# Patient Record
Sex: FEMALE | Race: WHITE | NOT HISPANIC OR LATINO | Employment: FULL TIME | URBAN - METROPOLITAN AREA
[De-identification: names, ages, dates, MRNs, and addresses within clinical notes are randomized per-mention and may not be internally consistent; named-entity substitution may affect disease eponyms.]

---

## 2023-11-25 ENCOUNTER — APPOINTMENT (OUTPATIENT)
Dept: RADIOLOGY | Facility: HOSPITAL | Age: 48
DRG: 281 | End: 2023-11-25
Payer: COMMERCIAL

## 2023-11-25 ENCOUNTER — HOSPITAL ENCOUNTER (INPATIENT)
Facility: HOSPITAL | Age: 48
LOS: 2 days | Discharge: HOME | DRG: 281 | End: 2023-11-27
Attending: STUDENT IN AN ORGANIZED HEALTH CARE EDUCATION/TRAINING PROGRAM | Admitting: INTERNAL MEDICINE
Payer: COMMERCIAL

## 2023-11-25 DIAGNOSIS — R07.9 CHEST PAIN, UNSPECIFIED TYPE: ICD-10-CM

## 2023-11-25 DIAGNOSIS — I21.4 NSTEMI (NON-ST ELEVATED MYOCARDIAL INFARCTION) (MULTI): Primary | ICD-10-CM

## 2023-11-25 DIAGNOSIS — I25.42 SPONTANEOUS DISSECTION OF CORONARY ARTERY: ICD-10-CM

## 2023-11-25 DIAGNOSIS — I21.3 STEMI (ST ELEVATION MYOCARDIAL INFARCTION) (MULTI): ICD-10-CM

## 2023-11-25 LAB
ALBUMIN SERPL BCP-MCNC: 4.4 G/DL (ref 3.4–5)
ALP SERPL-CCNC: 61 U/L (ref 33–110)
ALT SERPL W P-5'-P-CCNC: 12 U/L (ref 7–45)
ANION GAP SERPL CALC-SCNC: 11 MMOL/L (ref 10–20)
APTT PPP: 102 SECONDS (ref 27–38)
AST SERPL W P-5'-P-CCNC: 13 U/L (ref 9–39)
BASOPHILS # BLD AUTO: 0.03 X10*3/UL (ref 0–0.1)
BASOPHILS NFR BLD AUTO: 0.3 %
BILIRUB SERPL-MCNC: 0.4 MG/DL (ref 0–1.2)
BNP SERPL-MCNC: 22 PG/ML (ref 0–99)
BUN SERPL-MCNC: 9 MG/DL (ref 6–23)
CALCIUM SERPL-MCNC: 9 MG/DL (ref 8.6–10.3)
CARDIAC TROPONIN I PNL SERPL HS: 121 NG/L (ref 0–13)
CARDIAC TROPONIN I PNL SERPL HS: 352 NG/L (ref 0–13)
CARDIAC TROPONIN I PNL SERPL HS: 717 NG/L (ref 0–13)
CHLORIDE SERPL-SCNC: 105 MMOL/L (ref 98–107)
CO2 SERPL-SCNC: 25 MMOL/L (ref 21–32)
CREAT SERPL-MCNC: 0.84 MG/DL (ref 0.5–1.05)
EOSINOPHIL # BLD AUTO: 0.16 X10*3/UL (ref 0–0.7)
EOSINOPHIL NFR BLD AUTO: 1.7 %
ERYTHROCYTE [DISTWIDTH] IN BLOOD BY AUTOMATED COUNT: 12.5 % (ref 11.5–14.5)
GFR SERPL CREATININE-BSD FRML MDRD: 86 ML/MIN/1.73M*2
GLUCOSE SERPL-MCNC: 86 MG/DL (ref 74–99)
HCT VFR BLD AUTO: 38.5 % (ref 36–46)
HGB BLD-MCNC: 13.4 G/DL (ref 12–16)
IMM GRANULOCYTES # BLD AUTO: 0.05 X10*3/UL (ref 0–0.7)
IMM GRANULOCYTES NFR BLD AUTO: 0.5 % (ref 0–0.9)
INR PPP: 1.2 (ref 0.9–1.1)
LYMPHOCYTES # BLD AUTO: 1.43 X10*3/UL (ref 1.2–4.8)
LYMPHOCYTES NFR BLD AUTO: 15.6 %
MAGNESIUM SERPL-MCNC: 1.83 MG/DL (ref 1.6–2.4)
MCH RBC QN AUTO: 31.2 PG (ref 26–34)
MCHC RBC AUTO-ENTMCNC: 34.8 G/DL (ref 32–36)
MCV RBC AUTO: 90 FL (ref 80–100)
MONOCYTES # BLD AUTO: 0.58 X10*3/UL (ref 0.1–1)
MONOCYTES NFR BLD AUTO: 6.3 %
NEUTROPHILS # BLD AUTO: 6.9 X10*3/UL (ref 1.2–7.7)
NEUTROPHILS NFR BLD AUTO: 75.6 %
NRBC BLD-RTO: 0 /100 WBCS (ref 0–0)
PLATELET # BLD AUTO: 265 X10*3/UL (ref 150–450)
POTASSIUM SERPL-SCNC: 4 MMOL/L (ref 3.5–5.3)
PROT SERPL-MCNC: 7.2 G/DL (ref 6.4–8.2)
PROTHROMBIN TIME: 13.8 SECONDS (ref 9.8–12.8)
RBC # BLD AUTO: 4.3 X10*6/UL (ref 4–5.2)
SODIUM SERPL-SCNC: 137 MMOL/L (ref 136–145)
WBC # BLD AUTO: 9.2 X10*3/UL (ref 4.4–11.3)

## 2023-11-25 PROCEDURE — 99291 CRITICAL CARE FIRST HOUR: CPT | Mod: 25 | Performed by: STUDENT IN AN ORGANIZED HEALTH CARE EDUCATION/TRAINING PROGRAM

## 2023-11-25 PROCEDURE — 2550000001 HC RX 255 CONTRASTS: Performed by: STUDENT IN AN ORGANIZED HEALTH CARE EDUCATION/TRAINING PROGRAM

## 2023-11-25 PROCEDURE — 2720000007 HC OR 272 NO HCPCS: Performed by: STUDENT IN AN ORGANIZED HEALTH CARE EDUCATION/TRAINING PROGRAM

## 2023-11-25 PROCEDURE — B2111ZZ FLUOROSCOPY OF MULTIPLE CORONARY ARTERIES USING LOW OSMOLAR CONTRAST: ICD-10-PCS | Performed by: STUDENT IN AN ORGANIZED HEALTH CARE EDUCATION/TRAINING PROGRAM

## 2023-11-25 PROCEDURE — 99152 MOD SED SAME PHYS/QHP 5/>YRS: CPT | Performed by: STUDENT IN AN ORGANIZED HEALTH CARE EDUCATION/TRAINING PROGRAM

## 2023-11-25 PROCEDURE — 80053 COMPREHEN METABOLIC PANEL: CPT | Performed by: STUDENT IN AN ORGANIZED HEALTH CARE EDUCATION/TRAINING PROGRAM

## 2023-11-25 PROCEDURE — 84484 ASSAY OF TROPONIN QUANT: CPT | Performed by: STUDENT IN AN ORGANIZED HEALTH CARE EDUCATION/TRAINING PROGRAM

## 2023-11-25 PROCEDURE — 96361 HYDRATE IV INFUSION ADD-ON: CPT | Mod: 59

## 2023-11-25 PROCEDURE — 93458 L HRT ARTERY/VENTRICLE ANGIO: CPT | Performed by: STUDENT IN AN ORGANIZED HEALTH CARE EDUCATION/TRAINING PROGRAM

## 2023-11-25 PROCEDURE — 99153 MOD SED SAME PHYS/QHP EA: CPT | Performed by: STUDENT IN AN ORGANIZED HEALTH CARE EDUCATION/TRAINING PROGRAM

## 2023-11-25 PROCEDURE — 2020000001 HC ICU ROOM DAILY

## 2023-11-25 PROCEDURE — 2500000005 HC RX 250 GENERAL PHARMACY W/O HCPCS: Performed by: STUDENT IN AN ORGANIZED HEALTH CARE EDUCATION/TRAINING PROGRAM

## 2023-11-25 PROCEDURE — 36415 COLL VENOUS BLD VENIPUNCTURE: CPT | Performed by: STUDENT IN AN ORGANIZED HEALTH CARE EDUCATION/TRAINING PROGRAM

## 2023-11-25 PROCEDURE — 2500000001 HC RX 250 WO HCPCS SELF ADMINISTERED DRUGS (ALT 637 FOR MEDICARE OP): Performed by: STUDENT IN AN ORGANIZED HEALTH CARE EDUCATION/TRAINING PROGRAM

## 2023-11-25 PROCEDURE — 2500000004 HC RX 250 GENERAL PHARMACY W/ HCPCS (ALT 636 FOR OP/ED): Performed by: STUDENT IN AN ORGANIZED HEALTH CARE EDUCATION/TRAINING PROGRAM

## 2023-11-25 PROCEDURE — B2151ZZ FLUOROSCOPY OF LEFT HEART USING LOW OSMOLAR CONTRAST: ICD-10-PCS | Performed by: STUDENT IN AN ORGANIZED HEALTH CARE EDUCATION/TRAINING PROGRAM

## 2023-11-25 PROCEDURE — 71275 CT ANGIOGRAPHY CHEST: CPT | Performed by: RADIOLOGY

## 2023-11-25 PROCEDURE — 83880 ASSAY OF NATRIURETIC PEPTIDE: CPT | Performed by: STUDENT IN AN ORGANIZED HEALTH CARE EDUCATION/TRAINING PROGRAM

## 2023-11-25 PROCEDURE — 99222 1ST HOSP IP/OBS MODERATE 55: CPT | Performed by: INTERNAL MEDICINE

## 2023-11-25 PROCEDURE — 74174 CTA ABD&PLVS W/CONTRAST: CPT | Performed by: RADIOLOGY

## 2023-11-25 PROCEDURE — 85347 COAGULATION TIME ACTIVATED: CPT

## 2023-11-25 PROCEDURE — 4A023N7 MEASUREMENT OF CARDIAC SAMPLING AND PRESSURE, LEFT HEART, PERCUTANEOUS APPROACH: ICD-10-PCS | Performed by: STUDENT IN AN ORGANIZED HEALTH CARE EDUCATION/TRAINING PROGRAM

## 2023-11-25 PROCEDURE — 85025 COMPLETE CBC W/AUTO DIFF WBC: CPT | Performed by: STUDENT IN AN ORGANIZED HEALTH CARE EDUCATION/TRAINING PROGRAM

## 2023-11-25 PROCEDURE — 71045 X-RAY EXAM CHEST 1 VIEW: CPT | Mod: FY

## 2023-11-25 PROCEDURE — C1887 CATHETER, GUIDING: HCPCS | Performed by: STUDENT IN AN ORGANIZED HEALTH CARE EDUCATION/TRAINING PROGRAM

## 2023-11-25 PROCEDURE — 71275 CT ANGIOGRAPHY CHEST: CPT

## 2023-11-25 PROCEDURE — 71045 X-RAY EXAM CHEST 1 VIEW: CPT | Mod: FOREIGN READ | Performed by: RADIOLOGY

## 2023-11-25 PROCEDURE — 7100000009 HC PHASE TWO TIME - INITIAL BASE CHARGE: Performed by: STUDENT IN AN ORGANIZED HEALTH CARE EDUCATION/TRAINING PROGRAM

## 2023-11-25 PROCEDURE — 96375 TX/PRO/DX INJ NEW DRUG ADDON: CPT | Mod: 59

## 2023-11-25 PROCEDURE — 7100000010 HC PHASE TWO TIME - EACH INCREMENTAL 1 MINUTE: Performed by: STUDENT IN AN ORGANIZED HEALTH CARE EDUCATION/TRAINING PROGRAM

## 2023-11-25 PROCEDURE — 83735 ASSAY OF MAGNESIUM: CPT | Performed by: STUDENT IN AN ORGANIZED HEALTH CARE EDUCATION/TRAINING PROGRAM

## 2023-11-25 PROCEDURE — 85610 PROTHROMBIN TIME: CPT | Performed by: INTERNAL MEDICINE

## 2023-11-25 PROCEDURE — C1894 INTRO/SHEATH, NON-LASER: HCPCS | Performed by: STUDENT IN AN ORGANIZED HEALTH CARE EDUCATION/TRAINING PROGRAM

## 2023-11-25 PROCEDURE — 96374 THER/PROPH/DIAG INJ IV PUSH: CPT | Mod: 59

## 2023-11-25 PROCEDURE — 99222 1ST HOSP IP/OBS MODERATE 55: CPT | Performed by: STUDENT IN AN ORGANIZED HEALTH CARE EDUCATION/TRAINING PROGRAM

## 2023-11-25 RX ORDER — ATORVASTATIN CALCIUM 40 MG/1
40 TABLET, FILM COATED ORAL NIGHTLY
Status: DISCONTINUED | OUTPATIENT
Start: 2023-11-25 | End: 2023-11-27 | Stop reason: HOSPADM

## 2023-11-25 RX ORDER — ACETAMINOPHEN 160 MG/5ML
650 SOLUTION ORAL EVERY 4 HOURS PRN
Status: DISCONTINUED | OUTPATIENT
Start: 2023-11-25 | End: 2023-11-27 | Stop reason: HOSPADM

## 2023-11-25 RX ORDER — HEPARIN SODIUM 1000 [USP'U]/ML
INJECTION, SOLUTION INTRAVENOUS; SUBCUTANEOUS AS NEEDED
Status: DISCONTINUED | OUTPATIENT
Start: 2023-11-25 | End: 2023-11-25 | Stop reason: HOSPADM

## 2023-11-25 RX ORDER — HEPARIN SODIUM 5000 [USP'U]/ML
2000-4000 INJECTION, SOLUTION INTRAVENOUS; SUBCUTANEOUS EVERY 4 HOURS PRN
Status: DISCONTINUED | OUTPATIENT
Start: 2023-11-25 | End: 2023-11-25

## 2023-11-25 RX ORDER — POLYETHYLENE GLYCOL 3350 17 G/17G
17 POWDER, FOR SOLUTION ORAL DAILY
Status: DISCONTINUED | OUTPATIENT
Start: 2023-11-25 | End: 2023-11-27 | Stop reason: HOSPADM

## 2023-11-25 RX ORDER — SODIUM CHLORIDE 9 MG/ML
1 INJECTION, SOLUTION INTRAVENOUS CONTINUOUS
Status: ACTIVE | OUTPATIENT
Start: 2023-11-25 | End: 2023-11-26

## 2023-11-25 RX ORDER — MIDAZOLAM HYDROCHLORIDE 1 MG/ML
INJECTION INTRAMUSCULAR; INTRAVENOUS AS NEEDED
Status: DISCONTINUED | OUTPATIENT
Start: 2023-11-25 | End: 2023-11-25 | Stop reason: HOSPADM

## 2023-11-25 RX ORDER — MORPHINE SULFATE 4 MG/ML
4 INJECTION, SOLUTION INTRAMUSCULAR; INTRAVENOUS ONCE
Status: COMPLETED | OUTPATIENT
Start: 2023-11-25 | End: 2023-11-25

## 2023-11-25 RX ORDER — HEPARIN SODIUM 5000 [USP'U]/ML
4000 INJECTION, SOLUTION INTRAVENOUS; SUBCUTANEOUS ONCE
Status: COMPLETED | OUTPATIENT
Start: 2023-11-25 | End: 2023-11-25

## 2023-11-25 RX ORDER — NAPROXEN SODIUM 220 MG/1
324 TABLET, FILM COATED ORAL ONCE
Status: COMPLETED | OUTPATIENT
Start: 2023-11-25 | End: 2023-11-25

## 2023-11-25 RX ORDER — FENTANYL CITRATE 50 UG/ML
INJECTION, SOLUTION INTRAMUSCULAR; INTRAVENOUS AS NEEDED
Status: DISCONTINUED | OUTPATIENT
Start: 2023-11-25 | End: 2023-11-25 | Stop reason: HOSPADM

## 2023-11-25 RX ORDER — LIDOCAINE HYDROCHLORIDE 20 MG/ML
INJECTION, SOLUTION INFILTRATION; PERINEURAL AS NEEDED
Status: DISCONTINUED | OUTPATIENT
Start: 2023-11-25 | End: 2023-11-25 | Stop reason: HOSPADM

## 2023-11-25 RX ORDER — SODIUM CHLORIDE 9 MG/ML
100 INJECTION, SOLUTION INTRAVENOUS CONTINUOUS
Status: DISCONTINUED | OUTPATIENT
Start: 2023-11-25 | End: 2023-11-25

## 2023-11-25 RX ORDER — NAPROXEN SODIUM 220 MG/1
81 TABLET, FILM COATED ORAL DAILY
Status: DISCONTINUED | OUTPATIENT
Start: 2023-11-26 | End: 2023-11-27 | Stop reason: HOSPADM

## 2023-11-25 RX ORDER — ACETAMINOPHEN 325 MG/1
650 TABLET ORAL EVERY 4 HOURS PRN
Status: DISCONTINUED | OUTPATIENT
Start: 2023-11-25 | End: 2023-11-27 | Stop reason: HOSPADM

## 2023-11-25 RX ORDER — ONDANSETRON HYDROCHLORIDE 2 MG/ML
4 INJECTION, SOLUTION INTRAVENOUS ONCE
Status: COMPLETED | OUTPATIENT
Start: 2023-11-25 | End: 2023-11-25

## 2023-11-25 RX ORDER — ACETAMINOPHEN 650 MG/1
650 SUPPOSITORY RECTAL EVERY 4 HOURS PRN
Status: DISCONTINUED | OUTPATIENT
Start: 2023-11-25 | End: 2023-11-27 | Stop reason: HOSPADM

## 2023-11-25 RX ORDER — HEPARIN SODIUM 10000 [USP'U]/100ML
0-4000 INJECTION, SOLUTION INTRAVENOUS CONTINUOUS
Status: DISCONTINUED | OUTPATIENT
Start: 2023-11-25 | End: 2023-11-25

## 2023-11-25 RX ADMIN — HEPARIN SODIUM 4000 UNITS: 5000 INJECTION INTRAVENOUS; SUBCUTANEOUS at 13:56

## 2023-11-25 RX ADMIN — SODIUM CHLORIDE 500 ML: 9 INJECTION, SOLUTION INTRAVENOUS at 14:19

## 2023-11-25 RX ADMIN — TICAGRELOR 90 MG: 90 TABLET ORAL at 20:11

## 2023-11-25 RX ADMIN — TICAGRELOR 180 MG: 90 TABLET ORAL at 13:56

## 2023-11-25 RX ADMIN — SODIUM CHLORIDE, POTASSIUM CHLORIDE, SODIUM LACTATE AND CALCIUM CHLORIDE 1000 ML: 600; 310; 30; 20 INJECTION, SOLUTION INTRAVENOUS at 09:51

## 2023-11-25 RX ADMIN — MORPHINE SULFATE 4 MG: 4 INJECTION, SOLUTION INTRAMUSCULAR; INTRAVENOUS at 09:50

## 2023-11-25 RX ADMIN — ATORVASTATIN CALCIUM 40 MG: 40 TABLET, FILM COATED ORAL at 20:12

## 2023-11-25 RX ADMIN — IOHEXOL 100 ML: 350 INJECTION, SOLUTION INTRAVENOUS at 12:06

## 2023-11-25 RX ADMIN — ONDANSETRON 4 MG: 2 INJECTION INTRAMUSCULAR; INTRAVENOUS at 09:50

## 2023-11-25 RX ADMIN — HEPARIN SODIUM 900 UNITS/HR: 10000 INJECTION, SOLUTION INTRAVENOUS at 13:58

## 2023-11-25 RX ADMIN — ASPIRIN 81 MG CHEWABLE TABLET 324 MG: 81 TABLET CHEWABLE at 11:26

## 2023-11-25 SDOH — SOCIAL STABILITY: SOCIAL INSECURITY: WERE YOU ABLE TO COMPLETE ALL THE BEHAVIORAL HEALTH SCREENINGS?: YES

## 2023-11-25 SDOH — SOCIAL STABILITY: SOCIAL INSECURITY: ABUSE: ADULT

## 2023-11-25 SDOH — SOCIAL STABILITY: SOCIAL INSECURITY: HAVE YOU HAD THOUGHTS OF HARMING ANYONE ELSE?: NO

## 2023-11-25 ASSESSMENT — ACTIVITIES OF DAILY LIVING (ADL)
LACK_OF_TRANSPORTATION: NO
JUDGMENT_ADEQUATE_SAFELY_COMPLETE_DAILY_ACTIVITIES: YES
HEARING - RIGHT EAR: FUNCTIONAL
ADEQUATE_TO_COMPLETE_ADL: YES
BATHING: INDEPENDENT
HEARING - LEFT EAR: FUNCTIONAL
FEEDING YOURSELF: INDEPENDENT
DRESSING YOURSELF: INDEPENDENT
TOILETING: INDEPENDENT
PATIENT'S MEMORY ADEQUATE TO SAFELY COMPLETE DAILY ACTIVITIES?: YES
GROOMING: INDEPENDENT
WALKS IN HOME: INDEPENDENT

## 2023-11-25 ASSESSMENT — PAIN SCALES - GENERAL
PAINLEVEL_OUTOF10: 3
PAINLEVEL_OUTOF10: 7
PAINLEVEL_OUTOF10: 5 - MODERATE PAIN
PAINLEVEL_OUTOF10: 3
PAINLEVEL_OUTOF10: 0 - NO PAIN
PAINLEVEL_OUTOF10: 0 - NO PAIN
PAINLEVEL_OUTOF10: 8
PAINLEVEL_OUTOF10: 1
PAINLEVEL_OUTOF10: 1

## 2023-11-25 ASSESSMENT — LIFESTYLE VARIABLES
AUDIT-C TOTAL SCORE: 0
HOW OFTEN DO YOU HAVE 6 OR MORE DRINKS ON ONE OCCASION: NEVER
SKIP TO QUESTIONS 9-10: 1
HOW MANY STANDARD DRINKS CONTAINING ALCOHOL DO YOU HAVE ON A TYPICAL DAY: PATIENT DOES NOT DRINK
HOW OFTEN DO YOU HAVE A DRINK CONTAINING ALCOHOL: NEVER
EVER HAD A DRINK FIRST THING IN THE MORNING TO STEADY YOUR NERVES TO GET RID OF A HANGOVER: NO
AUDIT-C TOTAL SCORE: 0
PRESCIPTION_ABUSE_PAST_12_MONTHS: NO
HAVE PEOPLE ANNOYED YOU BY CRITICIZING YOUR DRINKING: NO
SUBSTANCE_ABUSE_PAST_12_MONTHS: NO
REASON UNABLE TO ASSESS: NO
HAVE YOU EVER FELT YOU SHOULD CUT DOWN ON YOUR DRINKING: NO
EVER FELT BAD OR GUILTY ABOUT YOUR DRINKING: NO

## 2023-11-25 ASSESSMENT — COGNITIVE AND FUNCTIONAL STATUS - GENERAL
MOBILITY SCORE: 24
DAILY ACTIVITIY SCORE: 24
DAILY ACTIVITIY SCORE: 24
PATIENT BASELINE BEDBOUND: NO
MOBILITY SCORE: 24
DAILY ACTIVITIY SCORE: 24
MOBILITY SCORE: 24

## 2023-11-25 ASSESSMENT — PATIENT HEALTH QUESTIONNAIRE - PHQ9
1. LITTLE INTEREST OR PLEASURE IN DOING THINGS: NOT AT ALL
SUM OF ALL RESPONSES TO PHQ9 QUESTIONS 1 & 2: 0
2. FEELING DOWN, DEPRESSED OR HOPELESS: NOT AT ALL

## 2023-11-25 ASSESSMENT — PAIN - FUNCTIONAL ASSESSMENT
PAIN_FUNCTIONAL_ASSESSMENT: 0-10

## 2023-11-25 ASSESSMENT — COLUMBIA-SUICIDE SEVERITY RATING SCALE - C-SSRS
2. HAVE YOU ACTUALLY HAD ANY THOUGHTS OF KILLING YOURSELF?: NO
6. HAVE YOU EVER DONE ANYTHING, STARTED TO DO ANYTHING, OR PREPARED TO DO ANYTHING TO END YOUR LIFE?: NO
1. IN THE PAST MONTH, HAVE YOU WISHED YOU WERE DEAD OR WISHED YOU COULD GO TO SLEEP AND NOT WAKE UP?: NO

## 2023-11-25 ASSESSMENT — PAIN DESCRIPTION - LOCATION
LOCATION: CHEST

## 2023-11-25 ASSESSMENT — PAIN DESCRIPTION - PAIN TYPE: TYPE: ACUTE PAIN

## 2023-11-25 NOTE — BRIEF OP NOTE
Physician Transition of Care Summary  Invasive Cardiovascular Lab    Procedure Date: 11/25/2023  Attending:    Tylor Hermosillo - Primary  Resident/Fellow/Other Assistant: Surgeon(s) and Role:    Indications:   Pre-op Diagnosis     * NSTEMI (non-ST elevated myocardial infarction) (CMS/HCC) [I21.4]    Post-procedure diagnosis:   Post-op Diagnosis     * NSTEMI (non-ST elevated myocardial infarction) (CMS/HCC) [I21.4]    Procedure(s):     * Left Heart Cath, With LV      Procedure Findings:   -Nonobstructive CAD  -LV gram shows hyperdynamic base with akinetic/dyskinetic apex suggestive of Takotsubo cardiomyopathy  -There is diffuse CAD in distal LAD which can also be seen in patient with SCAD  -LVEDP of 18 mmHg    Description of the Procedure:   As above    Complications:   None    Stents/Implants:     None      Estimated Blood Loss:   5 mL    Anesthesia: Moderate Sedation Anesthesia Staff: No anesthesia staff entered.    Any Specimen(s) Removed:   No specimens collected during this procedure.    Disposition:   ICU  Will continue DAPT with ASA and Ticagrelor  Okay to stop heparin gtt       Electronically signed by: Gene Hermosillo MD, 11/25/2023 4:19 PM

## 2023-11-25 NOTE — CONSULTS
Inpatient consult to Cardiology  Consult performed by: Gene Hermosillo MD  Consult ordered by: Jeremi OLIVAS MD  Reason for consult: NSTEMI  Assessment/Recommendations: See full note         History Of Present Illness:    Mandie Arevalo is a 48 y.o. female with no significant past medical history is coming to the hospital due to chest pain.  Patient reports acute onset of chest pain earlier this morning which was 10 out of 10 burning sensation in the chest in the center of chest.  This was associate with nausea vomiting and diaphoresis.  Patient endorses numbness and tingling in the arm with the chest pain and radiation to the shoulders.  Patient complained of ongoing chest pain which brought her to the hospital.  In the ED patient got CTA of chest which did not show any dissection or pulmonary embolism.  Her initial troponins were elevated and uptrending.  She was started on management for ACS with aspirin Brilinta and heparin.  She received IV morphine in the ED with some improvement in the chest discomfort.  At the time of my evaluation patient continued to had some chest discomfort which was improved from onset but he still continued to have pain.    EKG was reviewed which shows normal sinus rhythm, possible septal MI, poor R wave progression.  Patient was taken to Cath Lab for evaluation.         Last Recorded Vitals:  Vitals:    11/25/23 1230 11/25/23 1330 11/25/23 1513 11/25/23 1638   BP: 107/76 130/82 141/82    BP Location:       Patient Position:       Pulse:  87 92    Resp:  18 15    Temp:    36.4 °C (97.5 °F)   TempSrc:    Temporal   SpO2: 100% 100% 99%    Weight:       Height:           Last Labs:  CBC - 11/25/2023:  9:47 AM  9.2 13.4 265    38.5      CMP - 11/25/2023:  9:47 AM  9.0 7.2 13 --- 0.4   _ 4.4 12 61      PTT - No results in last year.  _   _ _     Troponin I, High Sensitivity   Date/Time Value Ref Range Status   11/25/2023 12:38  () 0 - 13 ng/L Final     Comment:     Previous  "result verified on 11/25/2023 1022 on specimen/case [id number] called with component TRP for procedure Troponin I, High Sensitivity, Initial with value 121 ng/L.   11/25/2023 10:44  (HH) 0 - 13 ng/L Final     Comment:     Previous result verified on 11/25/2023 1022 on specimen/case [id number] called with component TRP for procedure Troponin I, High Sensitivity, Initial with value 121 ng/L.   11/25/2023 09:47  (HH) 0 - 13 ng/L Final     BNP   Date/Time Value Ref Range Status   11/25/2023 09:47 AM 22 0 - 99 pg/mL Final      Last I/O:  No intake/output data recorded.    Past Cardiology Tests (Last 3 Years):  EKG:  No results found for this or any previous visit from the past 1095 days.    Echo:  No results found for this or any previous visit from the past 1095 days.    Ejection Fractions:  No results found for: \"EF\"  Cath:  No results found for this or any previous visit from the past 1095 days.    Stress Test:  No results found for this or any previous visit from the past 1095 days.    Cardiac Imaging:  No results found for this or any previous visit from the past 1095 days.      Past Medical History:  She has no past medical history on file.    Past Surgical History:  She has no past surgical history on file.      Social History:  She reports that she has never smoked. She has never used smokeless tobacco. She reports current alcohol use. She reports that she does not use drugs.    Family History:  No family history on file.     Allergies:  Patient has no known allergies.    Inpatient Medications:  Scheduled medications   Medication Dose Route Frequency    [START ON 11/26/2023] aspirin  81 mg oral Daily    atorvastatin  40 mg oral Nightly    polyethylene glycol  17 g oral Daily    ticagrelor  90 mg oral BID     PRN medications   Medication    acetaminophen    Or    acetaminophen    Or    acetaminophen    oxygen     Continuous Medications   Medication Dose Last Rate    sodium chloride 0.9%  " 1 mL/kg/hr       Outpatient Medications:  No current outpatient medications    Physical Exam:  General: Alert and Oriented, No distress, cooperative  Head: Normocephalic without obvious abnormality, atraumatic  Eyes: Conjunctiva/corneas clear, EOM's grossly intact  Neck: Supple, trachea midline, No thyroid enlargement/tenderness/nodules; No JVD  Lungs: Clear to auscultation bilaterally, no wheezes, rhonci, or rales. respirations unlabored  Chest Wall: No tenderness or deformity  Heart: Regular rhythm, normal S1/S2, no murmur  Abdomen: Soft, non-tender, Non-distended, bowel sounds active  Extremities: No edema, no cyanosis, no clubbing  Skin: Skin color, texture, turgor normal.  No rashes or lesions noted  Neurologic: Alert and oriented x 3, grossly moving all extremities, speech intact       Assessment/Plan   NSTEMI  Stress induced Cardiomyopathy    -Patient tolerated procedure well without complications.  Left heart cath showed nonobstructive CAD.  LV gram shows hyperdynamic base with akinetic/dyskinetic apex which may be suggestive of stress-induced/Takotsubo cardiomyopathy.  Distal LAD had diffuse disease which can be seen in patients with scad.  -Will recommend to continue trending troponin until peaked.  -Loaded with Brilinta and aspirin on arrival.  I will recommend to continue DAPT with aspirin and Brilinta.    -Start high intensity statin. Please check fasting lipid panel and HbA1c  -Okay to stop heparin gtt.  -Will start on beta-blocker in a.m.  -IV fluids per protocol for KELI prevention  -Obtain echocardiogram in a.m.  -Cardiac rehab referral    Urology will follow    Peripheral IV 11/25/23 20 G Proximal;Right;Anterior Forearm (Active)   Site Assessment Clean;Dry;Intact 11/25/23 0950   Dressing Type Transparent 11/25/23 0950   Line Status Blood return noted 11/25/23 0950   Number of days: 0       Code Status:  Full Code            Gene Hermosillo MD

## 2023-11-25 NOTE — H&P
History Of Present Illness  Mandie Arevalo is a 48 y.o. female with no significant past medical history came to the emergency room complaining of mid sternal chest pain about 10 on 10 intensity sudden in onset this morning, radiating to both arms, also was feeling numbness of upper extremities, did not try to take any medications at home, as the symptoms continue to persist came to the emergency room for further evaluation, workup in the emergency room showed elevated troponins, CTA chest unremarkable ruled out pulmonary embolism, diagnosed with NSTEMI patient was started on heparin by weight, as symptoms were persistent cardiology evaluated the patient and emergently took patient to Cath Lab for left heart catheterization, left heart catheterization report showed normal coronaries, LV gram showed akinetic/dyskinetic apex suggestive of Takotsubo cardiomyopathy.  Patient to transfer to ICU for further management.  Currently patient is in ICU, denies any chest pain or pressure, denies any shortness of breath, denies any abdominal pain nausea or vomitings.       Past Medical History  She has no past medical history on file.    Surgical History  She has no past surgical history on file.     Social History  She reports that she has never smoked. She has never used smokeless tobacco. She reports current alcohol use. She reports that she does not use drugs.    Family History  No family history on file.     Allergies  Patient has no known allergies.    Review of Systems  All 10 point review of systems reviewed, pertinent positives mentioned in the H&P.    Physical Exam  General: No distress  Neck: Supple.  HEENT: Normocephalic atraumatic pupils equal and clear  Heart: S1-S2 heard no murmurs gallops regurgitation  Lungs: Clear to auscultation bilaterally no wheezing rales rhonchi.  Abdomen: Nondistended nontender bowel sounds are present.  Neuro: No focal deficits.    Last Recorded Vitals  Blood pressure 141/82, pulse  "92, temperature 36.4 °C (97.5 °F), temperature source Temporal, resp. rate 15, height 1.6 m (5' 3\"), weight 72.6 kg (160 lb), SpO2 99 %.    Relevant Results    Scheduled medications  [START ON 11/26/2023] aspirin, 81 mg, oral, Daily  atorvastatin, 40 mg, oral, Nightly  polyethylene glycol, 17 g, oral, Daily  ticagrelor, 90 mg, oral, BID      Continuous medications  sodium chloride 0.9%, 1 mL/kg/hr      PRN medications  PRN medications: acetaminophen **OR** acetaminophen **OR** acetaminophen, oxygen  Results for orders placed or performed during the hospital encounter of 11/25/23 (from the past 24 hour(s))   CBC and Auto Differential   Result Value Ref Range    WBC 9.2 4.4 - 11.3 x10*3/uL    nRBC 0.0 0.0 - 0.0 /100 WBCs    RBC 4.30 4.00 - 5.20 x10*6/uL    Hemoglobin 13.4 12.0 - 16.0 g/dL    Hematocrit 38.5 36.0 - 46.0 %    MCV 90 80 - 100 fL    MCH 31.2 26.0 - 34.0 pg    MCHC 34.8 32.0 - 36.0 g/dL    RDW 12.5 11.5 - 14.5 %    Platelets 265 150 - 450 x10*3/uL    Neutrophils % 75.6 40.0 - 80.0 %    Immature Granulocytes %, Automated 0.5 0.0 - 0.9 %    Lymphocytes % 15.6 13.0 - 44.0 %    Monocytes % 6.3 2.0 - 10.0 %    Eosinophils % 1.7 0.0 - 6.0 %    Basophils % 0.3 0.0 - 2.0 %    Neutrophils Absolute 6.90 1.20 - 7.70 x10*3/uL    Immature Granulocytes Absolute, Automated 0.05 0.00 - 0.70 x10*3/uL    Lymphocytes Absolute 1.43 1.20 - 4.80 x10*3/uL    Monocytes Absolute 0.58 0.10 - 1.00 x10*3/uL    Eosinophils Absolute 0.16 0.00 - 0.70 x10*3/uL    Basophils Absolute 0.03 0.00 - 0.10 x10*3/uL   Comprehensive Metabolic Panel   Result Value Ref Range    Glucose 86 74 - 99 mg/dL    Sodium 137 136 - 145 mmol/L    Potassium 4.0 3.5 - 5.3 mmol/L    Chloride 105 98 - 107 mmol/L    Bicarbonate 25 21 - 32 mmol/L    Anion Gap 11 10 - 20 mmol/L    Urea Nitrogen 9 6 - 23 mg/dL    Creatinine 0.84 0.50 - 1.05 mg/dL    eGFR 86 >60 mL/min/1.73m*2    Calcium 9.0 8.6 - 10.3 mg/dL    Albumin 4.4 3.4 - 5.0 g/dL    Alkaline Phosphatase 61 33 - " 110 U/L    Total Protein 7.2 6.4 - 8.2 g/dL    AST 13 9 - 39 U/L    Bilirubin, Total 0.4 0.0 - 1.2 mg/dL    ALT 12 7 - 45 U/L   Magnesium   Result Value Ref Range    Magnesium 1.83 1.60 - 2.40 mg/dL   B-type natriuretic peptide   Result Value Ref Range    BNP 22 0 - 99 pg/mL   Troponin I, High Sensitivity, Initial   Result Value Ref Range    Troponin I, High Sensitivity 121 (HH) 0 - 13 ng/L   Troponin, High Sensitivity, 1 Hour   Result Value Ref Range    Troponin I, High Sensitivity 352 (HH) 0 - 13 ng/L   Troponin I, High Sensitivity   Result Value Ref Range    Troponin I, High Sensitivity 717 (HH) 0 - 13 ng/L     CT angio chest abdomen pelvis    Result Date: 11/25/2023  Interpreted By:  Javier Low, STUDY: CT ANGIO CHEST ABDOMEN PELVIS;  11/25/2023 12:17 pm   INDICATION: Signs/Symptoms:chest pain, arm numbness.   COMPARISON: Portable chest earlier same day 25 November 2023 at 1008 hours   ACCESSION NUMBER(S): [id number]   ORDERING CLINICIAN: WES MORROW   TECHNIQUE: CTA of the chest, abdomen and pelvis included CT chest, abdomen pelvis from the thoracic inlet through the symphysis pubis before and in the arterial phase after the uneventful administration of intravenous contrast (100 mL Omnipaque 350)   Three dimensional maximum intensity projection (3-D MIPs) image/s were created on a separate dedicated workstation, reviewed and saved   FINDINGS: AORTA:   Acute intramural hematoma: Negative Penetrating ulcer: Negative Dissection: Negative Thoracic aortic aneurysm: Negative Abdominal aortic aneurysm: Negative   MAJOR AORTIC BRANCH VESSELS:   Brachiocephalic arteries: No significant focal stenosis Celiac and branches: No significant focal stenosis SMA and first order branches: No significant focal stenosis Renal arteries: No significant focal stenosis LEONOR: No significant focal stenosis Iliac arteries: No significant focal stenosis Other: n/a   OTHER CARDIOVASCULAR: Heart size: Within normal limits   Acute  pulmonary embolism: Negative through the lobar (second order) branch level. Segmental and more distal branches not well enough opacified to evaluate. Pulmonary arteries ectasia: Negative   Heart failure change: Negative.  No sign of interstitial or alveolar edema.   Other: n/a   OTHER CHEST:   NONVASCULAR MEDIASTINUM: Esophagus: Grossly normal by CT Mediastinal Mass: Negative Hiatal hernia: None   LUNGS / AIRSPACES / AIRWAYS:   LARGE AIRWAYS Filling defect: Negative Wall thickening: Negative Bronchiectasis: Negative Other: N/A   AIRSPACES Fibrosis: Negative Emphysema: Negative Consolidation: Negative Ground glass airspace disease: Negative Edema: Negative Nodule / Mass: Negative Other: Minimal dependent atelectasis   PLEURA: No pleural effusion or pneumothorax on either side   LYMPH NODES: No thoracic adenopathy   THORACIC SKELETON: No acute findings   CHEST WALL: I suspect small cysts in both breasts. Most recent dedicated breast imaging are the screening mammograms from August, 2021 at which time at least some of these cysts were present but unchanged from their respective priors. More recent breast imaging should be considered unless this patient has up-to-date breast imaging at some outside institution   -------   CT ABDOMEN AND PELVIS:   LIVER: Normal. No enlargement or evidence of cirrhosis or fatty change. No mass or other suspect lesion.   SPLEEN: Normal. No enlargement, mass or evidence of splenic vein thrombosis.   PANCREAS: Normal. No CT evidence of acute or chronic pancreatitis. No duct dilation. No mass.   GALLBLADDER: Normal CT appearance. No dilation, calcified, or gas-containing stones.  Other types of gallstones could be occult on CT and detectable only by ultrasound.   BILE DUCTS: Normal. No biliary duct dilation.   ADRENAL GLANDS: Small right lipid rich adenoma does not need follow-up. Left side normal. No acute findings or findings needing follow-up on either side   KIDNEYS AND URETERS: Normal.   No hydronephrosis on either side.  No mass.  Symmetric enhancement.  No infarct or CT evidence of acute pyelonephritis.  No substantial radiodense stone.  Tiny stones and radiolucent stones could be occult on CT.   LYMPH NODES: No adenopathy, intraperitoneal, retroperitoneal, pelvic or otherwise   APPENDIX: Normal.  Not dilated, thick walled or in any other way inflamed in appearance.  No inflammatory change about the appendix.   COLON: Normal. No sign of acute diverticulitis or other colitis. No annular constricting mass.   SMALL BOWEL: Normal. No small bowel dilation or any other sign of small bowel obstruction. No sign of active inflammatory bowel disease.   STOMACH / DUODENUM: Grossly normal by CT which has limited sensitivity and specificity for the stomach and duodenum.   RETROPERITONEUM: Normal.  No acute hemorrhage or inflammatory change. Lymph nodes in a separate dedicated section.   OMENTUM, MESENTERY AND PERITONEAL SPACES: Free intraperitoneal air: Negative Free intraperitoneal fluid: Negative Abscess: Negative Other: n/a   URINARY BLADDER: Normal. No wall thickening, large diverticula, radiodense stone or surrounding inflammatory change.   PELVIS: The uterus is present. Probability of a lower uterine body or even cervical fibroid anteriorly. One substantial sized cyst in each adnexa, each about 3 cm diameter. On the left, there is a delineated attenuation difference with more simple appearing fluid layering on top of what is probably hemorrhagic fluid   ABDOMINAL WALL: Hernia: Negative Other: No acute or contributory abnormality.   ABDOMINAL / PELVIC SKELETON: Grade 1 anterolisthesis of L5 on S1 with associated chronic bilateral L5 pars defects. No acute findings       NO ACUTE AORTIC HEMATOMA, PENETRATING ULCER, ANEURYSM, AORTIC DISSECTION OR ANY OTHER ACUTE FINDINGS IN THE CHEST, ABDOMEN OR PELVIS   CTA OF THE BRACHIOCEPHALIC VESSELS LIKEWISE UNREMARKABLE, WITHOUT DISSECTION OR HIGH-GRADE STENOSIS   NO  ACUTE PULMONARY EMBOLISM THROUGH THE SEGMENTAL BRANCH LEVEL (THIS EXAM HAS ESSENTIALLY EQUIVALENT SENSITIVITY/SPECIFICITY FOR ACUTE PULMONARY EMBOLISM EVALUATION AS A DEDICATED CT CHEST FOR PULMONARY EMBOLISM EVALUATION AND IS NEGATIVE)   NO ACUTE AIRSPACE DISEASE SUCH AS PNEUMONIA   NO PLEURAL OR PERICARDIAL EFFUSION   NO PNEUMOTHORAX   NO ACUTE FINDINGS IN THE ABDOMEN OR PELVIS; HOWEVER, NONEMERGENT BUT NEAR TERM PELVIC ULTRASOUND RECOMMENDED TO EVALUATE THE ADNEXA. ONE PROMINENT CYST ON EACH SIDE, WITH PROBABILITY OF LAYERING HEMORRHAGIC DEBRIS IN THE LEFT OVARIAN/ADNEXAL CYST   MACRO: None   Signed by: Javier Low 11/25/2023 12:49 PM Dictation workstation:   [id number]    XR chest 1 view    Result Date: 11/25/2023  STUDY: Chest Radiograph;  11/25/2023 10:14 AM. INDICATION: Chest pain. COMPARISON: None Available. ACCESSION NUMBER(S): [id number] ORDERING CLINICIAN: WES MORROW TECHNIQUE:  Frontal chest was obtained at 10:12 hours. FINDINGS: CARDIOMEDIASTINAL SILHOUETTE: Cardiomediastinal silhouette is normal in size and configuration.  LUNGS: Lungs are clear.  ABDOMEN: No remarkable upper abdominal findings.  BONES: No acute osseous changes.    No acute cardiopulmonary abnormality. Signed by Elliot Marshall MD       Assessment/Plan   NSTEMI.  Takotsubo cardiomyopathy  Elevated troponins  Obesity.    Plan:  Initially patient was treated with heparin by weight.  As patient continues to complain of her chest discomfort in the setting of worsening troponins.  Cardiology performed a left heart catheterization with LV.  Cath results-nonobstructive coronary artery disease.  LV gram showed a hyperdynamic base with akinetic/dyskinetic apex suggestive of Takotsubo cardiomyopathy  Discussed with the cardiology, continuing symptomatic treatment.  Post left heart catheterization orders placed.  Follow-up CBC BMP ordered.  PT OT evaluation.    DVT prophylaxis:  Heparin subcu.    Disposition:  Possible discharge home in  next 1 to 2 days.    I spent 35 minutes in the professional and overall care of this patient.      Jeremi Palmer MD

## 2023-11-25 NOTE — ED PROVIDER NOTES
HPI   Chief Complaint   Patient presents with    C     Bilateral arm numbness, nausea, vomiting, chest tightness        HPI     48-year-old female present to the emergency department with symptoms of chest discomfort that began this morning.  She was making lunch for her son to take to work when she began having chest tightness described as midsternal with radiation to bilateral arms.  She then felt very warm and went outside and had 2 episodes of emesis, nonbloody nonbilious.  She then had some numbness in the distal fingertips ultimately prompting her to come in by private vehicle for evaluation here in the emergency department.  She does have an established primary care provider last seen back in September, no history of hypertension, diabetes, hyperlipidemia, no smoking.  She is otherwise been healthy.  No recent changes or other issues.  Denies any headache, vision changes or strength deficits.  Has any recent trauma, fevers or chills or cough.  Denies any recent travel trips or prolonged immobility or surgeries or lower extremity swelling or pain.               No data recorded                Patient History   No past medical history on file.  No past surgical history on file.  No family history on file.  Social History     Tobacco Use    Smoking status: Not on file    Smokeless tobacco: Not on file   Substance Use Topics    Alcohol use: Not on file    Drug use: Not on file       Physical Exam   ED Triage Vitals [11/25/23 0908]   Temp Heart Rate Resp BP   37.1 °C (98.8 °F) 79 16 126/85      SpO2 Temp src Heart Rate Source Patient Position   100 % -- -- --      BP Location FiO2 (%)     -- --       Physical Exam  Vitals and nursing note reviewed.   Constitutional:       General: She is not in acute distress.     Appearance: She is well-developed.   HENT:      Head: Normocephalic and atraumatic.   Eyes:      Conjunctiva/sclera: Conjunctivae normal.   Cardiovascular:      Rate and Rhythm: Normal rate and regular  rhythm.      Heart sounds: No murmur heard.  Pulmonary:      Effort: Pulmonary effort is normal. No respiratory distress.      Breath sounds: Normal breath sounds.   Abdominal:      Palpations: Abdomen is soft.      Tenderness: There is no abdominal tenderness.   Musculoskeletal:         General: No swelling.      Cervical back: Neck supple.   Skin:     General: Skin is warm and dry.      Capillary Refill: Capillary refill takes less than 2 seconds.   Neurological:      General: No focal deficit present.      Mental Status: She is alert and oriented to person, place, and time.      Cranial Nerves: No cranial nerve deficit.      Sensory: No sensory deficit.      Motor: No weakness.      Coordination: Coordination normal.      Gait: Gait normal.   Psychiatric:         Mood and Affect: Mood normal.         ED Course & MDM   ED Course as of 11/27/23 1532   Sat Nov 25, 2023   1003 CBC and Auto Differential  No leukocytosis, Hgb appropriate at 12.4 [AH]   1003 EKG with a rate of 74, , QRS 85, QTc 435.  No STEMI or ischemic changes noted.  Impression normal sinus rhythm. []      ED Course User Index  [AH] Corinne Boo MD         Diagnoses as of 11/27/23 1532   NSTEMI (non-ST elevated myocardial infarction) (CMS/[facility])   Chest pain, unspecified type       Medical Decision Making  Patient is a pleasant 48-year-old female presenting as above.  Arrival hemodynamically stable afebrile, nontachycardic and normotensive she is saturating 100% on room air.  She presents with chest discomfort, on bedside assessment she did endorse some numbness as well, she does endorse sensation intact to light touch throughout on the bilateral upper and lower extremities.  5-5 strength in bilateral upper and lower extremities no ataxia on finger-nose and cranial nerves II through XII intact.  Overall neurologic exam at this time is reassuring, patient without headache.  Given presenting chest pain as well we will get a cardiac workup, low  risk factors, PERC negative.  Given morphine, Zofran and a liter of IV fluids for symptoms pending workup and reassessment.    Patient here remains hemodynamically stable, reviewed workup here notable for an elevated troponin at 121.  She is given 324 of aspirin a CT angio was performed for evaluation given numbness, negative for any dissection or pulmonary embolism.  Delta troponin is elevated at 352.  Reviewed with Dr. Hermosillo, given Brilinta and heparin drip started.  Given third troponin continues to rise will be taken for catheterization.  Admitted to the ICU.    Procedure  Critical Care    Performed by: Corinne Boo MD  Authorized by: Corinne Boo MD    Critical care provider statement:     Critical care time (minutes):  31    Critical care time was exclusive of:  Separately billable procedures and treating other patients and teaching time    Critical care was necessary to treat or prevent imminent or life-threatening deterioration of the following conditions:  Cardiac failure    Critical care was time spent personally by me on the following activities:  Ordering and performing treatments and interventions, ordering and review of laboratory studies, ordering and review of radiographic studies, re-evaluation of patient's condition, examination of patient, evaluation of patient's response to treatment, discussions with consultants, development of treatment plan with patient or surrogate and obtaining history from patient or surrogate    I assumed direction of critical care for this patient from another provider in my specialty: no      Care discussed with: admitting provider         Corinne Boo MD  11/27/23 4427

## 2023-11-25 NOTE — CARE PLAN
The patient's goals for the shift include      The clinical goals for the shift include stable vitals

## 2023-11-26 LAB
ANION GAP SERPL CALC-SCNC: 10 MMOL/L (ref 10–20)
BUN SERPL-MCNC: 8 MG/DL (ref 6–23)
CALCIUM SERPL-MCNC: 8.2 MG/DL (ref 8.6–10.3)
CARDIAC TROPONIN I PNL SERPL HS: 1334 NG/L (ref 0–13)
CHLORIDE SERPL-SCNC: 107 MMOL/L (ref 98–107)
CHOLEST SERPL-MCNC: 184 MG/DL (ref 0–199)
CHOLESTEROL/HDL RATIO: 3.8
CO2 SERPL-SCNC: 24 MMOL/L (ref 21–32)
CREAT SERPL-MCNC: 0.76 MG/DL (ref 0.5–1.05)
ERYTHROCYTE [DISTWIDTH] IN BLOOD BY AUTOMATED COUNT: 12.7 % (ref 11.5–14.5)
GFR SERPL CREATININE-BSD FRML MDRD: >90 ML/MIN/1.73M*2
GLUCOSE SERPL-MCNC: 85 MG/DL (ref 74–99)
HCT VFR BLD AUTO: 35 % (ref 36–46)
HDLC SERPL-MCNC: 48.5 MG/DL
HGB BLD-MCNC: 11.8 G/DL (ref 12–16)
LDLC SERPL CALC-MCNC: 115 MG/DL
MCH RBC QN AUTO: 30.6 PG (ref 26–34)
MCHC RBC AUTO-ENTMCNC: 33.7 G/DL (ref 32–36)
MCV RBC AUTO: 91 FL (ref 80–100)
NON HDL CHOLESTEROL: 136 MG/DL (ref 0–149)
NRBC BLD-RTO: 0 /100 WBCS (ref 0–0)
PLATELET # BLD AUTO: 265 X10*3/UL (ref 150–450)
POTASSIUM SERPL-SCNC: 3.7 MMOL/L (ref 3.5–5.3)
RBC # BLD AUTO: 3.86 X10*6/UL (ref 4–5.2)
SODIUM SERPL-SCNC: 137 MMOL/L (ref 136–145)
TRIGL SERPL-MCNC: 105 MG/DL (ref 0–149)
VLDL: 21 MG/DL (ref 0–40)
WBC # BLD AUTO: 11.7 X10*3/UL (ref 4.4–11.3)

## 2023-11-26 PROCEDURE — 99233 SBSQ HOSP IP/OBS HIGH 50: CPT | Performed by: INTERNAL MEDICINE

## 2023-11-26 PROCEDURE — 2500000004 HC RX 250 GENERAL PHARMACY W/ HCPCS (ALT 636 FOR OP/ED): Performed by: STUDENT IN AN ORGANIZED HEALTH CARE EDUCATION/TRAINING PROGRAM

## 2023-11-26 PROCEDURE — 85027 COMPLETE CBC AUTOMATED: CPT | Performed by: INTERNAL MEDICINE

## 2023-11-26 PROCEDURE — 99291 CRITICAL CARE FIRST HOUR: CPT | Performed by: STUDENT IN AN ORGANIZED HEALTH CARE EDUCATION/TRAINING PROGRAM

## 2023-11-26 PROCEDURE — 36415 COLL VENOUS BLD VENIPUNCTURE: CPT | Performed by: INTERNAL MEDICINE

## 2023-11-26 PROCEDURE — 83718 ASSAY OF LIPOPROTEIN: CPT | Performed by: STUDENT IN AN ORGANIZED HEALTH CARE EDUCATION/TRAINING PROGRAM

## 2023-11-26 PROCEDURE — 2500000001 HC RX 250 WO HCPCS SELF ADMINISTERED DRUGS (ALT 637 FOR MEDICARE OP): Performed by: STUDENT IN AN ORGANIZED HEALTH CARE EDUCATION/TRAINING PROGRAM

## 2023-11-26 PROCEDURE — 2500000001 HC RX 250 WO HCPCS SELF ADMINISTERED DRUGS (ALT 637 FOR MEDICARE OP): Performed by: INTERNAL MEDICINE

## 2023-11-26 PROCEDURE — 83036 HEMOGLOBIN GLYCOSYLATED A1C: CPT | Performed by: STUDENT IN AN ORGANIZED HEALTH CARE EDUCATION/TRAINING PROGRAM

## 2023-11-26 PROCEDURE — 82374 ASSAY BLOOD CARBON DIOXIDE: CPT | Performed by: INTERNAL MEDICINE

## 2023-11-26 PROCEDURE — 84484 ASSAY OF TROPONIN QUANT: CPT | Performed by: STUDENT IN AN ORGANIZED HEALTH CARE EDUCATION/TRAINING PROGRAM

## 2023-11-26 PROCEDURE — 2060000001 HC INTERMEDIATE ICU ROOM DAILY

## 2023-11-26 RX ORDER — METOPROLOL TARTRATE 25 MG/1
12.5 TABLET, FILM COATED ORAL 2 TIMES DAILY
Status: DISCONTINUED | OUTPATIENT
Start: 2023-11-26 | End: 2023-11-27

## 2023-11-26 RX ADMIN — TICAGRELOR 90 MG: 90 TABLET ORAL at 08:35

## 2023-11-26 RX ADMIN — ATORVASTATIN CALCIUM 40 MG: 40 TABLET, FILM COATED ORAL at 21:21

## 2023-11-26 RX ADMIN — METOPROLOL TARTRATE 12.5 MG: 25 TABLET, FILM COATED ORAL at 21:21

## 2023-11-26 RX ADMIN — METOPROLOL TARTRATE 12.5 MG: 25 TABLET, FILM COATED ORAL at 12:36

## 2023-11-26 RX ADMIN — TICAGRELOR 90 MG: 90 TABLET ORAL at 21:21

## 2023-11-26 RX ADMIN — ASPIRIN 81 MG CHEWABLE TABLET 81 MG: 81 TABLET CHEWABLE at 08:35

## 2023-11-26 RX ADMIN — ACETAMINOPHEN 650 MG: 160 SOLUTION ORAL at 14:27

## 2023-11-26 ASSESSMENT — PAIN SCALES - GENERAL
PAINLEVEL_OUTOF10: 0 - NO PAIN
PAINLEVEL_OUTOF10: 1
PAINLEVEL_OUTOF10: 0 - NO PAIN

## 2023-11-26 ASSESSMENT — COGNITIVE AND FUNCTIONAL STATUS - GENERAL
DAILY ACTIVITIY SCORE: 24
MOBILITY SCORE: 24

## 2023-11-26 ASSESSMENT — PAIN - FUNCTIONAL ASSESSMENT
PAIN_FUNCTIONAL_ASSESSMENT: 0-10

## 2023-11-26 ASSESSMENT — PAIN DESCRIPTION - LOCATION: LOCATION: NECK

## 2023-11-26 NOTE — CARE PLAN
The patient's goals for the shift include      The clinical goals for the shift include pt will remain chest pain free throughout shift

## 2023-11-26 NOTE — PROGRESS NOTES
Subjective   Patient is sitting on the chair comfortably, denies any chest pain shortness of breath or palpitations  No other significant overnight issues.      Objective     Intake/Output last 3 shifts:  I/O last 3 completed shifts:  In: 204.5 (2.8 mL/kg) [I.V.:204.5 (2.8 mL/kg)]  Out: 5 (0.1 mL/kg) [Blood:5]  Weight: 72.6 kg     Intake/Output this shift:  No intake/output data recorded.    Recent Results (from the past 48 hour(s))   CBC and Auto Differential    Collection Time: 11/25/23  9:47 AM   Result Value Ref Range    WBC 9.2 4.4 - 11.3 x10*3/uL    nRBC 0.0 0.0 - 0.0 /100 WBCs    RBC 4.30 4.00 - 5.20 x10*6/uL    Hemoglobin 13.4 12.0 - 16.0 g/dL    Hematocrit 38.5 36.0 - 46.0 %    MCV 90 80 - 100 fL    MCH 31.2 26.0 - 34.0 pg    MCHC 34.8 32.0 - 36.0 g/dL    RDW 12.5 11.5 - 14.5 %    Platelets 265 150 - 450 x10*3/uL    Neutrophils % 75.6 40.0 - 80.0 %    Immature Granulocytes %, Automated 0.5 0.0 - 0.9 %    Lymphocytes % 15.6 13.0 - 44.0 %    Monocytes % 6.3 2.0 - 10.0 %    Eosinophils % 1.7 0.0 - 6.0 %    Basophils % 0.3 0.0 - 2.0 %    Neutrophils Absolute 6.90 1.20 - 7.70 x10*3/uL    Immature Granulocytes Absolute, Automated 0.05 0.00 - 0.70 x10*3/uL    Lymphocytes Absolute 1.43 1.20 - 4.80 x10*3/uL    Monocytes Absolute 0.58 0.10 - 1.00 x10*3/uL    Eosinophils Absolute 0.16 0.00 - 0.70 x10*3/uL    Basophils Absolute 0.03 0.00 - 0.10 x10*3/uL   Comprehensive Metabolic Panel    Collection Time: 11/25/23  9:47 AM   Result Value Ref Range    Glucose 86 74 - 99 mg/dL    Sodium 137 136 - 145 mmol/L    Potassium 4.0 3.5 - 5.3 mmol/L    Chloride 105 98 - 107 mmol/L    Bicarbonate 25 21 - 32 mmol/L    Anion Gap 11 10 - 20 mmol/L    Urea Nitrogen 9 6 - 23 mg/dL    Creatinine 0.84 0.50 - 1.05 mg/dL    eGFR 86 >60 mL/min/1.73m*2    Calcium 9.0 8.6 - 10.3 mg/dL    Albumin 4.4 3.4 - 5.0 g/dL    Alkaline Phosphatase 61 33 - 110 U/L    Total Protein 7.2 6.4 - 8.2 g/dL    AST 13 9 - 39 U/L    Bilirubin, Total 0.4 0.0 - 1.2  mg/dL    ALT 12 7 - 45 U/L   Magnesium    Collection Time: 11/25/23  9:47 AM   Result Value Ref Range    Magnesium 1.83 1.60 - 2.40 mg/dL   B-type natriuretic peptide    Collection Time: 11/25/23  9:47 AM   Result Value Ref Range    BNP 22 0 - 99 pg/mL   Troponin I, High Sensitivity, Initial    Collection Time: 11/25/23  9:47 AM   Result Value Ref Range    Troponin I, High Sensitivity 121 (HH) 0 - 13 ng/L   Troponin, High Sensitivity, 1 Hour    Collection Time: 11/25/23 10:44 AM   Result Value Ref Range    Troponin I, High Sensitivity 352 (HH) 0 - 13 ng/L   Troponin I, High Sensitivity    Collection Time: 11/25/23 12:38 PM   Result Value Ref Range    Troponin I, High Sensitivity 717 (HH) 0 - 13 ng/L   Coagulation Screen    Collection Time: 11/25/23  6:36 PM   Result Value Ref Range    Protime 13.8 (H) 9.8 - 12.8 seconds    INR 1.2 (H) 0.9 - 1.1    aPTT 102 (HH) 27 - 38 seconds   Lipid Panel    Collection Time: 11/26/23  4:23 AM   Result Value Ref Range    Cholesterol 184 0 - 199 mg/dL    HDL-Cholesterol 48.5 mg/dL    Cholesterol/HDL Ratio 3.8     LDL Calculated 115 (H) <=99 mg/dL    VLDL 21 0 - 40 mg/dL    Triglycerides 105 0 - 149 mg/dL    Non HDL Cholesterol 136 0 - 149 mg/dL   CBC    Collection Time: 11/26/23  4:23 AM   Result Value Ref Range    WBC 11.7 (H) 4.4 - 11.3 x10*3/uL    nRBC 0.0 0.0 - 0.0 /100 WBCs    RBC 3.86 (L) 4.00 - 5.20 x10*6/uL    Hemoglobin 11.8 (L) 12.0 - 16.0 g/dL    Hematocrit 35.0 (L) 36.0 - 46.0 %    MCV 91 80 - 100 fL    MCH 30.6 26.0 - 34.0 pg    MCHC 33.7 32.0 - 36.0 g/dL    RDW 12.7 11.5 - 14.5 %    Platelets 265 150 - 450 x10*3/uL   Basic Metabolic Panel    Collection Time: 11/26/23  4:23 AM   Result Value Ref Range    Glucose 85 74 - 99 mg/dL    Sodium 137 136 - 145 mmol/L    Potassium 3.7 3.5 - 5.3 mmol/L    Chloride 107 98 - 107 mmol/L    Bicarbonate 24 21 - 32 mmol/L    Anion Gap 10 10 - 20 mmol/L    Urea Nitrogen 8 6 - 23 mg/dL    Creatinine 0.76 0.50 - 1.05 mg/dL    eGFR >90 >60  mL/min/1.73m*2    Calcium 8.2 (L) 8.6 - 10.3 mg/dL   Troponin I, High Sensitivity    Collection Time: 11/26/23  4:23 AM   Result Value Ref Range    Troponin I, High Sensitivity 1,334 (HH) 0 - 13 ng/L        CT angio chest abdomen pelvis  Narrative: Interpreted By:  Javier Low,   STUDY:  CT ANGIO CHEST ABDOMEN PELVIS;  11/25/2023 12:17 pm      INDICATION:  Signs/Symptoms:chest pain, arm numbness.      COMPARISON:  Portable chest earlier same day 25 November 2023 at 1008 hours      ACCESSION NUMBER(S):  [id number]      ORDERING CLINICIAN:  WES MORROW      TECHNIQUE:  CTA of the chest, abdomen and pelvis included CT chest, abdomen  pelvis from the thoracic inlet through the symphysis pubis before and  in the arterial phase after the uneventful administration of  intravenous contrast (100 mL Omnipaque 350)      Three dimensional maximum intensity projection (3-D MIPs) image/s  were created on a separate dedicated workstation, reviewed and saved      FINDINGS:  AORTA:      Acute intramural hematoma: Negative  Penetrating ulcer: Negative  Dissection: Negative  Thoracic aortic aneurysm: Negative  Abdominal aortic aneurysm: Negative      MAJOR AORTIC BRANCH VESSELS:      Brachiocephalic arteries: No significant focal stenosis  Celiac and branches: No significant focal stenosis  SMA and first order branches: No significant focal stenosis  Renal arteries: No significant focal stenosis  LEONOR: No significant focal stenosis  Iliac arteries: No significant focal stenosis  Other: n/a      OTHER CARDIOVASCULAR:  Heart size: Within normal limits      Acute pulmonary embolism: Negative through the lobar (second order)  branch level. Segmental and more distal branches not well enough  opacified to evaluate. Pulmonary arteries ectasia: Negative      Heart failure change: Negative.  No sign of interstitial or alveolar  edema.      Other: n/a      OTHER CHEST:      NONVASCULAR MEDIASTINUM:  Esophagus: Grossly normal by  CT  Mediastinal Mass: Negative  Hiatal hernia: None      LUNGS / AIRSPACES / AIRWAYS:      LARGE AIRWAYS  Filling defect: Negative  Wall thickening: Negative  Bronchiectasis: Negative  Other: N/A      AIRSPACES  Fibrosis: Negative  Emphysema: Negative  Consolidation: Negative  Ground glass airspace disease: Negative  Edema: Negative  Nodule / Mass: Negative  Other: Minimal dependent atelectasis      PLEURA: No pleural effusion or pneumothorax on either side      LYMPH NODES: No thoracic adenopathy      THORACIC SKELETON: No acute findings      CHEST WALL: I suspect small cysts in both breasts. Most recent  dedicated breast imaging are the screening mammograms from August, 2021 at which time at least some of these cysts were present but  unchanged from their respective priors. More recent breast imaging  should be considered unless this patient has up-to-date breast  imaging at some outside institution      -------      CT ABDOMEN AND PELVIS:      LIVER: Normal. No enlargement or evidence of cirrhosis or fatty  change. No mass or other suspect lesion.      SPLEEN: Normal. No enlargement, mass or evidence of splenic vein  thrombosis.      PANCREAS: Normal. No CT evidence of acute or chronic pancreatitis. No  duct dilation. No mass.      GALLBLADDER: Normal CT appearance. No dilation, calcified, or  gas-containing stones.  Other types of gallstones could be occult on  CT and detectable only by ultrasound.      BILE DUCTS: Normal. No biliary duct dilation.      ADRENAL GLANDS: Small right lipid rich adenoma does not need  follow-up. Left side normal. No acute findings or findings needing  follow-up on either side      KIDNEYS AND URETERS: Normal.  No hydronephrosis on either side.  No  mass.  Symmetric enhancement.  No infarct or CT evidence of acute  pyelonephritis.  No substantial radiodense stone.  Tiny stones and  radiolucent stones could be occult on CT.      LYMPH NODES: No adenopathy, intraperitoneal,  retroperitoneal, pelvic  or otherwise      APPENDIX: Normal.  Not dilated, thick walled or in any other way  inflamed in appearance.  No inflammatory change about the appendix.      COLON: Normal. No sign of acute diverticulitis or other colitis. No  annular constricting mass.      SMALL BOWEL: Normal. No small bowel dilation or any other sign of  small bowel obstruction. No sign of active inflammatory bowel disease.      STOMACH / DUODENUM: Grossly normal by CT which has limited  sensitivity and specificity for the stomach and duodenum.      RETROPERITONEUM: Normal.  No acute hemorrhage or inflammatory change.  Lymph nodes in a separate dedicated section.      OMENTUM, MESENTERY AND PERITONEAL SPACES:  Free intraperitoneal air: Negative  Free intraperitoneal fluid: Negative  Abscess: Negative  Other: n/a      URINARY BLADDER: Normal. No wall thickening, large diverticula,  radiodense stone or surrounding inflammatory change.      PELVIS: The uterus is present. Probability of a lower uterine body or  even cervical fibroid anteriorly. One substantial sized cyst in each  adnexa, each about 3 cm diameter. On the left, there is a delineated  attenuation difference with more simple appearing fluid layering on  top of what is probably hemorrhagic fluid      ABDOMINAL WALL:  Hernia: Negative  Other: No acute or contributory abnormality.      ABDOMINAL / PELVIC SKELETON: Grade 1 anterolisthesis of L5 on S1 with  associated chronic bilateral L5 pars defects. No acute findings      Impression: NO ACUTE AORTIC HEMATOMA, PENETRATING ULCER, ANEURYSM, AORTIC  DISSECTION OR ANY OTHER ACUTE FINDINGS IN THE CHEST, ABDOMEN OR PELVIS      CTA OF THE BRACHIOCEPHALIC VESSELS LIKEWISE UNREMARKABLE, WITHOUT  DISSECTION OR HIGH-GRADE STENOSIS      NO ACUTE PULMONARY EMBOLISM THROUGH THE SEGMENTAL BRANCH LEVEL (THIS  EXAM HAS ESSENTIALLY EQUIVALENT SENSITIVITY/SPECIFICITY FOR ACUTE  PULMONARY EMBOLISM EVALUATION AS A DEDICATED CT CHEST FOR  PULMONARY  EMBOLISM EVALUATION AND IS NEGATIVE)      NO ACUTE AIRSPACE DISEASE SUCH AS PNEUMONIA      NO PLEURAL OR PERICARDIAL EFFUSION      NO PNEUMOTHORAX      NO ACUTE FINDINGS IN THE ABDOMEN OR PELVIS; HOWEVER, NONEMERGENT BUT  NEAR TERM PELVIC ULTRASOUND RECOMMENDED TO EVALUATE THE ADNEXA. ONE  PROMINENT CYST ON EACH SIDE, WITH PROBABILITY OF LAYERING HEMORRHAGIC  DEBRIS IN THE LEFT OVARIAN/ADNEXAL CYST      MACRO:  None      Signed by: Javier Low 11/25/2023 12:49 PM  Dictation workstation:   [id number]  XR chest 1 view  Narrative: STUDY:  Chest Radiograph;  11/25/2023 10:14 AM.  INDICATION:  Chest pain.  COMPARISON:  None Available.  ACCESSION NUMBER(S):  [id number]  ORDERING CLINICIAN:  WES MORROW  TECHNIQUE:  Frontal chest was obtained at 10:12 hours.  FINDINGS:  CARDIOMEDIASTINAL SILHOUETTE:  Cardiomediastinal silhouette is normal in size and configuration.     LUNGS:  Lungs are clear.     ABDOMEN:  No remarkable upper abdominal findings.     BONES:  No acute osseous changes.  Impression: No acute cardiopulmonary abnormality.  Signed by Elliot Marshall MD       Vital signs in last 24 hours:  Temp:  [36.4 °C (97.5 °F)-36.8 °C (98.2 °F)] 36.5 °C (97.7 °F)  Heart Rate:  [] 84  Resp:  [0-24] 20  BP: ()/(47-82) 108/62  FiO2 (%):  [21 %] 21 %    Physical Exam  General: No distress  Neck: Supple.  HEENT: Normocephalic atraumatic pupils equal and clear  Heart: S1-S2 heard no murmurs gallops regurgitation  Lungs: Clear to auscultation bilaterally no wheezing rales rhonchi.  Abdomen: Nondistended nontender bowel sounds are present.  Neuro: No focal deficits.  Assessment/Plan   NSTEMI.  Takotsubo cardiomyopathy  Elevated troponins  Obesity.    Plan:  S/p left heart catheterization on 11/25.  Cath results-nonobstructive coronary artery disease.  LV gram showed a hyperdynamic base with akinetic/dyskinetic apex suggestive of Takotsubo cardiomyopathy.  Continuing aspirin, Brilinta and statins as per  cardiology.  Planning for echocardiogram tomorrow.  Continue PT OT.  Follow-up CBC BMP ordered.    DVT prophylaxis:  On dual antiplatelet therapy.    Disposition:  Possible discharge home tomorrow.     LOS: 1 day

## 2023-11-26 NOTE — PROGRESS NOTES
Subjective Data:  Patient doing well postprocedure.  Denies any chest pain or dyspnea.  She is mobilizing without any issues.  Blood pressure low normal.  Repeat troponin today is 1334 from 717 yesterday.    She denies using any contraceptive pills.  She denies use of any hormonal therapy.  She has done tubal ligation.    No significant history of CAD in family.    She does have history of headaches which are chronic and stress related as per patient.    No known history of FMD.    No history of connective tissue or autoimmune disorder.    She does not have multiparity.    She denies any significant stressors or high intensity exercise.    Overnight Events:    No acute overnight events     Objective Data:  Last Recorded Vitals:  Vitals:    11/26/23 0725 11/26/23 0800 11/26/23 0900 11/26/23 1012   BP:       BP Location:       Patient Position:       Pulse: 75 71 84    Resp: (!) 0 14 20    Temp: 36.5 °C (97.7 °F)      TempSrc: Temporal      SpO2: 97% 97% 98%    Weight:    72.6 kg (160 lb 0.9 oz)   Height:           Last Labs:  CBC - 11/26/2023:  4:23 AM  11.7 11.8 265    35.0      CMP - 11/26/2023:  4:23 AM  8.2 7.2 13 --- 0.4   _ 4.4 12 61      PTT - 11/25/2023:  6:36 PM  1.2   13.8 102     TROPHS   Date/Time Value Ref Range Status   11/25/2023 12:38  0 - 13 ng/L Final     Comment:     Previous result verified on 11/25/2023 1022 on specimen/case [id number] called with component TRPHS for procedure Troponin I, High Sensitivity, Initial with value 121 ng/L.   11/25/2023 10:44  0 - 13 ng/L Final     Comment:     Previous result verified on 11/25/2023 1022 on specimen/case [id number] called with component TRPHS for procedure Troponin I, High Sensitivity, Initial with value 121 ng/L.   11/25/2023 09:47  0 - 13 ng/L Final     BNP   Date/Time Value Ref Range Status   11/25/2023 09:47 AM 22 0 - 99 pg/mL Final     LDLCALC   Date/Time Value Ref Range Status   11/26/2023 04:23  <=99 mg/dL Final  "    Comment:                                 Near   Borderline      AGE      Desirable  Optimal    High     High     Very High     0-19 Y     0 - 109     ---    110-129   >/= 130     ----    20-24 Y     0 - 119     ---    120-159   >/= 160     ----      >24 Y     0 -  99   100-129  130-159   160-189     >/=190       VLDL   Date/Time Value Ref Range Status   11/26/2023 04:23 AM 21 0 - 40 mg/dL Final      Last I/O:  I/O last 3 completed shifts:  In: 204.5 (2.8 mL/kg) [I.V.:204.5 (2.8 mL/kg)]  Out: 5 (0.1 mL/kg) [Blood:5]  Weight: 72.6 kg     Past Cardiology Tests (Last 3 Years):  EKG:  No results found for this or any previous visit from the past 1095 days.    Echo:  No results found for this or any previous visit from the past 1095 days.    Ejection Fractions:  No results found for: \"EF\"  Cath:  No results found for this or any previous visit from the past 1095 days.    Stress Test:  No results found for this or any previous visit from the past 1095 days.    Cardiac Imaging:  No results found for this or any previous visit from the past 1095 days.      Inpatient Medications:  Scheduled medications   Medication Dose Route Frequency    aspirin  81 mg oral Daily    atorvastatin  40 mg oral Nightly    polyethylene glycol  17 g oral Daily    ticagrelor  90 mg oral BID     PRN medications   Medication    acetaminophen    Or    acetaminophen    Or    acetaminophen    oxygen     Continuous Medications   Medication Dose Last Rate       Physical Exam:  General: Alert and Oriented, No distress, cooperative  Head: Normocephalic without obvious abnormality, atraumatic  Eyes: Conjunctiva/corneas clear, EOM's grossly intact  Neck: Supple, trachea midline, No thyroid enlargement/tenderness/nodules; No JVD  Lungs: Clear to auscultation bilaterally, no wheezes, rhonci, or rales. respirations unlabored  Heart: Regular rhythm, normal S1/S2, no murmur  Abdomen: Soft, non-tender, Non-distended, bowel sounds active  Extremities: No edema, " no cyanosis, no clubbing  Skin: Skin color, texture, turgor normal.  No rashes or lesions noted  Neurologic: Alert and oriented x 3, grossly moving all extremities, speech intact       Assessment/Plan   -NSTEMI  -Stress induced Cardiomyopathy  -Possible SCAD, type 3  -Hyperlipidemia     Plan:  -Left heart cath showed nonobstructive CAD.  LV gram shows hyperdynamic base with akinetic/dyskinetic apex which may be suggestive of stress-induced/Takotsubo cardiomyopathy.  Alternatively she may have had occluded LAD at some point and at the time of angiography LAD has recanalized and we are seeing wall motion abnormalities due to same.  Distal LAD had diffuse disease which can be seen in patients with scad and this presentation may represent type III SCAD.   -Troponin is still elevated.  Will recommend to continue trending troponin until peaked.  -Patient on DAPT with aspirin and Brilinta.  I will switch to Plavix from Brilinta in the a.m.  Will continue for 1 month.  -Continue on statin therapy given hyperlipidemia.  -I will Will start on low-dose metoprolol to tartrate  -Obtain echocardiogram in a.m.  -Cardiac rehab referral  -She denies using any contraceptive pills.  She denies use of any hormonal therapy.  She has done tubal ligation many years ago. She does have history of headaches which are chronic and stress related as per patient. No known history of FMD. No history of connective tissue or autoimmune disorder.  I will refer to vascular medicine at discharge for SCAD/FMD evaluation and management.    Cardiology will follow please call with any questions      Peripheral IV 11/25/23 20 G Proximal;Right;Anterior Antecubital (Active)   Site Assessment Clean;Dry;Intact 11/26/23 0800   Dressing Type Transparent 11/26/23 0800   Dressing Status Clean;Dry;Occlusive 11/26/23 0800   Number of days: 1       Code Status:  Full Code          Gene Hermosillo MD

## 2023-11-27 ENCOUNTER — PHARMACY VISIT (OUTPATIENT)
Dept: PHARMACY | Facility: CLINIC | Age: 48
End: 2023-11-27
Payer: COMMERCIAL

## 2023-11-27 ENCOUNTER — APPOINTMENT (OUTPATIENT)
Dept: CARDIOLOGY | Facility: HOSPITAL | Age: 48
DRG: 281 | End: 2023-11-27
Payer: COMMERCIAL

## 2023-11-27 VITALS
RESPIRATION RATE: 19 BRPM | SYSTOLIC BLOOD PRESSURE: 125 MMHG | TEMPERATURE: 97.2 F | DIASTOLIC BLOOD PRESSURE: 79 MMHG | OXYGEN SATURATION: 96 % | WEIGHT: 182.1 LBS | HEIGHT: 63 IN | HEART RATE: 64 BPM | BODY MASS INDEX: 32.27 KG/M2

## 2023-11-27 PROBLEM — H66.90 ACUTE OTITIS MEDIA: Status: ACTIVE | Noted: 2023-11-27

## 2023-11-27 PROBLEM — R09.89 SYMPTOMS OF UPPER RESPIRATORY INFECTION (URI): Status: ACTIVE | Noted: 2023-11-27

## 2023-11-27 PROBLEM — K04.7 INFECTED TOOTH: Status: ACTIVE | Noted: 2023-11-27

## 2023-11-27 PROBLEM — H60.92 LEFT OTITIS EXTERNA: Status: ACTIVE | Noted: 2023-11-27

## 2023-11-27 LAB
ANION GAP SERPL CALC-SCNC: 10 MMOL/L (ref 10–20)
BUN SERPL-MCNC: 12 MG/DL (ref 6–23)
CALCIUM SERPL-MCNC: 8.8 MG/DL (ref 8.6–10.3)
CARDIAC TROPONIN I PNL SERPL HS: 1006 NG/L (ref 0–13)
CARDIAC TROPONIN I PNL SERPL HS: 725 NG/L (ref 0–13)
CHLORIDE SERPL-SCNC: 107 MMOL/L (ref 98–107)
CO2 SERPL-SCNC: 26 MMOL/L (ref 21–32)
CREAT SERPL-MCNC: 0.88 MG/DL (ref 0.5–1.05)
EJECTION FRACTION APICAL 4 CHAMBER: 63.5
EJECTION FRACTION: 60
ERYTHROCYTE [DISTWIDTH] IN BLOOD BY AUTOMATED COUNT: 12.7 % (ref 11.5–14.5)
EST. AVERAGE GLUCOSE BLD GHB EST-MCNC: 111 MG/DL
GFR SERPL CREATININE-BSD FRML MDRD: 81 ML/MIN/1.73M*2
GLUCOSE SERPL-MCNC: 92 MG/DL (ref 74–99)
HBA1C MFR BLD: 5.5 %
HCT VFR BLD AUTO: 36.5 % (ref 36–46)
HGB BLD-MCNC: 12.2 G/DL (ref 12–16)
LEFT ATRIUM VOLUME AREA LENGTH INDEX BSA: 26.9
LEFT VENTRICLE INTERNAL DIMENSION DIASTOLE: 4.5 (ref 3.5–6)
LEFT VENTRICULAR OUTFLOW TRACT DIAMETER: 1.8
MCH RBC QN AUTO: 30.9 PG (ref 26–34)
MCHC RBC AUTO-ENTMCNC: 33.4 G/DL (ref 32–36)
MCV RBC AUTO: 92 FL (ref 80–100)
MITRAL VALVE E/A RATIO: 1.32
MITRAL VALVE E/E' RATIO: 7.57
NRBC BLD-RTO: 0 /100 WBCS (ref 0–0)
PLATELET # BLD AUTO: 242 X10*3/UL (ref 150–450)
POTASSIUM SERPL-SCNC: 4.1 MMOL/L (ref 3.5–5.3)
RBC # BLD AUTO: 3.95 X10*6/UL (ref 4–5.2)
RIGHT VENTRICLE FREE WALL PEAK S': 12.4
RIGHT VENTRICLE PEAK SYSTOLIC PRESSURE: 21.8
SODIUM SERPL-SCNC: 139 MMOL/L (ref 136–145)
TRICUSPID ANNULAR PLANE SYSTOLIC EXCURSION: 2.7
WBC # BLD AUTO: 9.9 X10*3/UL (ref 4.4–11.3)

## 2023-11-27 PROCEDURE — 84484 ASSAY OF TROPONIN QUANT: CPT | Performed by: STUDENT IN AN ORGANIZED HEALTH CARE EDUCATION/TRAINING PROGRAM

## 2023-11-27 PROCEDURE — 93306 TTE W/DOPPLER COMPLETE: CPT | Performed by: INTERNAL MEDICINE

## 2023-11-27 PROCEDURE — 2500000004 HC RX 250 GENERAL PHARMACY W/ HCPCS (ALT 636 FOR OP/ED): Performed by: STUDENT IN AN ORGANIZED HEALTH CARE EDUCATION/TRAINING PROGRAM

## 2023-11-27 PROCEDURE — 36415 COLL VENOUS BLD VENIPUNCTURE: CPT | Performed by: STUDENT IN AN ORGANIZED HEALTH CARE EDUCATION/TRAINING PROGRAM

## 2023-11-27 PROCEDURE — RXMED WILLOW AMBULATORY MEDICATION CHARGE

## 2023-11-27 PROCEDURE — 93306 TTE W/DOPPLER COMPLETE: CPT

## 2023-11-27 PROCEDURE — 80048 BASIC METABOLIC PNL TOTAL CA: CPT | Performed by: INTERNAL MEDICINE

## 2023-11-27 PROCEDURE — 99239 HOSP IP/OBS DSCHRG MGMT >30: CPT | Performed by: INTERNAL MEDICINE

## 2023-11-27 PROCEDURE — 36415 COLL VENOUS BLD VENIPUNCTURE: CPT | Performed by: INTERNAL MEDICINE

## 2023-11-27 PROCEDURE — 99291 CRITICAL CARE FIRST HOUR: CPT | Performed by: STUDENT IN AN ORGANIZED HEALTH CARE EDUCATION/TRAINING PROGRAM

## 2023-11-27 PROCEDURE — 85027 COMPLETE CBC AUTOMATED: CPT | Performed by: INTERNAL MEDICINE

## 2023-11-27 PROCEDURE — 2500000001 HC RX 250 WO HCPCS SELF ADMINISTERED DRUGS (ALT 637 FOR MEDICARE OP): Performed by: STUDENT IN AN ORGANIZED HEALTH CARE EDUCATION/TRAINING PROGRAM

## 2023-11-27 RX ORDER — CLOPIDOGREL BISULFATE 75 MG/1
75 TABLET ORAL DAILY
Qty: 30 TABLET | Refills: 11 | Status: SHIPPED | OUTPATIENT
Start: 2023-11-28 | End: 2024-01-30 | Stop reason: WASHOUT

## 2023-11-27 RX ORDER — CLOPIDOGREL BISULFATE 300 MG/1
600 TABLET, FILM COATED ORAL ONCE
Status: COMPLETED | OUTPATIENT
Start: 2023-11-27 | End: 2023-11-27

## 2023-11-27 RX ORDER — ATORVASTATIN CALCIUM 40 MG/1
40 TABLET, FILM COATED ORAL NIGHTLY
Qty: 30 TABLET | Refills: 1 | Status: SHIPPED | OUTPATIENT
Start: 2023-11-27 | End: 2024-02-01 | Stop reason: SDUPTHER

## 2023-11-27 RX ORDER — TRIAMCINOLONE ACETONIDE 1 MG/G
CREAM TOPICAL
COMMUNITY
Start: 2023-09-19

## 2023-11-27 RX ORDER — NAPROXEN SODIUM 220 MG/1
81 TABLET, FILM COATED ORAL DAILY
Qty: 30 TABLET | Refills: 1 | Status: SHIPPED | OUTPATIENT
Start: 2023-11-28 | End: 2024-02-01 | Stop reason: SDUPTHER

## 2023-11-27 RX ORDER — METOPROLOL TARTRATE 25 MG/1
25 TABLET, FILM COATED ORAL 2 TIMES DAILY
Status: DISCONTINUED | OUTPATIENT
Start: 2023-11-27 | End: 2023-11-27 | Stop reason: HOSPADM

## 2023-11-27 RX ORDER — METOPROLOL TARTRATE 25 MG/1
25 TABLET, FILM COATED ORAL 2 TIMES DAILY
Qty: 60 TABLET | Refills: 0 | Status: SHIPPED | OUTPATIENT
Start: 2023-11-27 | End: 2023-12-05 | Stop reason: SDUPTHER

## 2023-11-27 RX ORDER — CLOPIDOGREL BISULFATE 75 MG/1
75 TABLET ORAL DAILY
Status: DISCONTINUED | OUTPATIENT
Start: 2023-11-28 | End: 2023-11-27 | Stop reason: HOSPADM

## 2023-11-27 RX ADMIN — ASPIRIN 81 MG CHEWABLE TABLET 81 MG: 81 TABLET CHEWABLE at 09:24

## 2023-11-27 RX ADMIN — CLOPIDOGREL BISULFATE 600 MG: 300 TABLET, FILM COATED ORAL at 09:24

## 2023-11-27 RX ADMIN — METOPROLOL TARTRATE 25 MG: 25 TABLET, FILM COATED ORAL at 09:24

## 2023-11-27 RX ADMIN — ACETAMINOPHEN 650 MG: 325 TABLET ORAL at 14:08

## 2023-11-27 RX ADMIN — HUMAN ALBUMIN MICROSPHERES AND PERFLUTREN 0.5 ML: 10; .22 INJECTION, SOLUTION INTRAVENOUS at 09:26

## 2023-11-27 ASSESSMENT — PAIN SCALES - GENERAL
PAINLEVEL_OUTOF10: 0 - NO PAIN
PAINLEVEL_OUTOF10: 0 - NO PAIN

## 2023-11-27 ASSESSMENT — PAIN - FUNCTIONAL ASSESSMENT
PAIN_FUNCTIONAL_ASSESSMENT: 0-10
PAIN_FUNCTIONAL_ASSESSMENT: 0-10

## 2023-11-27 ASSESSMENT — ACTIVITIES OF DAILY LIVING (ADL): LACK_OF_TRANSPORTATION: NO

## 2023-11-27 NOTE — PROGRESS NOTES
Medication Education     Medication education for Mandie Arevalo was provided to the patient  for the following medication(s):    Atorvastatin 40 mg daily  Aspirin 81 mg daily  Metoprolol tartrate 25 mg by mouth twice daily  Clopidogrel 75 mg daily    Medication education provided by a Pharmacist:  Dose, frequency, storage Proper dose, indication, possible ADRs    Identified potential barriers to education:  None    Method(s) of Education:  Verbal    An opportunity to ask questions and receive answers was provided.     Assessment of understanding the patient :  2= meets goals/outcomes      Sary Garcia   PharmD Candidate 2024

## 2023-11-27 NOTE — PROGRESS NOTES
Subjective Data:  Patient doing well overnight. BP is stable. She is chest pain free. She is ambulating without issues.     Echo was briefly reviewed today. EF is normal. Base is not hyperdynamic. [place] wall motion abnormality noted on echo. See full report for details.        She denies using any contraceptive pills.  She denies use of any hormonal therapy.  She has done tubal ligation.     No significant history of CAD in family. No h/o SCAD in family.      She does have history of headaches which are chronic and stress related as per patient.     No known history of FMD.     No history of connective tissue or autoimmune disorder.     She does not have multiparity.     She denies any significant stressors or high intensity exercise.    Overnight Events:    No acute overnight events.      Objective Data:  Last Recorded Vitals:  Vitals:    11/26/23 2000 11/26/23 2333 11/27/23 0000 11/27/23 0400   BP: 102/71  90/53 103/63   BP Location: Left arm  Left arm Left arm   Patient Position: Lying  Lying Lying   Pulse: 77  75 64   Resp: 17  14 17   Temp:   36.4 °C (97.5 °F) 36.4 °C (97.5 °F)   TempSrc:   Temporal Temporal   SpO2: 94% 98% 95% 95%   Weight:    82.6 kg (182 lb 1.6 oz)   Height:           Last Labs:  CBC - 11/27/2023:  3:49 AM  9.9 12.2 242    36.5      CMP - 11/27/2023:  3:49 AM  8.8 7.2 13 --- 0.4   _ 4.4 12 61      PTT - 11/25/2023:  6:36 PM  1.2   13.8 102     TROPHS   Date/Time Value Ref Range Status   11/27/2023 03:49  0 - 13 ng/L Final   11/26/2023 04:23 AM 1,334 0 - 13 ng/L Final   11/25/2023 12:38  0 - 13 ng/L Final     Comment:     Previous result verified on 11/25/2023 1022 on specimen/case [id number] called with component [facility] for procedure Troponin I, High Sensitivity, Initial with value 121 ng/L.     BNP   Date/Time Value Ref Range Status   11/25/2023 09:47 AM 22 0 - 99 pg/mL Final     LDLCALC   Date/Time Value Ref Range Status   11/26/2023 04:23  <=99 mg/dL Final      "Comment:                                 Near   Borderline      AGE      Desirable  Optimal    High     High     Very High     0-19 Y     0 - 109     ---    110-129   >/= 130     ----    20-24 Y     0 - 119     ---    120-159   >/= 160     ----      >24 Y     0 -  99   100-129  130-159   160-189     >/=190       VLDL   Date/Time Value Ref Range Status   11/26/2023 04:23 AM 21 0 - 40 mg/dL Final      Last I/O:  I/O last 3 completed shifts:  In: 204.5 (2.5 mL/kg) [I.V.:204.5 (2.5 mL/kg)]  Out: - (0 mL/kg)   Weight: 82.6 kg     Past Cardiology Tests (Last 3 Years):  EKG:  No results found for this or any previous visit from the past 1095 days.    Echo:  No results found for this or any previous visit from the past 1095 days.    Ejection Fractions:  No results found for: \"EF\"  Cath:  No results found for this or any previous visit from the past 1095 days.    Stress Test:  No results found for this or any previous visit from the past 1095 days.    Cardiac Imaging:  No results found for this or any previous visit from the past 1095 days.      Inpatient Medications:  Scheduled medications   Medication Dose Route Frequency    aspirin  81 mg oral Daily    atorvastatin  40 mg oral Nightly    clopidogrel  600 mg oral Once    And    [START ON 11/28/2023] clopidogrel  75 mg oral Daily    metoprolol tartrate  25 mg oral BID    polyethylene glycol  17 g oral Daily     PRN medications   Medication    acetaminophen    Or    acetaminophen    Or    acetaminophen    oxygen     Continuous Medications   Medication Dose Last Rate       Physical Exam:  General: Alert and Oriented, No distress, cooperative  Head: Normocephalic without obvious abnormality, atraumatic  Eyes: Conjunctiva/corneas clear, EOM's grossly intact  Neck: Supple, trachea midline, No thyroid enlargement/tenderness/nodules; No JVD  Lungs: Clear to auscultation bilaterally, no wheezes, rhonci, or rales. respirations unlabored  Heart: Regular rhythm, normal S1/S2, no " murmur  Abdomen: Soft, non-tender, Non-distended, bowel sounds active  Extremities: No edema, no cyanosis, no clubbing  Skin: Skin color, texture, turgor normal.  No rashes or lesions noted  Neurologic: Alert and oriented x 3, grossly moving all extremities, speech intact        Assessment/Plan     -NSTEMI  -Stress induced Cardiomyopathy  -Possible SCAD, type 3  -Hyperlipidemia     Plan:  -Left heart cath showed nonobstructive CAD.  LV gram shows hyperdynamic base with akinetic/dyskinetic apex which may be suggestive of stress-induced/Takotsubo cardiomyopathy.  Alternatively she may have had occluded LAD at some point and at the time of angiography the LAD has recanalized and we are seeing wall motion abnormalities due to same.  Distal LAD had diffuse disease which can be seen in patients with scad and this presentation may represent type III SCAD. Will manage conservatively.   -Troponin have down trended.  Troponin peaked at 1334 and then downtrended to 725  -I will continue ASA 81 mg daily. I will switch her to Plavix today from Ticagrelor. Will continue DAPT with ASA and Plavix for one month and then ASA 81 mg daily afterwards   -Continue on statin therapy given hyperlipidemia.  -We will continue metoprolol tartrate 25 mg bid   -Cardiac rehab referral  -She denies using any contraceptive pills.  She denies use of any hormonal therapy.  She has done tubal ligation many years ago. She does have history of headaches which are chronic and stress related as per patient. No known history of FMD. No history of connective tissue or autoimmune disorder.  I will refer to vascular medicine at discharge for SCAD/FMD evaluation and management.   -Will recommend to limit exertional activities, no lifting of >10 lb, avoid intense exertion/exercise, avoid hormonal therapy, avoidance of stress.   -Will repeat echocardiogram as outpatient in 6 weeks. No plan for repeat angiography at this time unless new angina, changes in  symptoms or new echo abnormalities.     Patient will need follow up with cardiology in one week.      Will recommend to avoid lifting >>10 lb weight   Peripheral IV 11/25/23 20 G Proximal;Right;Anterior Antecubital (Active)   Site Assessment Clean;Dry;Intact 11/27/23 0400   Dressing Type Transparent 11/27/23 0400   Line Status Saline locked 11/27/23 0400   Dressing Status Clean;Dry;Occlusive 11/27/23 0400   Number of days: 2       Code Status:  Full Code            Gene Hermosillo MD

## 2023-11-27 NOTE — PROGRESS NOTES
11/27/23 1100   Discharge Planning   Living Arrangements Spouse/significant other;Children   Support Systems Spouse/significant other;Children   Assistance Needed none   Type of Residence Private residence   Home or Post Acute Services None   Patient expects to be discharged to: home   Does the patient need discharge transport arranged? No   Transportation Needs   In the past 12 months, has lack of transportation kept you from medical appointments or from getting medications? no   In the past 12 months, has lack of transportation kept you from meetings, work, or from getting things needed for daily living? No     Met with patient at bedside to discuss discharge planning.   Pt is alert and oriented x 4, sitting up in chair.  Pt lives with  and son.  Pt is independent, no devices, no home care services utilized or needed.  Pt will be discharged home no needs.

## 2023-11-27 NOTE — DISCHARGE SUMMARY
"Discharge Diagnosis  NSTEMI.  Takotsubo cardiomyopathy  Elevated troponins  Obesity.    Test Results Pending At Discharge  Pending Labs       Order Current Status    Troponin I, High Sensitivity Collected (11/27/23 1418)            Hospital Course  Patient initially got admitted to hospital because of history of chest pain, troponins were elevated, diagnosed with NSTEMI.  Initially started with heparin by weight, as the troponins are continue to trend up and continue to complain of chest pain, cardiology evaluated and \"left heart catheterization on the day of admission.  Coronaries with normal limits, LV gram showed hyperdynamic base with akinetic/dyskinetic apex suggestive of Takotsubo cardiomyopathy.  Cardiology repeated echocardiogram on 11/27-apical wma but otherwise good   Recommended to discharge patient on dual antiplatelet therapy statins and beta-blockers.  Prescription sent in.  Discharging patient home in stable condition.    Pertinent Physical Exam At Time of Discharge  Physical Exam  General: No distress  Neck: Supple.  HEENT: Normocephalic atraumatic pupils equal and clear  Heart: S1-S2 heard no murmurs gallops regurgitation  Lungs: Clear to auscultation bilaterally no wheezing rales rhonchi.  Abdomen: Nondistended nontender bowel sounds are present.  Neuro: No focal deficits.  Home Medications     Medication List      START taking these medications     aspirin 81 mg chewable tablet; Chew 1 tablet (81 mg) once daily. Do not   start before November 28, 2023.; Start taking on: November 28, 2023   atorvastatin 40 mg tablet; Commonly known as: Lipitor; Take 1 tablet (40   mg) by mouth once daily at bedtime.   clopidogrel 75 mg tablet; Commonly known as: Plavix; Take 1 tablet (75   mg) by mouth once daily for 365 doses. Do not start before November 28, 2023.; Start taking on: November 28, 2023   metoprolol tartrate 25 mg tablet; Commonly known as: Lopressor; Take 1   tablet (25 mg) by mouth 2 times a " day.       Outpatient Follow-Up  Future Appointments   Date Time Provider Department Center   12/5/2023  2:40 PM Genemc Hermosillo MD [id number] [place]       Jeremi Palmer MD

## 2023-11-28 LAB — ACT BLD: 180 SEC (ref 89–169)

## 2023-11-29 ENCOUNTER — APPOINTMENT (OUTPATIENT)
Dept: CARDIOLOGY | Facility: HOSPITAL | Age: 48
DRG: 281 | End: 2023-11-29
Payer: COMMERCIAL

## 2023-11-29 ENCOUNTER — HOSPITAL ENCOUNTER (INPATIENT)
Facility: HOSPITAL | Age: 48
LOS: 4 days | Discharge: HOME | DRG: 281 | End: 2023-12-03
Attending: STUDENT IN AN ORGANIZED HEALTH CARE EDUCATION/TRAINING PROGRAM | Admitting: INTERNAL MEDICINE
Payer: COMMERCIAL

## 2023-11-29 DIAGNOSIS — I21.4 NSTEMI (NON-ST ELEVATED MYOCARDIAL INFARCTION) (MULTI): ICD-10-CM

## 2023-11-29 DIAGNOSIS — R07.9 ACUTE CHEST PAIN: ICD-10-CM

## 2023-11-29 DIAGNOSIS — I25.42 SPONTANEOUS DISSECTION OF CORONARY ARTERY: Primary | ICD-10-CM

## 2023-11-29 LAB
ALBUMIN SERPL BCP-MCNC: 4.3 G/DL (ref 3.4–5)
ALP SERPL-CCNC: 62 U/L (ref 33–110)
ALT SERPL W P-5'-P-CCNC: 10 U/L (ref 7–45)
ANION GAP BLDV CALCULATED.4IONS-SCNC: 11 MMOL/L (ref 10–25)
ANION GAP SERPL CALC-SCNC: 11 MMOL/L (ref 10–20)
APTT PPP: 34 SECONDS (ref 27–38)
AST SERPL W P-5'-P-CCNC: 13 U/L (ref 9–39)
BASE EXCESS BLDV CALC-SCNC: 0.9 MMOL/L (ref -2–3)
BASOPHILS # BLD AUTO: 0.06 X10*3/UL (ref 0–0.1)
BASOPHILS NFR BLD AUTO: 0.5 %
BILIRUB SERPL-MCNC: 0.3 MG/DL (ref 0–1.2)
BNP SERPL-MCNC: 93 PG/ML (ref 0–99)
BODY TEMPERATURE: 37 DEGREES CELSIUS
BUN SERPL-MCNC: 10 MG/DL (ref 6–23)
CA-I BLDV-SCNC: 1.13 MMOL/L (ref 1.1–1.33)
CALCIUM SERPL-MCNC: 8.9 MG/DL (ref 8.6–10.3)
CARDIAC TROPONIN I PNL SERPL HS: 450 NG/L (ref 0–13)
CARDIAC TROPONIN I PNL SERPL HS: 5818 NG/L (ref 0–13)
CARDIAC TROPONIN I PNL SERPL HS: 682 NG/L (ref 0–13)
CHLORIDE BLDV-SCNC: 103 MMOL/L (ref 98–107)
CHLORIDE SERPL-SCNC: 102 MMOL/L (ref 98–107)
CO2 SERPL-SCNC: 23 MMOL/L (ref 21–32)
CREAT SERPL-MCNC: 0.77 MG/DL (ref 0.5–1.05)
D DIMER PPP FEU-MCNC: 1096 NG/ML FEU
EJECTION FRACTION APICAL 4 CHAMBER: 56.7
EJECTION FRACTION: 58
EOSINOPHIL # BLD AUTO: 0.28 X10*3/UL (ref 0–0.7)
EOSINOPHIL NFR BLD AUTO: 2.5 %
ERYTHROCYTE [DISTWIDTH] IN BLOOD BY AUTOMATED COUNT: 12.2 % (ref 11.5–14.5)
GFR SERPL CREATININE-BSD FRML MDRD: >90 ML/MIN/1.73M*2
GLUCOSE BLDV-MCNC: 113 MG/DL (ref 74–99)
GLUCOSE SERPL-MCNC: 107 MG/DL (ref 74–99)
HCO3 BLDV-SCNC: 23.8 MMOL/L (ref 22–26)
HCT VFR BLD AUTO: 38.1 % (ref 36–46)
HCT VFR BLD EST: 41 % (ref 36–46)
HGB BLD-MCNC: 13.2 G/DL (ref 12–16)
HGB BLDV-MCNC: 13.5 G/DL (ref 12–16)
IMM GRANULOCYTES # BLD AUTO: 0.07 X10*3/UL (ref 0–0.7)
IMM GRANULOCYTES NFR BLD AUTO: 0.6 % (ref 0–0.9)
INHALED O2 CONCENTRATION: 21 %
INR PPP: 1 (ref 0.9–1.1)
LACTATE BLDV-SCNC: 1.4 MMOL/L (ref 0.4–2)
LEFT VENTRICLE INTERNAL DIMENSION DIASTOLE: 4.61 (ref 3.5–6)
LYMPHOCYTES # BLD AUTO: 2.44 X10*3/UL (ref 1.2–4.8)
LYMPHOCYTES NFR BLD AUTO: 22.2 %
MAGNESIUM SERPL-MCNC: 1.73 MG/DL (ref 1.6–2.4)
MCH RBC QN AUTO: 30.6 PG (ref 26–34)
MCHC RBC AUTO-ENTMCNC: 34.6 G/DL (ref 32–36)
MCV RBC AUTO: 88 FL (ref 80–100)
MITRAL VALVE E/A RATIO: 0.79
MONOCYTES # BLD AUTO: 0.9 X10*3/UL (ref 0.1–1)
MONOCYTES NFR BLD AUTO: 8.2 %
NEUTROPHILS # BLD AUTO: 7.26 X10*3/UL (ref 1.2–7.7)
NEUTROPHILS NFR BLD AUTO: 66 %
NRBC BLD-RTO: 0 /100 WBCS (ref 0–0)
OXYHGB MFR BLDV: 45 % (ref 45–75)
PCO2 BLDV: 32 MM HG (ref 41–51)
PH BLDV: 7.48 PH (ref 7.33–7.43)
PHOSPHATE SERPL-MCNC: 2.6 MG/DL (ref 2.5–4.9)
PLATELET # BLD AUTO: 282 X10*3/UL (ref 150–450)
PO2 BLDV: 30 MM HG (ref 35–45)
POTASSIUM BLDV-SCNC: 3.5 MMOL/L (ref 3.5–5.3)
POTASSIUM SERPL-SCNC: 3.4 MMOL/L (ref 3.5–5.3)
PROT SERPL-MCNC: 7.1 G/DL (ref 6.4–8.2)
PROTHROMBIN TIME: 11.8 SECONDS (ref 9.8–12.8)
RBC # BLD AUTO: 4.31 X10*6/UL (ref 4–5.2)
SAO2 % BLDV: 46 % (ref 45–75)
SODIUM BLDV-SCNC: 134 MMOL/L (ref 136–145)
SODIUM SERPL-SCNC: 133 MMOL/L (ref 136–145)
WBC # BLD AUTO: 11 X10*3/UL (ref 4.4–11.3)

## 2023-11-29 PROCEDURE — 84100 ASSAY OF PHOSPHORUS: CPT | Performed by: STUDENT IN AN ORGANIZED HEALTH CARE EDUCATION/TRAINING PROGRAM

## 2023-11-29 PROCEDURE — 93308 TTE F-UP OR LMTD: CPT

## 2023-11-29 PROCEDURE — 36415 COLL VENOUS BLD VENIPUNCTURE: CPT | Performed by: STUDENT IN AN ORGANIZED HEALTH CARE EDUCATION/TRAINING PROGRAM

## 2023-11-29 PROCEDURE — 82330 ASSAY OF CALCIUM: CPT | Performed by: STUDENT IN AN ORGANIZED HEALTH CARE EDUCATION/TRAINING PROGRAM

## 2023-11-29 PROCEDURE — 2500000005 HC RX 250 GENERAL PHARMACY W/O HCPCS: Performed by: STUDENT IN AN ORGANIZED HEALTH CARE EDUCATION/TRAINING PROGRAM

## 2023-11-29 PROCEDURE — 2500000004 HC RX 250 GENERAL PHARMACY W/ HCPCS (ALT 636 FOR OP/ED): Performed by: INTERNAL MEDICINE

## 2023-11-29 PROCEDURE — 2060000001 HC INTERMEDIATE ICU ROOM DAILY

## 2023-11-29 PROCEDURE — 2500000001 HC RX 250 WO HCPCS SELF ADMINISTERED DRUGS (ALT 637 FOR MEDICARE OP): Performed by: INTERNAL MEDICINE

## 2023-11-29 PROCEDURE — 96374 THER/PROPH/DIAG INJ IV PUSH: CPT

## 2023-11-29 PROCEDURE — 2500000004 HC RX 250 GENERAL PHARMACY W/ HCPCS (ALT 636 FOR OP/ED)

## 2023-11-29 PROCEDURE — 83735 ASSAY OF MAGNESIUM: CPT | Performed by: STUDENT IN AN ORGANIZED HEALTH CARE EDUCATION/TRAINING PROGRAM

## 2023-11-29 PROCEDURE — 99223 1ST HOSP IP/OBS HIGH 75: CPT | Performed by: INTERNAL MEDICINE

## 2023-11-29 PROCEDURE — 96376 TX/PRO/DX INJ SAME DRUG ADON: CPT

## 2023-11-29 PROCEDURE — 84295 ASSAY OF SERUM SODIUM: CPT | Performed by: STUDENT IN AN ORGANIZED HEALTH CARE EDUCATION/TRAINING PROGRAM

## 2023-11-29 PROCEDURE — 85025 COMPLETE CBC W/AUTO DIFF WBC: CPT | Performed by: STUDENT IN AN ORGANIZED HEALTH CARE EDUCATION/TRAINING PROGRAM

## 2023-11-29 PROCEDURE — 85379 FIBRIN DEGRADATION QUANT: CPT | Performed by: STUDENT IN AN ORGANIZED HEALTH CARE EDUCATION/TRAINING PROGRAM

## 2023-11-29 PROCEDURE — 93010 ELECTROCARDIOGRAM REPORT: CPT | Performed by: INTERNAL MEDICINE

## 2023-11-29 PROCEDURE — 84132 ASSAY OF SERUM POTASSIUM: CPT | Performed by: STUDENT IN AN ORGANIZED HEALTH CARE EDUCATION/TRAINING PROGRAM

## 2023-11-29 PROCEDURE — 85730 THROMBOPLASTIN TIME PARTIAL: CPT | Performed by: STUDENT IN AN ORGANIZED HEALTH CARE EDUCATION/TRAINING PROGRAM

## 2023-11-29 PROCEDURE — 80053 COMPREHEN METABOLIC PANEL: CPT | Performed by: STUDENT IN AN ORGANIZED HEALTH CARE EDUCATION/TRAINING PROGRAM

## 2023-11-29 PROCEDURE — 2500000004 HC RX 250 GENERAL PHARMACY W/ HCPCS (ALT 636 FOR OP/ED): Performed by: STUDENT IN AN ORGANIZED HEALTH CARE EDUCATION/TRAINING PROGRAM

## 2023-11-29 PROCEDURE — 99285 EMERGENCY DEPT VISIT HI MDM: CPT | Mod: 25 | Performed by: STUDENT IN AN ORGANIZED HEALTH CARE EDUCATION/TRAINING PROGRAM

## 2023-11-29 PROCEDURE — 83880 ASSAY OF NATRIURETIC PEPTIDE: CPT | Performed by: STUDENT IN AN ORGANIZED HEALTH CARE EDUCATION/TRAINING PROGRAM

## 2023-11-29 PROCEDURE — 84484 ASSAY OF TROPONIN QUANT: CPT | Performed by: NURSE PRACTITIONER

## 2023-11-29 PROCEDURE — 82435 ASSAY OF BLOOD CHLORIDE: CPT | Performed by: STUDENT IN AN ORGANIZED HEALTH CARE EDUCATION/TRAINING PROGRAM

## 2023-11-29 PROCEDURE — 84484 ASSAY OF TROPONIN QUANT: CPT | Performed by: STUDENT IN AN ORGANIZED HEALTH CARE EDUCATION/TRAINING PROGRAM

## 2023-11-29 PROCEDURE — 93308 TTE F-UP OR LMTD: CPT | Performed by: INTERNAL MEDICINE

## 2023-11-29 PROCEDURE — 96375 TX/PRO/DX INJ NEW DRUG ADDON: CPT

## 2023-11-29 RX ORDER — ISOSORBIDE DINITRATE 10 MG/1
10 TABLET ORAL
Status: DISCONTINUED | OUTPATIENT
Start: 2023-11-29 | End: 2023-12-03

## 2023-11-29 RX ORDER — ACETAMINOPHEN 325 MG/1
650 TABLET ORAL EVERY 4 HOURS PRN
Status: DISCONTINUED | OUTPATIENT
Start: 2023-11-29 | End: 2023-12-03 | Stop reason: HOSPADM

## 2023-11-29 RX ORDER — HEPARIN SODIUM 5000 [USP'U]/ML
4000 INJECTION, SOLUTION INTRAVENOUS; SUBCUTANEOUS ONCE
Status: COMPLETED | OUTPATIENT
Start: 2023-11-29 | End: 2023-11-29

## 2023-11-29 RX ORDER — METOPROLOL TARTRATE 25 MG/1
25 TABLET, FILM COATED ORAL 4 TIMES DAILY
Status: DISCONTINUED | OUTPATIENT
Start: 2023-11-29 | End: 2023-11-30

## 2023-11-29 RX ORDER — LORAZEPAM 2 MG/ML
1 INJECTION INTRAMUSCULAR ONCE
Status: COMPLETED | OUTPATIENT
Start: 2023-11-29 | End: 2023-11-29

## 2023-11-29 RX ORDER — ATORVASTATIN CALCIUM 40 MG/1
40 TABLET, FILM COATED ORAL NIGHTLY
Status: DISCONTINUED | OUTPATIENT
Start: 2023-11-29 | End: 2023-12-03 | Stop reason: HOSPADM

## 2023-11-29 RX ORDER — HEPARIN SODIUM 5000 [USP'U]/ML
2000-4000 INJECTION, SOLUTION INTRAVENOUS; SUBCUTANEOUS EVERY 4 HOURS PRN
Status: DISCONTINUED | OUTPATIENT
Start: 2023-11-29 | End: 2023-11-30

## 2023-11-29 RX ORDER — HEPARIN SODIUM 10000 [USP'U]/100ML
0-4000 INJECTION, SOLUTION INTRAVENOUS CONTINUOUS
Status: DISCONTINUED | OUTPATIENT
Start: 2023-11-29 | End: 2023-11-30

## 2023-11-29 RX ORDER — CLOPIDOGREL BISULFATE 75 MG/1
75 TABLET ORAL DAILY
Status: DISCONTINUED | OUTPATIENT
Start: 2023-11-29 | End: 2023-12-03 | Stop reason: HOSPADM

## 2023-11-29 RX ORDER — ONDANSETRON HYDROCHLORIDE 2 MG/ML
4 INJECTION, SOLUTION INTRAVENOUS ONCE
Status: COMPLETED | OUTPATIENT
Start: 2023-11-29 | End: 2023-11-29

## 2023-11-29 RX ORDER — POTASSIUM CHLORIDE 750 MG/1
40 TABLET, FILM COATED, EXTENDED RELEASE ORAL ONCE
Status: COMPLETED | OUTPATIENT
Start: 2023-11-29 | End: 2023-11-29

## 2023-11-29 RX ORDER — ONDANSETRON HYDROCHLORIDE 2 MG/ML
INJECTION, SOLUTION INTRAVENOUS
Status: COMPLETED
Start: 2023-11-29 | End: 2023-11-29

## 2023-11-29 RX ORDER — METOPROLOL TARTRATE 25 MG/1
25 TABLET, FILM COATED ORAL 2 TIMES DAILY
Status: DISCONTINUED | OUTPATIENT
Start: 2023-11-29 | End: 2023-11-29

## 2023-11-29 RX ORDER — OXYCODONE AND ACETAMINOPHEN 5; 325 MG/1; MG/1
1 TABLET ORAL EVERY 6 HOURS PRN
Status: DISCONTINUED | OUTPATIENT
Start: 2023-11-29 | End: 2023-12-03 | Stop reason: HOSPADM

## 2023-11-29 RX ORDER — MORPHINE SULFATE 4 MG/ML
4 INJECTION, SOLUTION INTRAMUSCULAR; INTRAVENOUS ONCE
Status: COMPLETED | OUTPATIENT
Start: 2023-11-29 | End: 2023-11-29

## 2023-11-29 RX ORDER — FENTANYL CITRATE 50 UG/ML
50 INJECTION, SOLUTION INTRAMUSCULAR; INTRAVENOUS ONCE
Status: COMPLETED | OUTPATIENT
Start: 2023-11-29 | End: 2023-11-29

## 2023-11-29 RX ORDER — MORPHINE SULFATE 2 MG/ML
2 INJECTION, SOLUTION INTRAMUSCULAR; INTRAVENOUS ONCE
Status: COMPLETED | OUTPATIENT
Start: 2023-11-29 | End: 2023-11-29

## 2023-11-29 RX ORDER — NAPROXEN SODIUM 220 MG/1
81 TABLET, FILM COATED ORAL DAILY
Status: DISCONTINUED | OUTPATIENT
Start: 2023-11-29 | End: 2023-12-03 | Stop reason: HOSPADM

## 2023-11-29 RX ORDER — ONDANSETRON HYDROCHLORIDE 2 MG/ML
4 INJECTION, SOLUTION INTRAVENOUS EVERY 6 HOURS PRN
Status: DISCONTINUED | OUTPATIENT
Start: 2023-11-29 | End: 2023-12-03 | Stop reason: HOSPADM

## 2023-11-29 RX ORDER — LIDOCAINE 560 MG/1
1 PATCH PERCUTANEOUS; TOPICAL; TRANSDERMAL DAILY
Status: DISCONTINUED | OUTPATIENT
Start: 2023-11-29 | End: 2023-12-03 | Stop reason: HOSPADM

## 2023-11-29 RX ADMIN — ISOSORBIDE DINITRATE 10 MG: 10 TABLET ORAL at 19:56

## 2023-11-29 RX ADMIN — CLOPIDOGREL BISULFATE 75 MG: 75 TABLET, FILM COATED ORAL at 08:55

## 2023-11-29 RX ADMIN — METOPROLOL TARTRATE 25 MG: 25 TABLET, FILM COATED ORAL at 19:57

## 2023-11-29 RX ADMIN — OXYCODONE HYDROCHLORIDE AND ACETAMINOPHEN 1 TABLET: 5; 325 TABLET ORAL at 16:05

## 2023-11-29 RX ADMIN — ASPIRIN 81 MG CHEWABLE TABLET 81 MG: 81 TABLET CHEWABLE at 08:55

## 2023-11-29 RX ADMIN — FENTANYL CITRATE 50 MCG: 50 INJECTION INTRAMUSCULAR; INTRAVENOUS at 02:29

## 2023-11-29 RX ADMIN — HUMAN ALBUMIN MICROSPHERES AND PERFLUTREN 0.5 ML: 10; .22 INJECTION, SOLUTION INTRAVENOUS at 13:41

## 2023-11-29 RX ADMIN — METOPROLOL TARTRATE 25 MG: 25 TABLET, FILM COATED ORAL at 08:55

## 2023-11-29 RX ADMIN — LORAZEPAM 1 MG: 2 INJECTION INTRAMUSCULAR; INTRAVENOUS at 03:22

## 2023-11-29 RX ADMIN — LIDOCAINE 1 PATCH: 4 PATCH TOPICAL at 08:55

## 2023-11-29 RX ADMIN — MORPHINE SULFATE 4 MG: 4 INJECTION, SOLUTION INTRAMUSCULAR; INTRAVENOUS at 01:33

## 2023-11-29 RX ADMIN — ONDANSETRON HYDROCHLORIDE 4 MG: 2 INJECTION, SOLUTION INTRAVENOUS at 01:32

## 2023-11-29 RX ADMIN — HEPARIN SODIUM 4000 UNITS: 5000 INJECTION INTRAVENOUS; SUBCUTANEOUS at 19:51

## 2023-11-29 RX ADMIN — ATORVASTATIN CALCIUM 40 MG: 40 TABLET, FILM COATED ORAL at 20:01

## 2023-11-29 RX ADMIN — POTASSIUM CHLORIDE 40 MEQ: 750 TABLET, FILM COATED, EXTENDED RELEASE ORAL at 10:13

## 2023-11-29 RX ADMIN — ONDANSETRON 4 MG: 2 INJECTION INTRAMUSCULAR; INTRAVENOUS at 01:32

## 2023-11-29 RX ADMIN — HEPARIN SODIUM 1200 UNITS/HR: 10000 INJECTION, SOLUTION INTRAVENOUS at 19:52

## 2023-11-29 RX ADMIN — MORPHINE SULFATE 2 MG: 2 INJECTION, SOLUTION INTRAMUSCULAR; INTRAVENOUS at 06:53

## 2023-11-29 RX ADMIN — METOPROLOL TARTRATE 25 MG: 25 TABLET, FILM COATED ORAL at 13:12

## 2023-11-29 SDOH — SOCIAL STABILITY: SOCIAL INSECURITY: HAVE YOU HAD THOUGHTS OF HARMING ANYONE ELSE?: NO

## 2023-11-29 SDOH — SOCIAL STABILITY: SOCIAL INSECURITY: WERE YOU ABLE TO COMPLETE ALL THE BEHAVIORAL HEALTH SCREENINGS?: YES

## 2023-11-29 SDOH — SOCIAL STABILITY: SOCIAL INSECURITY: ABUSE: ADULT

## 2023-11-29 ASSESSMENT — ENCOUNTER SYMPTOMS
HEMATURIA: 0
PALPITATIONS: 0
VOMITING: 0
PHOTOPHOBIA: 0
CHEST TIGHTNESS: 1
TREMORS: 0
LIGHT-HEADEDNESS: 0
WEAKNESS: 0
FEVER: 0
BLOOD IN STOOL: 0
NAUSEA: 0
DIZZINESS: 0
NUMBNESS: 1
ABDOMINAL PAIN: 0
SHORTNESS OF BREATH: 0
CONFUSION: 0
COUGH: 0
CHILLS: 0
SLEEP DISTURBANCE: 0
POLYDIPSIA: 0
COLOR CHANGE: 0
DYSURIA: 0

## 2023-11-29 ASSESSMENT — COLUMBIA-SUICIDE SEVERITY RATING SCALE - C-SSRS
6. HAVE YOU EVER DONE ANYTHING, STARTED TO DO ANYTHING, OR PREPARED TO DO ANYTHING TO END YOUR LIFE?: NO
2. HAVE YOU ACTUALLY HAD ANY THOUGHTS OF KILLING YOURSELF?: NO
1. IN THE PAST MONTH, HAVE YOU WISHED YOU WERE DEAD OR WISHED YOU COULD GO TO SLEEP AND NOT WAKE UP?: NO

## 2023-11-29 ASSESSMENT — PAIN SCALES - GENERAL
PAINLEVEL_OUTOF10: 10 - WORST POSSIBLE PAIN
PAINLEVEL_OUTOF10: 2
PAINLEVEL_OUTOF10: 10 - WORST POSSIBLE PAIN
PAINLEVEL_OUTOF10: 3

## 2023-11-29 ASSESSMENT — PAIN DESCRIPTION - DESCRIPTORS
DESCRIPTORS: ACHING;PRESSURE
DESCRIPTORS: STABBING

## 2023-11-29 ASSESSMENT — PATIENT HEALTH QUESTIONNAIRE - PHQ9
2. FEELING DOWN, DEPRESSED OR HOPELESS: NOT AT ALL
1. LITTLE INTEREST OR PLEASURE IN DOING THINGS: NOT AT ALL
SUM OF ALL RESPONSES TO PHQ9 QUESTIONS 1 & 2: 0

## 2023-11-29 ASSESSMENT — COGNITIVE AND FUNCTIONAL STATUS - GENERAL
PATIENT BASELINE BEDBOUND: NO
MOBILITY SCORE: 24
DAILY ACTIVITIY SCORE: 24

## 2023-11-29 ASSESSMENT — ACTIVITIES OF DAILY LIVING (ADL)
BATHING: INDEPENDENT
GROOMING: INDEPENDENT
PATIENT'S MEMORY ADEQUATE TO SAFELY COMPLETE DAILY ACTIVITIES?: YES
WALKS IN HOME: INDEPENDENT
JUDGMENT_ADEQUATE_SAFELY_COMPLETE_DAILY_ACTIVITIES: YES
ADEQUATE_TO_COMPLETE_ADL: YES
HEARING - RIGHT EAR: FUNCTIONAL
TOILETING: INDEPENDENT
DRESSING YOURSELF: INDEPENDENT
HEARING - LEFT EAR: FUNCTIONAL
LACK_OF_TRANSPORTATION: NO
FEEDING YOURSELF: INDEPENDENT

## 2023-11-29 ASSESSMENT — LIFESTYLE VARIABLES
HAVE YOU EVER FELT YOU SHOULD CUT DOWN ON YOUR DRINKING: NO
HOW OFTEN DO YOU HAVE A DRINK CONTAINING ALCOHOL: NEVER
EVER HAD A DRINK FIRST THING IN THE MORNING TO STEADY YOUR NERVES TO GET RID OF A HANGOVER: NO
HOW MANY STANDARD DRINKS CONTAINING ALCOHOL DO YOU HAVE ON A TYPICAL DAY: PATIENT DOES NOT DRINK
HAVE PEOPLE ANNOYED YOU BY CRITICIZING YOUR DRINKING: NO
SKIP TO QUESTIONS 9-10: 1
HOW OFTEN DO YOU HAVE 6 OR MORE DRINKS ON ONE OCCASION: NEVER
AUDIT-C TOTAL SCORE: 0
EVER FELT BAD OR GUILTY ABOUT YOUR DRINKING: NO
AUDIT-C TOTAL SCORE: 0
REASON UNABLE TO ASSESS: NO

## 2023-11-29 ASSESSMENT — PAIN - FUNCTIONAL ASSESSMENT
PAIN_FUNCTIONAL_ASSESSMENT: 0-10

## 2023-11-29 ASSESSMENT — PAIN DESCRIPTION - LOCATION: LOCATION: CHEST

## 2023-11-29 NOTE — CONSULTS
"Inpatient consult to Cardiology  Consult performed by: Travis Ferrer MD  Consult ordered by: Almas Aquino DO  Reason for consult: chest pain        History Of Present Illness:    Mandie Arevalo is a 48 y.o. female presenting with chest discomfort.    The patient had recent admission from 11/25- 11/27/23 with NSTEMI and underwent coronary angiogram, which demonstrated nonobstructive CAD, but distal LAD stenosis with appearance of possible SCAD.  LV grade also demonstrated hyperdynamic base with akinetic/dyskinetic apex which may be suggestive of takotsubo / stress-induced cardiomyopathy.  She was treated conservatively with ASA, clodpiogrel, metoprolol, and statin, with plan for vascular medicine referral and cardiac rehab.    The patient was discharged home on 11/27/23 and rested most of the day.  On 11/28/23 she did leave the house to perform errands, including going to the bank, grocery store, and picking up her daughter from school.  She went to bed, and early this morning ~ midnight she developed symptoms similar to those that brought her to the hospital on 11/25 - which included chest discomfort radiating down the arms and to the upper back, with associated numbness of the fingers - no associated nausea.  Upon evaluation in the ED, per ED physician note, the EKG demonstrated \"Sinus rhythm, rate 65 bpm, normal axis, QTc 423 ms, no ST segment elevations or depressions.  Not concerning for STEMI.  Nonspecific T wave changes.\"  The case was reportedly discussed with cardiology, who recommended admission and cardiology consultation.    Upon my history in the ED the patient states her chest pain had significantly improved and her arm pain had resolved.  HSTI in ED today was initially 662, however this was down from 1006 on 11/27/23, and continued to downtrend to 450.  I discussed the case with the patient's cardiologist Dr. Hermosillo, who agreed that with improvement of symptoms and downtrending troponin, we " should observe clinically.    ROS:  The remainder of the review of systems was obtained, as was negative as pertains to the chief complaint.    Last Recorded Vitals:  Vitals:    11/29/23 0833 11/29/23 0847 11/29/23 0902 11/29/23 1125   BP: 113/67 (!) 135/91 124/72 146/86   BP Location:       Pulse: 73 70 66 60   Resp: 13 (!) 27 10 18   Temp:       TempSrc:       SpO2: 98% 98% 100% 98%   Weight:       Height:           Last Labs:  CBC - 11/29/2023:  1:09 AM  11.0 13.2 282    38.1      CMP - 11/29/2023:  1:09 AM  8.9 7.1 13 --- 0.3   2.6 4.3 10 62      PTT - 11/29/2023:  1:11 AM  1.0   11.8 34     Troponin I, High Sensitivity   Date/Time Value Ref Range Status   11/29/2023 02:28  (HH) 0 - 13 ng/L Final     Comment:     Previous result verified on 11/29/2023 0143 on specimen/case [id number] called with component [facility] for procedure Troponin I, High Sensitivity with value 682 ng/L.   11/29/2023 01:09  (HH) 0 - 13 ng/L Final   11/27/2023 02:18 PM 1,006 (HH) 0 - 13 ng/L Final     Comment:     Previous result verified on 11/27/2023 0504 on specimen/case [id number] called with component [facility] for procedure Troponin I, High Sensitivity with value 725 ng/L.     BNP   Date/Time Value Ref Range Status   11/29/2023 01:09 AM 93 0 - 99 pg/mL Final   11/25/2023 09:47 AM 22 0 - 99 pg/mL Final     Hemoglobin A1C   Date/Time Value Ref Range Status   11/26/2023 04:23 AM 5.5 see below % Final     LDL Calculated   Date/Time Value Ref Range Status   11/26/2023 04:23  (H) <=99 mg/dL Final     Comment:                                 Near   Borderline      AGE      Desirable  Optimal    High     High     Very High     0-19 Y     0 - 109     ---    110-129   >/= 130     ----    20-24 Y     0 - 119     ---    120-159   >/= 160     ----      >24 Y     0 -  99   100-129  130-159   160-189     >/=190       VLDL   Date/Time Value Ref Range Status   11/26/2023 04:23 AM 21 0 - 40 mg/dL Final      Last I/O:  No  intake/output data recorded.    Past Cardiology Tests (Last 3 Years):  EKG:  ECG 12 Lead 11/28/2023      ECG 12 Lead 11/28/2023    Echo:  Transthoracic Echo (TTE) Complete 11/27/2023    Ejection Fractions:  EF   Date/Time Value Ref Range Status   11/27/2023 08:00 AM 60       Cath:  No results found for this or any previous visit from the past 1095 days.    Stress Test:  No results found for this or any previous visit from the past 1095 days.    Cardiac Imaging:  No results found for this or any previous visit from the past 1095 days.      Past Medical History:  She has no past medical history on file.    Past Surgical History:  She has a past surgical history that includes Coronary angioplasty (11/25/2023).      Social History:  She reports that she has never smoked. She has never used smokeless tobacco. She reports current alcohol use. She reports that she does not use drugs.    Family History:  No family history on file.     Allergies:  Patient has no known allergies.    Inpatient Medications:  Scheduled medications   Medication Dose Route Frequency    aspirin  81 mg oral Daily    atorvastatin  40 mg oral Nightly    clopidogrel  75 mg oral Daily    lidocaine  1 patch transdermal Daily    metoprolol tartrate  25 mg oral 4x daily     PRN medications   Medication    acetaminophen    ondansetron     Continuous Medications   Medication Dose Last Rate     Outpatient Medications:  Current Outpatient Medications   Medication Instructions    aspirin 81 mg, oral, Daily    atorvastatin (LIPITOR) 40 mg, oral, Nightly    clopidogrel (PLAVIX) 75 mg, oral, Daily    metoprolol tartrate (LOPRESSOR) 25 mg, oral, 2 times daily    triamcinolone (Kenalog) 0.1 % cream APPLY AS NEEDED TWICE DAILY TO FLARES       Physical Exam:  Physical Exam  HENT:      Head: Normocephalic.      Mouth/Throat:      Mouth: Mucous membranes are moist.   Eyes:      Extraocular Movements: Extraocular movements intact.   Cardiovascular:      Rate and Rhythm:  Normal rate.   Pulmonary:      Effort: Pulmonary effort is normal.   Abdominal:      Palpations: Abdomen is soft.   Musculoskeletal:      Cervical back: Neck supple.   Skin:     General: Skin is warm.   Neurological:      General: No focal deficit present.      Mental Status: She is alert.   Psychiatric:         Mood and Affect: Mood normal.            Assessment/Plan   Chest pain, recent NSTEMI with findings suspicious for SCAD:  Recurrent chest/arm pain similar to original presentation, but milder per patient.  HSTI continues to downtrend.  EKG from ED personally reviewed - technically limited due to tracing issues (bold tracing) - demonstrated possible diffuse j-point elevation in anterolateral and inferior leads, although tracing artifact limits accurate evaluation.  With downtrending troponin, doubt STEMI, possibly pericarditis or recurrent takotsubo symptoms.    -rpt EKG ordered  -rpt troponin   -check limited TTE for LV fcn, wall motion, and ? Pericardial effusion  -keep NPO after MN in case of worsening symptoms  -continue ASA, clopidogrel, atorvastatin  -increase metoprolol tartrate to 25mg q6hrs  -add nitrate if continues to have chest pain      Peripheral IV 11/29/23 18 G Left;Proximal;Anterior Forearm (Active)   Site Assessment Clean;Dry;Intact 11/29/23 0111   Line Status Blood return noted;Flushed 11/29/23 0111   Number of days: 0       Code Status:  Full Code    Travis Ferrer MD

## 2023-11-29 NOTE — CARE PLAN
The patient's goals for the shift include      The clinical goals for the shift include no CP    Over the shift, the patient did not make progress toward the following goals resting to relieve pain     Problem: ACS/CP/NSTEMI/STEMI  Goal: Lab values return to normal range  Outcome: Not Progressing

## 2023-11-29 NOTE — PROGRESS NOTES
Mandie Arevalo is a 48 y.o. female admitted for Acute chest pain. Pharmacy reviewed the patient's [id number] medications and allergies for accuracy.    The list below reflects the PTA list prior to pharmacy medication history. A summary a changes to the PTA medication list has been listed below. Please review each medication in order reconciliation for additional clarification and justification.    No Changes, pt recently dsc from here on 11/25/23  Medications added:    Medications modified:    Medications to be removed:    Medications of concern:      Prior to Admission Medications   Prescriptions Last Dose Informant Patient Reported? Taking?   aspirin 81 mg chewable tablet   No No   Sig: Chew 1 tablet (81 mg) once daily. Do not start before November 28, 2023.   atorvastatin (Lipitor) 40 mg tablet   No No   Sig: Take 1 tablet (40 mg) by mouth once daily at bedtime.   clopidogrel (Plavix) 75 mg tablet   No No   Sig: Take 1 tablet (75 mg) by mouth once daily for 365 doses. Do not start before November 28, 2023.   metoprolol tartrate (Lopressor) 25 mg tablet   No No   Sig: Take 1 tablet (25 mg) by mouth 2 times a day.   triamcinolone (Kenalog) 0.1 % cream   Yes No   Sig: APPLY AS NEEDED TWICE DAILY TO FLARES      Facility-Administered Medications: None       Natividad Galindo CPhT

## 2023-11-29 NOTE — H&P
History Of Present Illness  Mandie Arevalo is a 48 y.o.  female female with a past medical history significant for with no significant past medical history.  Patient was recently admitted from 11/25/2023 till 11/27/2023 with concerns for with elevated troponins which was initially at 121 and then peaked at 1334 and has been downtrending since to about 450.  A LHC was performed which showed nonobstructive CAD with LV gram showing hyperdynamic base with akinetic/dyskinetic apex which may be suggestive of stress-induced/Takotsubo's cardiomyopathy as well as a possibly occluded LAD at some point and at that time of angiography the LAD had recanalized as there was mention of wall motion abnormalities.  The distal LAD had diffuse disease which can be seen in patients with scad and may represent type III scad with recommendations to manage conservatively with DAPT as well as statin therapy as well as beta-blocker therapy.  Patient was discharged on 11/27/2023 and was asymptomatic without any concerns with need for close outpatient follow-up with cardiology and cardiac rehab.    Patient states that this morning she developed very similar symptoms as when she had previously presented on 11/25/2023.  She stated that it awoke her from sleep and progressively worsened until presentation and has persisted since presentation with only mild to maybe moderate improvement in her symptoms.  She stated that she had developed sudden onset 10 out of 10 radiating sharp/pressure-like stabbing pain in her upper chest wall radiating into bilateral arms and into her neck as well as upper back.  She had also developed complained of numbness and tingling in her upper extremities as well as into her upper back as well as in her anterior chest.  She stated that the only thing that had changed with regards to the symptoms was she did not have any nausea during the episode prior to presentation today.  Since discharge she denied any  recent sick contacts, chemical/environmental exposures, changes in dietary habits or any recent traumatic events/falls other than noted above.  She denies any fevers, chills, night sweats, vision changes, auditory changes, change in taste/smell, loss of bowel/bladder control, loss consciousness, dizziness, vertigo, syncope, seizure-like activity, palpitations, shortness of breath, coughing, wheezing, congestion, hemoptysis, hematemesis, abdominal pain, nausea, vomiting, diarrhea, constipation, dysuria, hematuria, dyschezia, hematochezia or any lateralizing motor/sensory deficits other than noted above.    ED course:  1.  Vital signs on presentation: Temperature 37.2 °C, heart rate 71, respirations 20, blood pressure of 124/79, pulse ox of 100% on room air  2.  EKG noted sinus rhythm without any acute ST segment changes  3.  Troponin appears to be downtrending from prior to discharge.  On the day of discharge on 11/27/2023 her troponin was at 1006 but has since downtrended to 682 on presentation and now 450  4.  CBC was unremarkable for any leukocytosis/anemia  5.  PT/INR of 11.8/1  6.  Metabolic panel was notable for a glucose of 107, sodium 133, potassium of 3.4 and a magnesium of 1.73  7.  BNP of 93  8.  VBG: pH is 7.48, PCO2 of 32, PO2 of 30, SO2 of 23.8  9.  The ED physician did discuss the case with the on-call cardiologist who recommended admission and follow-up first thing in the morning and to continue her medications as dictated on discharge.    A 12 point ROS was performed with the patient denying any complaints at this time aside from those listed in the HPI above.    Past Medical History  She has no past medical history on file.    Surgical History  She has a past surgical history that includes Coronary angioplasty (11/25/2023).     Social History  She reports that she has never smoked. She has never used smokeless tobacco. She reports current alcohol use. She reports that she does not use  drugs.    Family History  No family history on file.     Allergies  Patient has no known allergies.    Review of Systems   Respiratory:  Positive for chest tightness.    Cardiovascular:  Positive for chest pain.   Neurological:  Positive for numbness.   All other systems reviewed and are negative.       Physical Exam  Constitutional:       General: She is not in acute distress.     Appearance: Normal appearance. She is not ill-appearing or diaphoretic.   HENT:      Head: Normocephalic and atraumatic.      Mouth/Throat:      Mouth: Mucous membranes are moist.      Pharynx: Oropharynx is clear.   Eyes:      Extraocular Movements: Extraocular movements intact.      Conjunctiva/sclera: Conjunctivae normal.      Pupils: Pupils are equal, round, and reactive to light.   Neck:      Vascular: No carotid bruit.   Cardiovascular:      Rate and Rhythm: Normal rate and regular rhythm.      Pulses: Normal pulses.      Heart sounds: No murmur heard.  Pulmonary:      Effort: Pulmonary effort is normal. No respiratory distress.      Breath sounds: Normal breath sounds. No wheezing or rhonchi.   Chest:      Chest wall: No tenderness.   Abdominal:      General: Abdomen is flat. Bowel sounds are normal. There is no distension.      Palpations: Abdomen is soft. There is no mass.      Tenderness: There is no abdominal tenderness. There is no guarding or rebound.   Musculoskeletal:         General: No signs of injury. Normal range of motion.      Cervical back: Normal range of motion and neck supple. No rigidity or tenderness.      Right lower leg: No edema.      Left lower leg: No edema.      Comments: 5 out of 5 strength in bilateral upper and lower extremities, range of motion appears intact, and intact sensation to light touch, no midline neck/back tenderness,   Skin:     General: Skin is warm and dry.      Capillary Refill: Capillary refill takes less than 2 seconds.      Findings: No bruising, erythema, lesion or rash.  "  Neurological:      General: No focal deficit present.      Mental Status: She is alert and oriented to person, place, and time. Mental status is at baseline.      Cranial Nerves: No cranial nerve deficit.      Sensory: No sensory deficit.      Motor: No weakness.      Comments: Finger-nose/heel-to-shin intact   Psychiatric:         Thought Content: Thought content normal.         Judgment: Judgment normal.      Comments: Anxious         SCHEDULED MEDICATIONS  aspirin, 81 mg, oral, Daily  atorvastatin, 40 mg, oral, Nightly  clopidogrel, 75 mg, oral, Daily  lidocaine, 1 patch, transdermal, Daily  metoprolol tartrate, 25 mg, oral, BID           CONTINUOUS MEDICATIONS          PRN MEDICATIONS  PRN medications: acetaminophen, ondansetron      Last Recorded Vitals  Blood pressure 115/68, pulse 68, temperature 37.2 °C (99 °F), temperature source Temporal, resp. rate 15, height 1.6 m (5' 3\"), weight 72.6 kg (160 lb), SpO2 99 %.    Relevant Results    Results for orders placed or performed during the hospital encounter of 11/29/23 (from the past 24 hour(s))   CBC and Auto Differential   Result Value Ref Range    WBC 11.0 4.4 - 11.3 x10*3/uL    nRBC 0.0 0.0 - 0.0 /100 WBCs    RBC 4.31 4.00 - 5.20 x10*6/uL    Hemoglobin 13.2 12.0 - 16.0 g/dL    Hematocrit 38.1 36.0 - 46.0 %    MCV 88 80 - 100 fL    MCH 30.6 26.0 - 34.0 pg    MCHC 34.6 32.0 - 36.0 g/dL    RDW 12.2 11.5 - 14.5 %    Platelets 282 150 - 450 x10*3/uL    Neutrophils % 66.0 40.0 - 80.0 %    Immature Granulocytes %, Automated 0.6 0.0 - 0.9 %    Lymphocytes % 22.2 13.0 - 44.0 %    Monocytes % 8.2 2.0 - 10.0 %    Eosinophils % 2.5 0.0 - 6.0 %    Basophils % 0.5 0.0 - 2.0 %    Neutrophils Absolute 7.26 1.20 - 7.70 x10*3/uL    Immature Granulocytes Absolute, Automated 0.07 0.00 - 0.70 x10*3/uL    Lymphocytes Absolute 2.44 1.20 - 4.80 x10*3/uL    Monocytes Absolute 0.90 0.10 - 1.00 x10*3/uL    Eosinophils Absolute 0.28 0.00 - 0.70 x10*3/uL    Basophils Absolute 0.06 0.00 " - 0.10 x10*3/uL   Comprehensive metabolic panel   Result Value Ref Range    Glucose 107 (H) 74 - 99 mg/dL    Sodium 133 (L) 136 - 145 mmol/L    Potassium 3.4 (L) 3.5 - 5.3 mmol/L    Chloride 102 98 - 107 mmol/L    Bicarbonate 23 21 - 32 mmol/L    Anion Gap 11 10 - 20 mmol/L    Urea Nitrogen 10 6 - 23 mg/dL    Creatinine 0.77 0.50 - 1.05 mg/dL    eGFR >90 >60 mL/min/1.73m*2    Calcium 8.9 8.6 - 10.3 mg/dL    Albumin 4.3 3.4 - 5.0 g/dL    Alkaline Phosphatase 62 33 - 110 U/L    Total Protein 7.1 6.4 - 8.2 g/dL    AST 13 9 - 39 U/L    Bilirubin, Total 0.3 0.0 - 1.2 mg/dL    ALT 10 7 - 45 U/L   Magnesium   Result Value Ref Range    Magnesium 1.73 1.60 - 2.40 mg/dL   Phosphorus   Result Value Ref Range    Phosphorus 2.6 2.5 - 4.9 mg/dL   B-Type Natriuretic Peptide   Result Value Ref Range    BNP 93 0 - 99 pg/mL   Troponin I, High Sensitivity   Result Value Ref Range    Troponin I, High Sensitivity 682 (HH) 0 - 13 ng/L   D-Dimer, Quantitative Non VTE   Result Value Ref Range    D-Dimer Non VTE, Quant (ng/mL FEU) 1,096 (H) <=500 ng/mL FEU   Blood Gas Venous Full Panel   Result Value Ref Range    POCT pH, Venous 7.48 (H) 7.33 - 7.43 pH    POCT pCO2, Venous 32 (L) 41 - 51 mm Hg    POCT pO2, Venous 30 (L) 35 - 45 mm Hg    POCT SO2, Venous 46 45 - 75 %    POCT Oxy Hemoglobin, Venous 45.0 45.0 - 75.0 %    POCT Hematocrit Calculated, Venous 41.0 36.0 - 46.0 %    POCT Sodium, Venous 134 (L) 136 - 145 mmol/L    POCT Potassium, Venous 3.5 3.5 - 5.3 mmol/L    POCT Chloride, Venous 103 98 - 107 mmol/L    POCT Ionized Calicum, Venous 1.13 1.10 - 1.33 mmol/L    POCT Glucose, Venous 113 (H) 74 - 99 mg/dL    POCT Lactate, Venous 1.4 0.4 - 2.0 mmol/L    POCT Base Excess, Venous 0.9 -2.0 - 3.0 mmol/L    POCT HCO3 Calculated, Venous 23.8 22.0 - 26.0 mmol/L    POCT Hemoglobin, Venous 13.5 12.0 - 16.0 g/dL    POCT Anion Gap, Venous 11.0 10.0 - 25.0 mmol/L    Patient Temperature 37.0 degrees Celsius    FiO2 21 %   Coagulation Screen   Result  Value Ref Range    Protime 11.8 9.8 - 12.8 seconds    INR 1.0 0.9 - 1.1    aPTT 34 27 - 38 seconds   Troponin I, High Sensitivity   Result Value Ref Range    Troponin I, High Sensitivity 450 (HH) 0 - 13 ng/L          Transthoracic Echo (TTE) Complete    Result Date: 11/27/2023              [place] [address]      Phone [phone] Fax [phone] TRANSTHORACIC ECHOCARDIOGRAM REPORT  Patient Name:      LOLA Cornelius Physician:    [id number] Bert Hernandez MD Study Date:        11/27/2023           Ordering Provider:    [id number] ARELY RODRIGUEZ MRN/PID:           [id number]             Fellow: Accession#:        [id number]         Nurse: Date of Birth/Age: 1975 / 48 years Sonographer:          Radha Wilcox RDCS Gender:            F                    Additional Staff: Height:            160.02 cm            Admit Date:           11/25/2023 Weight:            83.92 kg             Admission Status:     Inpatient -                                                               Routine BSA:               1.87 m2              Department Location:  [facility] Blood Pressure: 90 /48 mmHg Study Type:    TRANSTHORACIC ECHO (TTE) COMPLETE Diagnosis/ICD: Non ST elevation (NSTEMI) myocardial infarction-I21.4 Indication:    Acute Coronary Syndrome: Non-STEMI CPT Codes:     Echo Complete w Full Doppler-82511  Study Detail: The following Echo studies were performed: 2D, M-Mode, Doppler and               color flow. Optison used as a contrast agent for endocardial               border definition. Total contrast used for this procedure was 3 mL               via IV push.  PHYSICIAN INTERPRETATION: Left Ventricle: Left ventricular systolic function is normal, with an estimated ejection fraction of 55%. There are no regional wall motion abnormalities.  The left ventricular cavity size is normal. The left ventricular septal wall thickness is normal. Spectral Doppler shows a normal pattern of left ventricular diastolic filling. Left Atrium: The left atrium is normal in size. Right Ventricle: The right ventricle is normal in size. There is normal right ventricular global systolic function. Right Atrium: The right atrium is normal in size. Aortic Valve: The aortic valve is probably trileaflet. There is no evidence of aortic valve regurgitation. Mitral Valve: The mitral valve is normal in structure. There is trace to mild mitral valve regurgitation. Tricuspid Valve: The tricuspid valve is structurally normal. There is trace to mild tricuspid regurgitation. The Doppler estimated RVSP is within normal limits at 21.8 mmHg. Pulmonic Valve: The pulmonic valve is structurally normal. There is physiologic pulmonic valve regurgitation. Pericardium: There is no pericardial effusion noted. Aorta: The aortic root is normal. Systemic Veins: The inferior vena cava appears to be of normal size. There is IVC inspiratory collapse greater than 50%.  CONCLUSIONS:  1. Left ventricular systolic function is normal with a 55% estimated ejection fraction.  2. RVSP within normal limits. QUANTITATIVE DATA SUMMARY: 2D MEASUREMENTS:                          Normal Ranges: IVSd:          0.90 cm   (0.6-1.1cm) LVPWd:         1.00 cm   (0.6-1.1cm) LVIDd:         4.50 cm   (3.9-5.9cm) LVIDs:         3.40 cm LV Mass Index: 76.4 g/m2 LV % FS        24.4 % LA VOLUME:                               Normal Ranges: LA Vol A4C:        46.7 ml    (22+/-6mL/m2) LA Vol A2C:        52.3 ml LA Vol BP:         50.3 ml LA Vol Index A4C:  25.0ml/m2 LA Vol Index A2C:  28.0 ml/m2 LA Vol Index BP:   26.9 ml/m2 LA Area A4C:       17.7 cm2 LA Area A2C:       18.4 cm2 LA Major Axis A4C: 5.7 cm LA Major Axis A2C: 5.5 cm LA Volume Index:   25.8 ml/m2 RA VOLUME BY A/L METHOD:                       Normal Ranges: RA Area  A4C: 15.4 cm2 M-MODE MEASUREMENTS:                  Normal Ranges: Ao Root: 2.60 cm (2.0-3.7cm) AoV Exc: 1.90 cm (1.5-2.5cm) LAs:     3.60 cm (2.7-4.0cm) AORTA MEASUREMENTS:                      Normal Ranges: AoV Exc:     1.90 cm (1.5-2.5cm) Ao Sinus, d: 2.70 cm (2.1-3.5cm) Ao STJ, d:   2.50 cm (1.7-3.4cm) Asc Ao, d:   2.80 cm (2.1-3.4cm) Ao Arch:     2.40 cm (2.0-3.6cm) LV SYSTOLIC FUNCTION BY 2D PLANIMETRY (MOD):                     Normal Ranges: EF-A4C View: 63.5 % (>=55%) EF-A2C View: 58.8 % EF-Biplane:  60.3 % LV DIASTOLIC FUNCTION:                           Normal Ranges: MV Peak E:    0.87 m/s    (0.7-1.2 m/s) MV Peak A:    0.66 m/s    (0.42-0.7 m/s) E/A Ratio:    1.32        (1.0-2.2) MV e'         0.12 m/s    (>8.0) MV lateral e' 0.13 m/s MV medial e'  0.10 m/s MV A Dur:     151.00 msec E/e' Ratio:   7.57        (<8.0) LV IVRT:      92 msec     (<100ms) MITRAL VALVE:                 Normal Ranges: MV DT: 201 msec (150-240msec) AORTIC VALVE:                         Normal Ranges: LVOT Max Romario:  0.97 m/s (<=1.1m/s) LVOT VTI:      21.80 cm LVOT Diameter: 1.80 cm  (1.8-2.4cm)  RIGHT VENTRICLE: RV Basal 3.20 cm RV Mid   2.60 cm RV Major 6.7 cm TAPSE:   27.2 mm RV s'    0.12 m/s TRICUSPID VALVE/RVSP:                             Normal Ranges: Peak TR Velocity: 2.17 m/s RV Syst Pressure: 21.8 mmHg (< 30mmHg) IVC Diam:         2.00 cm  [id number] Bert Hernandez MD Electronically signed on 11/27/2023 at 8:03:55 PM  ** Final **     CT angio chest abdomen pelvis    Result Date: 11/25/2023  Interpreted By:  Javier Low, STUDY: CT ANGIO CHEST ABDOMEN PELVIS;  11/25/2023 12:17 pm   INDICATION: Signs/Symptoms:chest pain, arm numbness.   COMPARISON: Portable chest earlier same day 25 November 2023 at 1008 hours   ACCESSION NUMBER(S): [id number]   ORDERING CLINICIAN: WES MORROW   TECHNIQUE: CTA of the chest, abdomen and pelvis included CT chest, abdomen pelvis from the thoracic inlet through the symphysis pubis before  and in the arterial phase after the uneventful administration of intravenous contrast (100 mL Omnipaque 350)   Three dimensional maximum intensity projection (3-D MIPs) image/s were created on a separate dedicated workstation, reviewed and saved   FINDINGS: AORTA:   Acute intramural hematoma: Negative Penetrating ulcer: Negative Dissection: Negative Thoracic aortic aneurysm: Negative Abdominal aortic aneurysm: Negative   MAJOR AORTIC BRANCH VESSELS:   Brachiocephalic arteries: No significant focal stenosis Celiac and branches: No significant focal stenosis SMA and first order branches: No significant focal stenosis Renal arteries: No significant focal stenosis LEONOR: No significant focal stenosis Iliac arteries: No significant focal stenosis Other: n/a   OTHER CARDIOVASCULAR: Heart size: Within normal limits   Acute pulmonary embolism: Negative through the lobar (second order) branch level. Segmental and more distal branches not well enough opacified to evaluate. Pulmonary arteries ectasia: Negative   Heart failure change: Negative.  No sign of interstitial or alveolar edema.   Other: n/a   OTHER CHEST:   NONVASCULAR MEDIASTINUM: Esophagus: Grossly normal by CT Mediastinal Mass: Negative Hiatal hernia: None   LUNGS / AIRSPACES / AIRWAYS:   LARGE AIRWAYS Filling defect: Negative Wall thickening: Negative Bronchiectasis: Negative Other: N/A   AIRSPACES Fibrosis: Negative Emphysema: Negative Consolidation: Negative Ground glass airspace disease: Negative Edema: Negative Nodule / Mass: Negative Other: Minimal dependent atelectasis   PLEURA: No pleural effusion or pneumothorax on either side   LYMPH NODES: No thoracic adenopathy   THORACIC SKELETON: No acute findings   CHEST WALL: I suspect small cysts in both breasts. Most recent dedicated breast imaging are the screening mammograms from August, 2021 at which time at least some of these cysts were present but unchanged from their respective priors. More recent breast  imaging should be considered unless this patient has up-to-date breast imaging at some outside institution   -------   CT ABDOMEN AND PELVIS:   LIVER: Normal. No enlargement or evidence of cirrhosis or fatty change. No mass or other suspect lesion.   SPLEEN: Normal. No enlargement, mass or evidence of splenic vein thrombosis.   PANCREAS: Normal. No CT evidence of acute or chronic pancreatitis. No duct dilation. No mass.   GALLBLADDER: Normal CT appearance. No dilation, calcified, or gas-containing stones.  Other types of gallstones could be occult on CT and detectable only by ultrasound.   BILE DUCTS: Normal. No biliary duct dilation.   ADRENAL GLANDS: Small right lipid rich adenoma does not need follow-up. Left side normal. No acute findings or findings needing follow-up on either side   KIDNEYS AND URETERS: Normal.  No hydronephrosis on either side.  No mass.  Symmetric enhancement.  No infarct or CT evidence of acute pyelonephritis.  No substantial radiodense stone.  Tiny stones and radiolucent stones could be occult on CT.   LYMPH NODES: No adenopathy, intraperitoneal, retroperitoneal, pelvic or otherwise   APPENDIX: Normal.  Not dilated, thick walled or in any other way inflamed in appearance.  No inflammatory change about the appendix.   COLON: Normal. No sign of acute diverticulitis or other colitis. No annular constricting mass.   SMALL BOWEL: Normal. No small bowel dilation or any other sign of small bowel obstruction. No sign of active inflammatory bowel disease.   STOMACH / DUODENUM: Grossly normal by CT which has limited sensitivity and specificity for the stomach and duodenum.   RETROPERITONEUM: Normal.  No acute hemorrhage or inflammatory change. Lymph nodes in a separate dedicated section.   OMENTUM, MESENTERY AND PERITONEAL SPACES: Free intraperitoneal air: Negative Free intraperitoneal fluid: Negative Abscess: Negative Other: n/a   URINARY BLADDER: Normal. No wall thickening, large diverticula,  radiodense stone or surrounding inflammatory change.   PELVIS: The uterus is present. Probability of a lower uterine body or even cervical fibroid anteriorly. One substantial sized cyst in each adnexa, each about 3 cm diameter. On the left, there is a delineated attenuation difference with more simple appearing fluid layering on top of what is probably hemorrhagic fluid   ABDOMINAL WALL: Hernia: Negative Other: No acute or contributory abnormality.   ABDOMINAL / PELVIC SKELETON: Grade 1 anterolisthesis of L5 on S1 with associated chronic bilateral L5 pars defects. No acute findings       NO ACUTE AORTIC HEMATOMA, PENETRATING ULCER, ANEURYSM, AORTIC DISSECTION OR ANY OTHER ACUTE FINDINGS IN THE CHEST, ABDOMEN OR PELVIS   CTA OF THE BRACHIOCEPHALIC VESSELS LIKEWISE UNREMARKABLE, WITHOUT DISSECTION OR HIGH-GRADE STENOSIS   NO ACUTE PULMONARY EMBOLISM THROUGH THE SEGMENTAL BRANCH LEVEL (THIS EXAM HAS ESSENTIALLY EQUIVALENT SENSITIVITY/SPECIFICITY FOR ACUTE PULMONARY EMBOLISM EVALUATION AS A DEDICATED CT CHEST FOR PULMONARY EMBOLISM EVALUATION AND IS NEGATIVE)   NO ACUTE AIRSPACE DISEASE SUCH AS PNEUMONIA   NO PLEURAL OR PERICARDIAL EFFUSION   NO PNEUMOTHORAX   NO ACUTE FINDINGS IN THE ABDOMEN OR PELVIS; HOWEVER, NONEMERGENT BUT NEAR TERM PELVIC ULTRASOUND RECOMMENDED TO EVALUATE THE ADNEXA. ONE PROMINENT CYST ON EACH SIDE, WITH PROBABILITY OF LAYERING HEMORRHAGIC DEBRIS IN THE LEFT OVARIAN/ADNEXAL CYST   MACRO: None   Signed by: Javier Low 11/25/2023 12:49 PM Dictation workstation:   [id number]    XR chest 1 view    Result Date: 11/25/2023  STUDY: Chest Radiograph;  11/25/2023 10:14 AM. INDICATION: Chest pain. COMPARISON: None Available. ACCESSION NUMBER(S): [id number] ORDERING CLINICIAN: WES MORROW TECHNIQUE:  Frontal chest was obtained at 10:12 hours. FINDINGS: CARDIOMEDIASTINAL SILHOUETTE: Cardiomediastinal silhouette is normal in size and configuration.  LUNGS: Lungs are clear.  ABDOMEN: No remarkable upper  abdominal findings.  BONES: No acute osseous changes.    No acute cardiopulmonary abnormality. Signed by Elliot Marshall MD          Assessment/Plan     Recurrent chest pain/CAD/Takotsubo's cardiomyopathy/NSTEMI  -Recent admission as described above  -Cardiology consult appreciated  -Continued on metoprolol/Lipitor/aspirin/Plavix  -Telemetry monitoring  -Informed by ED physician about their discussion with cardiology who recommended admission with conservative management at this time and as needed analgesics as tolerated    2.  HLD  -Continued on Lipitor    3.  Elevated troponin level  -Likely in setting of above  -Has continued to trend down which it had peaked at about 1300 about 3 days ago and is now downgraded about 400s  -Cardiology consult appreciated    Almas Aquino DO

## 2023-11-29 NOTE — Clinical Note
Sheath was removed in the right femoral artery. Site closed by manual compression. By Darrell GONZALES

## 2023-11-29 NOTE — ED PROVIDER NOTES
"  External Records Reviewed: Discharge summary from 11/27/2023, op note from 11/25/2023      HPI  Mandie Arevalo is a 48 y.o. female who is presenting with chest pain.  Patient states she was seen in the emergency department and in the hospital 2 days ago.  She notes she had elevated troponins and she had a catheterization and the doctors were concerned about scad.  Patient states she was discharged on some medications and is supposed to follow-up next week.  Patient notes she was at home tonight when she woke up from her sleep with 10 of 10 chest pain.  Patient states the pain is substernal and sharp.  Patient states it is similar to the pain she had 2 days ago.  She does endorse some shortness of breath with this.  She denies any lightheadedness, abdominal pain, nausea or vomiting.    PMH  No past medical history on file.    Meds  Current Outpatient Medications   Medication Instructions    aspirin 81 mg, oral, Daily    atorvastatin (LIPITOR) 40 mg, oral, Nightly    clopidogrel (PLAVIX) 75 mg, oral, Daily    metoprolol tartrate (LOPRESSOR) 25 mg, oral, 2 times daily    triamcinolone (Kenalog) 0.1 % cream APPLY AS NEEDED TWICE DAILY TO FLARES       Allergies  No Known Allergies     SHx  Social History     Tobacco Use    Smoking status: Never    Smokeless tobacco: Never   Substance Use Topics    Alcohol use: Yes     Comment: occasional    Drug use: Never       ------------------------------------------------------------------------------------------------------------------------------------------    /79 (BP Location: Right arm)   Pulse 71   Temp 37.2 °C (99 °F) (Temporal)   Resp 20   Ht 1.6 m (5' 3\")   Wt 72.6 kg (160 lb)   SpO2 100%   BMI 28.34 kg/m²     Physical Exam  Constitutional:       General: She is not in acute distress.  HENT:      Head: Normocephalic and atraumatic.      Mouth/Throat:      Mouth: Mucous membranes are moist.   Eyes:      Extraocular Movements: Extraocular movements intact. "      Conjunctiva/sclera: Conjunctivae normal.      Pupils: Pupils are equal, round, and reactive to light.   Cardiovascular:      Rate and Rhythm: Normal rate and regular rhythm.      Heart sounds: No murmur heard.     No gallop.   Pulmonary:      Effort: Pulmonary effort is normal. No respiratory distress.      Breath sounds: Normal breath sounds. No wheezing.   Abdominal:      General: There is no distension.      Palpations: Abdomen is soft.      Tenderness: There is no abdominal tenderness. There is no guarding.   Musculoskeletal:         General: No swelling or signs of injury. Normal range of motion.   Skin:     General: Skin is warm and dry.      Findings: No lesion or rash.   Neurological:      General: No focal deficit present.      Mental Status: She is alert and oriented to person, place, and time. Mental status is at baseline.   Psychiatric:         Mood and Affect: Mood is anxious.         Judgment: Judgment normal.          ------------------------------------------------------------------------------------------------------------------------------------------    Medical Decision Making: Patient is a 48-year-old female who is presenting with chest pain.  Patient is hemodynamically stable and nontoxic-appearing on arrival.  She is endorsing substernal chest pain and does appear very anxious.  EKG was immediately ordered which was reassuring without ST segment elevations or depressions.  She does have some mild T wave inversions that are new.  Per chart review, patient was just admitted and had a cardiac cath that was concerning for possible Takotsubo cardiomyopathy versus scad.  Patient was discharged on new medications.  Patient's return of chest pain is concerning for possible coronary artery dissection given the concern for scad.  However patient is overall well-appearing and hemodynamically stable.  Lab work was ordered.  Dr. Herrera with interventional cardiology was consulted Who recommended  trending troponins and noted no plans for cath unless patient is to become unstable and recommended talking with cardiology.  Patient's troponin is elevated to 600 however this is improved from a few days ago when it was at thousand.  Patient's sodium and potassium was mildly decreased and D-dimer was elevated.  Delta troponin improved to 458 which is reassuring.  Dr. Jo with cardiology was consulted who recommended admitting the patient for observation and further cardiac evaluation.  Findings and plan were discussed with the patient patient remained stable.  Patient was admitted for further treatment and management.    Independent EKG interpretation:  Sinus rhythm, rate 65 bpm, normal axis, QTc 423 ms, no ST segment elevations or depressions.  Not concerning for STEMI.  Nonspecific T wave changes.    Diagnoses as of 11/29/23 0672   Acute chest pain       Discussed with: Admitting team    Geeta Issa MD  Emergency Medicine       Geeta Issa MD  11/29/23 [id number]

## 2023-11-30 ENCOUNTER — APPOINTMENT (OUTPATIENT)
Dept: CARDIOLOGY | Facility: HOSPITAL | Age: 48
DRG: 281 | End: 2023-11-30
Payer: COMMERCIAL

## 2023-11-30 PROBLEM — I25.42 SPONTANEOUS DISSECTION OF CORONARY ARTERY: Status: ACTIVE | Noted: 2023-11-29

## 2023-11-30 LAB
ANION GAP SERPL CALC-SCNC: 11 MMOL/L (ref 10–20)
ATRIAL RATE: 68 BPM
ATRIAL RATE: 74 BPM
ATRIAL RATE: 79 BPM
ATRIAL RATE: 80 BPM
BUN SERPL-MCNC: 8 MG/DL (ref 6–23)
CALCIUM SERPL-MCNC: 8.5 MG/DL (ref 8.6–10.3)
CARDIAC TROPONIN I PNL SERPL HS: 8570 NG/L (ref 0–13)
CHLORIDE SERPL-SCNC: 103 MMOL/L (ref 98–107)
CO2 SERPL-SCNC: 23 MMOL/L (ref 21–32)
CREAT SERPL-MCNC: 0.76 MG/DL (ref 0.5–1.05)
ERYTHROCYTE [DISTWIDTH] IN BLOOD BY AUTOMATED COUNT: 12.4 % (ref 11.5–14.5)
GFR SERPL CREATININE-BSD FRML MDRD: >90 ML/MIN/1.73M*2
GLUCOSE SERPL-MCNC: 113 MG/DL (ref 74–99)
HCT VFR BLD AUTO: 35 % (ref 36–46)
HGB BLD-MCNC: 12.1 G/DL (ref 12–16)
MAGNESIUM SERPL-MCNC: 1.71 MG/DL (ref 1.6–2.4)
MCH RBC QN AUTO: 30.8 PG (ref 26–34)
MCHC RBC AUTO-ENTMCNC: 34.6 G/DL (ref 32–36)
MCV RBC AUTO: 89 FL (ref 80–100)
NRBC BLD-RTO: 0 /100 WBCS (ref 0–0)
P AXIS: 28 DEGREES
P AXIS: 32 DEGREES
P AXIS: 37 DEGREES
P AXIS: 37 DEGREES
P OFFSET: 188 MS
P OFFSET: 192 MS
P OFFSET: 192 MS
P OFFSET: 199 MS
P ONSET: 137 MS
P ONSET: 141 MS
P ONSET: 142 MS
P ONSET: 143 MS
PLATELET # BLD AUTO: 261 X10*3/UL (ref 150–450)
POTASSIUM SERPL-SCNC: 4.2 MMOL/L (ref 3.5–5.3)
PR INTERVAL: 158 MS
PR INTERVAL: 160 MS
PR INTERVAL: 162 MS
PR INTERVAL: 170 MS
Q ONSET: 221 MS
Q ONSET: 222 MS
Q ONSET: 222 MS
Q ONSET: 223 MS
QRS COUNT: 12 BEATS
QRS COUNT: 12 BEATS
QRS COUNT: 13 BEATS
QRS COUNT: 13 BEATS
QRS DURATION: 72 MS
QRS DURATION: 74 MS
QRS DURATION: 76 MS
QRS DURATION: 78 MS
QT INTERVAL: 360 MS
QT INTERVAL: 374 MS
QT INTERVAL: 378 MS
QT INTERVAL: 396 MS
QTC CALCULATION(BAZETT): 415 MS
QTC CALCULATION(BAZETT): 419 MS
QTC CALCULATION(BAZETT): 421 MS
QTC CALCULATION(BAZETT): 428 MS
QTC FREDERICIA: 396 MS
QTC FREDERICIA: 405 MS
QTC FREDERICIA: 410 MS
QTC FREDERICIA: 413 MS
R AXIS: 46 DEGREES
R AXIS: 47 DEGREES
R AXIS: 49 DEGREES
R AXIS: 67 DEGREES
RBC # BLD AUTO: 3.93 X10*6/UL (ref 4–5.2)
SODIUM SERPL-SCNC: 133 MMOL/L (ref 136–145)
T AXIS: 39 DEGREES
T AXIS: 50 DEGREES
T AXIS: 50 DEGREES
T AXIS: 55 DEGREES
T OFFSET: 402 MS
T OFFSET: 408 MS
T OFFSET: 412 MS
T OFFSET: 420 MS
UFH PPP CHRO-ACNC: 0.6 IU/ML
UFH PPP CHRO-ACNC: 0.6 IU/ML
VENTRICULAR RATE: 68 BPM
VENTRICULAR RATE: 74 BPM
VENTRICULAR RATE: 79 BPM
VENTRICULAR RATE: 80 BPM
WBC # BLD AUTO: 13.6 X10*3/UL (ref 4.4–11.3)

## 2023-11-30 PROCEDURE — 36415 COLL VENOUS BLD VENIPUNCTURE: CPT | Performed by: INTERNAL MEDICINE

## 2023-11-30 PROCEDURE — 93454 CORONARY ARTERY ANGIO S&I: CPT | Performed by: STUDENT IN AN ORGANIZED HEALTH CARE EDUCATION/TRAINING PROGRAM

## 2023-11-30 PROCEDURE — C1894 INTRO/SHEATH, NON-LASER: HCPCS | Performed by: STUDENT IN AN ORGANIZED HEALTH CARE EDUCATION/TRAINING PROGRAM

## 2023-11-30 PROCEDURE — 2500000004 HC RX 250 GENERAL PHARMACY W/ HCPCS (ALT 636 FOR OP/ED): Performed by: NURSE PRACTITIONER

## 2023-11-30 PROCEDURE — 2720000007 HC OR 272 NO HCPCS: Performed by: STUDENT IN AN ORGANIZED HEALTH CARE EDUCATION/TRAINING PROGRAM

## 2023-11-30 PROCEDURE — 99152 MOD SED SAME PHYS/QHP 5/>YRS: CPT | Performed by: STUDENT IN AN ORGANIZED HEALTH CARE EDUCATION/TRAINING PROGRAM

## 2023-11-30 PROCEDURE — 85347 COAGULATION TIME ACTIVATED: CPT

## 2023-11-30 PROCEDURE — 93005 ELECTROCARDIOGRAM TRACING: CPT

## 2023-11-30 PROCEDURE — 99233 SBSQ HOSP IP/OBS HIGH 50: CPT | Performed by: INTERNAL MEDICINE

## 2023-11-30 PROCEDURE — 7100000009 HC PHASE TWO TIME - INITIAL BASE CHARGE: Performed by: STUDENT IN AN ORGANIZED HEALTH CARE EDUCATION/TRAINING PROGRAM

## 2023-11-30 PROCEDURE — 7100000010 HC PHASE TWO TIME - EACH INCREMENTAL 1 MINUTE: Performed by: STUDENT IN AN ORGANIZED HEALTH CARE EDUCATION/TRAINING PROGRAM

## 2023-11-30 PROCEDURE — 2500000001 HC RX 250 WO HCPCS SELF ADMINISTERED DRUGS (ALT 637 FOR MEDICARE OP): Performed by: NURSE PRACTITIONER

## 2023-11-30 PROCEDURE — 2500000001 HC RX 250 WO HCPCS SELF ADMINISTERED DRUGS (ALT 637 FOR MEDICARE OP): Performed by: INTERNAL MEDICINE

## 2023-11-30 PROCEDURE — B2151ZZ FLUOROSCOPY OF LEFT HEART USING LOW OSMOLAR CONTRAST: ICD-10-PCS | Performed by: STUDENT IN AN ORGANIZED HEALTH CARE EDUCATION/TRAINING PROGRAM

## 2023-11-30 PROCEDURE — 93010 ELECTROCARDIOGRAM REPORT: CPT | Performed by: INTERNAL MEDICINE

## 2023-11-30 PROCEDURE — 4A023N7 MEASUREMENT OF CARDIAC SAMPLING AND PRESSURE, LEFT HEART, PERCUTANEOUS APPROACH: ICD-10-PCS | Performed by: STUDENT IN AN ORGANIZED HEALTH CARE EDUCATION/TRAINING PROGRAM

## 2023-11-30 PROCEDURE — 99291 CRITICAL CARE FIRST HOUR: CPT | Performed by: STUDENT IN AN ORGANIZED HEALTH CARE EDUCATION/TRAINING PROGRAM

## 2023-11-30 PROCEDURE — 2500000005 HC RX 250 GENERAL PHARMACY W/O HCPCS: Performed by: STUDENT IN AN ORGANIZED HEALTH CARE EDUCATION/TRAINING PROGRAM

## 2023-11-30 PROCEDURE — 85027 COMPLETE CBC AUTOMATED: CPT | Performed by: INTERNAL MEDICINE

## 2023-11-30 PROCEDURE — 2500000004 HC RX 250 GENERAL PHARMACY W/ HCPCS (ALT 636 FOR OP/ED): Performed by: STUDENT IN AN ORGANIZED HEALTH CARE EDUCATION/TRAINING PROGRAM

## 2023-11-30 PROCEDURE — 2020000001 HC ICU ROOM DAILY

## 2023-11-30 PROCEDURE — 2550000001 HC RX 255 CONTRASTS: Performed by: STUDENT IN AN ORGANIZED HEALTH CARE EDUCATION/TRAINING PROGRAM

## 2023-11-30 PROCEDURE — 80048 BASIC METABOLIC PNL TOTAL CA: CPT | Performed by: INTERNAL MEDICINE

## 2023-11-30 PROCEDURE — 85520 HEPARIN ASSAY: CPT | Performed by: INTERNAL MEDICINE

## 2023-11-30 PROCEDURE — 84484 ASSAY OF TROPONIN QUANT: CPT | Performed by: NURSE PRACTITIONER

## 2023-11-30 PROCEDURE — 83735 ASSAY OF MAGNESIUM: CPT | Performed by: INTERNAL MEDICINE

## 2023-11-30 PROCEDURE — 99153 MOD SED SAME PHYS/QHP EA: CPT | Performed by: STUDENT IN AN ORGANIZED HEALTH CARE EDUCATION/TRAINING PROGRAM

## 2023-11-30 PROCEDURE — B2111ZZ FLUOROSCOPY OF MULTIPLE CORONARY ARTERIES USING LOW OSMOLAR CONTRAST: ICD-10-PCS | Performed by: STUDENT IN AN ORGANIZED HEALTH CARE EDUCATION/TRAINING PROGRAM

## 2023-11-30 RX ORDER — METOPROLOL TARTRATE 50 MG/1
50 TABLET ORAL EVERY 6 HOURS
Status: DISCONTINUED | OUTPATIENT
Start: 2023-11-30 | End: 2023-11-30

## 2023-11-30 RX ORDER — NITROGLYCERIN 40 MG/100ML
INJECTION INTRAVENOUS AS NEEDED
Status: DISCONTINUED | OUTPATIENT
Start: 2023-11-30 | End: 2023-11-30 | Stop reason: HOSPADM

## 2023-11-30 RX ORDER — METOPROLOL TARTRATE 25 MG/1
37.5 TABLET, FILM COATED ORAL EVERY 6 HOURS
Status: DISCONTINUED | OUTPATIENT
Start: 2023-11-30 | End: 2023-12-01

## 2023-11-30 RX ORDER — METOPROLOL TARTRATE 25 MG/1
25 TABLET, FILM COATED ORAL EVERY 6 HOURS
Status: DISCONTINUED | OUTPATIENT
Start: 2023-11-30 | End: 2023-11-30

## 2023-11-30 RX ORDER — SODIUM CHLORIDE 9 MG/ML
1000 INJECTION, SOLUTION INTRAVENOUS ONCE AS NEEDED
Status: DISCONTINUED | OUTPATIENT
Start: 2023-11-30 | End: 2023-12-03 | Stop reason: HOSPADM

## 2023-11-30 RX ORDER — SODIUM CHLORIDE 9 MG/ML
INJECTION, SOLUTION INTRAVENOUS CONTINUOUS PRN
Status: COMPLETED | OUTPATIENT
Start: 2023-11-30 | End: 2023-11-30

## 2023-11-30 RX ORDER — SODIUM CHLORIDE 9 MG/ML
1 INJECTION, SOLUTION INTRAVENOUS CONTINUOUS
Status: ACTIVE | OUTPATIENT
Start: 2023-11-30 | End: 2023-11-30

## 2023-11-30 RX ORDER — MIDAZOLAM HYDROCHLORIDE 1 MG/ML
INJECTION INTRAMUSCULAR; INTRAVENOUS AS NEEDED
Status: DISCONTINUED | OUTPATIENT
Start: 2023-11-30 | End: 2023-11-30 | Stop reason: HOSPADM

## 2023-11-30 RX ORDER — FENTANYL CITRATE 50 UG/ML
INJECTION, SOLUTION INTRAMUSCULAR; INTRAVENOUS AS NEEDED
Status: DISCONTINUED | OUTPATIENT
Start: 2023-11-30 | End: 2023-11-30 | Stop reason: HOSPADM

## 2023-11-30 RX ORDER — LIDOCAINE HYDROCHLORIDE 20 MG/ML
INJECTION, SOLUTION INFILTRATION; PERINEURAL AS NEEDED
Status: DISCONTINUED | OUTPATIENT
Start: 2023-11-30 | End: 2023-11-30 | Stop reason: HOSPADM

## 2023-11-30 RX ADMIN — ATORVASTATIN CALCIUM 40 MG: 40 TABLET, FILM COATED ORAL at 20:44

## 2023-11-30 RX ADMIN — METOPROLOL TARTRATE 25 MG: 25 TABLET, FILM COATED ORAL at 06:15

## 2023-11-30 RX ADMIN — ACETAMINOPHEN 650 MG: 325 TABLET ORAL at 16:40

## 2023-11-30 RX ADMIN — ONDANSETRON 4 MG: 2 INJECTION INTRAMUSCULAR; INTRAVENOUS at 16:41

## 2023-11-30 RX ADMIN — ISOSORBIDE DINITRATE 10 MG: 10 TABLET ORAL at 20:44

## 2023-11-30 RX ADMIN — ACETAMINOPHEN 650 MG: 325 TABLET ORAL at 00:55

## 2023-11-30 RX ADMIN — ACETAMINOPHEN 650 MG: 325 TABLET ORAL at 20:44

## 2023-11-30 RX ADMIN — CLOPIDOGREL BISULFATE 75 MG: 75 TABLET, FILM COATED ORAL at 08:26

## 2023-11-30 RX ADMIN — ISOSORBIDE DINITRATE 10 MG: 10 TABLET ORAL at 15:59

## 2023-11-30 RX ADMIN — ASPIRIN 81 MG CHEWABLE TABLET 81 MG: 81 TABLET CHEWABLE at 08:26

## 2023-11-30 ASSESSMENT — COGNITIVE AND FUNCTIONAL STATUS - GENERAL
MOBILITY SCORE: 24
MOBILITY SCORE: 24
DAILY ACTIVITIY SCORE: 24
DAILY ACTIVITIY SCORE: 24

## 2023-11-30 ASSESSMENT — ENCOUNTER SYMPTOMS
NAUSEA: 0
DIZZINESS: 0
HEMATURIA: 0
SHORTNESS OF BREATH: 0
ABDOMINAL PAIN: 0
DYSURIA: 0
SLEEP DISTURBANCE: 0
WEAKNESS: 0
PHOTOPHOBIA: 0
VOMITING: 0
COUGH: 0
BLOOD IN STOOL: 0
FEVER: 0
TREMORS: 0
CHILLS: 0
COLOR CHANGE: 0
POLYDIPSIA: 0
LIGHT-HEADEDNESS: 0
PALPITATIONS: 0
CONFUSION: 0

## 2023-11-30 ASSESSMENT — PAIN SCALES - GENERAL
PAINLEVEL_OUTOF10: 3
PAINLEVEL_OUTOF10: 2
PAINLEVEL_OUTOF10: 0 - NO PAIN
PAINLEVEL_OUTOF10: 0 - NO PAIN

## 2023-11-30 ASSESSMENT — COLUMBIA-SUICIDE SEVERITY RATING SCALE - C-SSRS
6. HAVE YOU EVER DONE ANYTHING, STARTED TO DO ANYTHING, OR PREPARED TO DO ANYTHING TO END YOUR LIFE?: NO
1. IN THE PAST MONTH, HAVE YOU WISHED YOU WERE DEAD OR WISHED YOU COULD GO TO SLEEP AND NOT WAKE UP?: NO
2. HAVE YOU ACTUALLY HAD ANY THOUGHTS OF KILLING YOURSELF?: NO

## 2023-11-30 ASSESSMENT — ACTIVITIES OF DAILY LIVING (ADL): LACK_OF_TRANSPORTATION: NO

## 2023-11-30 ASSESSMENT — PAIN - FUNCTIONAL ASSESSMENT
PAIN_FUNCTIONAL_ASSESSMENT: 0-10

## 2023-11-30 NOTE — PROGRESS NOTES
Mandie Arevalo is a 48 y.o. female on day 0 of admission presenting with Acute chest pain.    Review of Systems   Constitutional:  Negative for chills and fever.   Eyes:  Negative for photophobia and visual disturbance.   Respiratory:  Negative for cough and shortness of breath.    Cardiovascular:  Negative for chest pain, palpitations and leg swelling.   Gastrointestinal:  Negative for abdominal pain, blood in stool, nausea and vomiting.   Endocrine: Negative for polydipsia and polyuria.   Genitourinary:  Negative for decreased urine volume, dysuria, hematuria and urgency.   Skin:  Negative for color change and rash.   Neurological:  Negative for dizziness, tremors, syncope, weakness and light-headedness.   Psychiatric/Behavioral:  Negative for confusion and sleep disturbance.    All other systems reviewed and are negative.     Subjective   Mandie Arevalo is a 48 y.o.  female female with a past medical history significant for with no significant past medical history.  Patient was recently admitted from 11/25/2023 till 11/27/2023 with concerns for with elevated troponins which was initially at 121 and then peaked at 1334 and has been downtrending since to about 450.  A LHC was performed which showed nonobstructive CAD with LV gram showing hyperdynamic base with akinetic/dyskinetic apex which may be suggestive of stress-induced/Takotsubo's cardiomyopathy as well as a possibly occluded LAD at some point and at that time of angiography the LAD had recanalized as there was mention of wall motion abnormalities.  The distal LAD had diffuse disease which can be seen in patients with scad and may represent type III scad with recommendations to manage conservatively with DAPT as well as statin therapy as well as beta-blocker therapy.  Patient was discharged on 11/27/2023 and was asymptomatic without any concerns with need for close outpatient follow-up with cardiology and cardiac rehab.     Patient states  that this morning she developed very similar symptoms as when she had previously presented on 11/25/2023.  She stated that it awoke her from sleep and progressively worsened until presentation and has persisted since presentation with only mild to maybe moderate improvement in her symptoms.  She stated that she had developed sudden onset 10 out of 10 radiating sharp/pressure-like stabbing pain in her upper chest wall radiating into bilateral arms and into her neck as well as upper back.  She had also developed complained of numbness and tingling in her upper extremities as well as into her upper back as well as in her anterior chest.  She stated that the only thing that had changed with regards to the symptoms was she did not have any nausea during the episode prior to presentation today.  Since discharge she denied any recent sick contacts, chemical/environmental exposures, changes in dietary habits or any recent traumatic events/falls other than noted above.  She denies any fevers, chills, night sweats, vision changes, auditory changes, change in taste/smell, loss of bowel/bladder control, loss consciousness, dizziness, vertigo, syncope, seizure-like activity, palpitations, shortness of breath, coughing, wheezing, congestion, hemoptysis, hematemesis, abdominal pain, nausea, vomiting, diarrhea, constipation, dysuria, hematuria, dyschezia, hematochezia or any lateralizing motor/sensory deficits other than noted above.    11/29: Patient seen.  Troponin has increased again.  Patient was complaining of chest pain earlier but better with repeat troponin to 60 Percocet.  With rising troponin 323 spoke with cardiology.  Recommend starting on heparin and Isordil.  Will arrange for cardiac catheterization tomorrow.  Patient with known Takotsubo.  Suspect medical management.  Possibly coronary vasospasm.  Currently chest pain-free.       Objective     Last Recorded Vitals  /76   Pulse 75   Temp 37.3 °C (99.2 °F)    Resp 16   Wt 72.6 kg (160 lb)   SpO2 94%   Intake/Output last 3 Shifts:  No intake or output data in the 24 hours ending 11/29/23 1938    Admission Weight  Weight: 72.6 kg (160 lb) (11/29/23 0059)    Daily Weight  11/29/23 : 72.6 kg (160 lb)      Physical Exam  Constitutional:       Appearance: Normal appearance. She is normal weight.   HENT:      Head: Normocephalic and atraumatic.      Nose: Nose normal. No congestion or rhinorrhea.      Mouth/Throat:      Mouth: Mucous membranes are dry.      Pharynx: Oropharynx is clear.   Eyes:      General: No scleral icterus.     Extraocular Movements: Extraocular movements intact.      Conjunctiva/sclera: Conjunctivae normal.      Pupils: Pupils are equal, round, and reactive to light.   Cardiovascular:      Rate and Rhythm: Normal rate and regular rhythm.      Pulses: Normal pulses.      Heart sounds: No murmur heard.     No gallop.   Pulmonary:      Effort: Pulmonary effort is normal.      Breath sounds: Normal breath sounds. No wheezing, rhonchi or rales.   Chest:      Chest wall: No tenderness.   Abdominal:      General: Abdomen is flat. There is no distension.      Palpations: Abdomen is soft.      Tenderness: There is no abdominal tenderness. There is no guarding or rebound.   Musculoskeletal:         General: No swelling, tenderness or signs of injury. Normal range of motion.   Skin:     General: Skin is warm and dry.      Coloration: Skin is not jaundiced.      Findings: No bruising, erythema or rash.   Neurological:      General: No focal deficit present.      Mental Status: She is alert and oriented to person, place, and time.      Cranial Nerves: No cranial nerve deficit.      Sensory: No sensory deficit.   Psychiatric:         Mood and Affect: Mood normal.         Behavior: Behavior normal.          Lab Results  Results for orders placed or performed during the hospital encounter of 11/29/23 (from the past 24 hour(s))   CBC and Auto Differential   Result Value  Ref Range    WBC 11.0 4.4 - 11.3 x10*3/uL    nRBC 0.0 0.0 - 0.0 /100 WBCs    RBC 4.31 4.00 - 5.20 x10*6/uL    Hemoglobin 13.2 12.0 - 16.0 g/dL    Hematocrit 38.1 36.0 - 46.0 %    MCV 88 80 - 100 fL    MCH 30.6 26.0 - 34.0 pg    MCHC 34.6 32.0 - 36.0 g/dL    RDW 12.2 11.5 - 14.5 %    Platelets 282 150 - 450 x10*3/uL    Neutrophils % 66.0 40.0 - 80.0 %    Immature Granulocytes %, Automated 0.6 0.0 - 0.9 %    Lymphocytes % 22.2 13.0 - 44.0 %    Monocytes % 8.2 2.0 - 10.0 %    Eosinophils % 2.5 0.0 - 6.0 %    Basophils % 0.5 0.0 - 2.0 %    Neutrophils Absolute 7.26 1.20 - 7.70 x10*3/uL    Immature Granulocytes Absolute, Automated 0.07 0.00 - 0.70 x10*3/uL    Lymphocytes Absolute 2.44 1.20 - 4.80 x10*3/uL    Monocytes Absolute 0.90 0.10 - 1.00 x10*3/uL    Eosinophils Absolute 0.28 0.00 - 0.70 x10*3/uL    Basophils Absolute 0.06 0.00 - 0.10 x10*3/uL   Comprehensive metabolic panel   Result Value Ref Range    Glucose 107 (H) 74 - 99 mg/dL    Sodium 133 (L) 136 - 145 mmol/L    Potassium 3.4 (L) 3.5 - 5.3 mmol/L    Chloride 102 98 - 107 mmol/L    Bicarbonate 23 21 - 32 mmol/L    Anion Gap 11 10 - 20 mmol/L    Urea Nitrogen 10 6 - 23 mg/dL    Creatinine 0.77 0.50 - 1.05 mg/dL    eGFR >90 >60 mL/min/1.73m*2    Calcium 8.9 8.6 - 10.3 mg/dL    Albumin 4.3 3.4 - 5.0 g/dL    Alkaline Phosphatase 62 33 - 110 U/L    Total Protein 7.1 6.4 - 8.2 g/dL    AST 13 9 - 39 U/L    Bilirubin, Total 0.3 0.0 - 1.2 mg/dL    ALT 10 7 - 45 U/L   Magnesium   Result Value Ref Range    Magnesium 1.73 1.60 - 2.40 mg/dL   Phosphorus   Result Value Ref Range    Phosphorus 2.6 2.5 - 4.9 mg/dL   B-Type Natriuretic Peptide   Result Value Ref Range    BNP 93 0 - 99 pg/mL   Troponin I, High Sensitivity   Result Value Ref Range    Troponin I, High Sensitivity 682 (HH) 0 - 13 ng/L   D-Dimer, Quantitative Non VTE   Result Value Ref Range    D-Dimer Non VTE, Quant (ng/mL FEU) 1,096 (H) <=500 ng/mL FEU   Blood Gas Venous Full Panel   Result Value Ref Range     POCT pH, Venous 7.48 (H) 7.33 - 7.43 pH    POCT pCO2, Venous 32 (L) 41 - 51 mm Hg    POCT pO2, Venous 30 (L) 35 - 45 mm Hg    POCT SO2, Venous 46 45 - 75 %    POCT Oxy Hemoglobin, Venous 45.0 45.0 - 75.0 %    POCT Hematocrit Calculated, Venous 41.0 36.0 - 46.0 %    POCT Sodium, Venous 134 (L) 136 - 145 mmol/L    POCT Potassium, Venous 3.5 3.5 - 5.3 mmol/L    POCT Chloride, Venous 103 98 - 107 mmol/L    POCT Ionized Calicum, Venous 1.13 1.10 - 1.33 mmol/L    POCT Glucose, Venous 113 (H) 74 - 99 mg/dL    POCT Lactate, Venous 1.4 0.4 - 2.0 mmol/L    POCT Base Excess, Venous 0.9 -2.0 - 3.0 mmol/L    POCT HCO3 Calculated, Venous 23.8 22.0 - 26.0 mmol/L    POCT Hemoglobin, Venous 13.5 12.0 - 16.0 g/dL    POCT Anion Gap, Venous 11.0 10.0 - 25.0 mmol/L    Patient Temperature 37.0 degrees Celsius    FiO2 21 %   Coagulation Screen   Result Value Ref Range    Protime 11.8 9.8 - 12.8 seconds    INR 1.0 0.9 - 1.1    aPTT 34 27 - 38 seconds   Troponin I, High Sensitivity   Result Value Ref Range    Troponin I, High Sensitivity 450 (HH) 0 - 13 ng/L   Transthoracic Echo (TTE) Limited   Result Value Ref Range    LV biplane EF 58     MV E/A ratio 0.79     LVIDd 4.61     LV A4C EF 56.7    Troponin I, High Sensitivity   Result Value Ref Range    Troponin I, High Sensitivity 5,818 (HH) 0 - 13 ng/L        Image Results  Transthoracic Echo (TTE) Limited                [place] [id number]       Phone [phone] Fax [phone]    TRANSTHORACIC ECHOCARDIOGRAM REPORT       Patient Name:      LOLA ISABELLA Cornelius Physician:   [id number] Noe Jo DO  Study Date:        11/29/2023          Ordering Provider:   [id number] STEFAN ODOM  MRN/PID:           [id number]            Fellow:  Accession#:        [id number]        Nurse:               Radha Interiano RN  Date of Birth/Age: 1975 / 48      Sonographer:         Karina Thompson  RDCS                     years  Gender:            F                   Additional Staff:  Height:            160.02 cm           Admit Date:          11/29/2023  Weight:            72.58 kg            Admission Status:    Inpatient - Routine  BSA:               1.76 m2             Department Location: [facility]  Blood Pressure: 139 /81 mmHg    Study Type:    TRANSTHORACIC ECHO (TTE) LIMITED  Diagnosis/ICD: Non ST elevation (NSTEMI) myocardial infarction-I21.4  Indication:    NSTEMI  CPT Codes:     Echo Limited-51241; Doppler Limited-50178    Patient History: No pertinent past medical history.  Study Detail: The following Echo studies were performed: 2D, M-Mode and Doppler.                Optison used as a contrast agent for endocardial border                definition. Total contrast used for this procedure was 3 mL via IV                push.       PHYSICIAN INTERPRETATION:  Left Ventricle: Left ventricular systolic function is low normal, with an estimated ejection fraction of 50-55%. Wall motion is abnormal. The left ventricular cavity size is normal. Left ventricular diastolic filling was not assessed.  LV Wall Scoring:  The apical septal segment, apical anterior segment, and apex are akinetic. The  apical inferior segment is hypokinetic. All remaining scored segments are  normal.    Left Atrium: The left atrium was not assessed.  Right Ventricle: The right ventricle was not assessed. Right ventricular systolic function not assessed.  Right Atrium: The right atrium was not assessed.  Aortic Valve: The aortic valve was not assessed. Aortic valve regurgitation was not assessed.  Mitral Valve: The mitral valve was not assessed. Mitral valve regurgitation was not assessed.  Tricuspid Valve: The tricuspid valve was not assessed. Tricuspid regurgitation was not assessed.  Pulmonic Valve: The pulmonic valve was not assessed. The pulmonic valve regurgitation was not assessed.  Pericardium: Pericardial effusion was not  assessed.  Aorta: The aortic root was not assessed.       CONCLUSIONS:   1. Left ventricular systolic function is low normal with a 50-55% estimated ejection fraction.   2. Apical septal segment, apical anterior segment, apex, and apical inferior segment are abnormal.    QUANTITATIVE DATA SUMMARY:  2D MEASUREMENTS:                           Normal Ranges:  IVSd:          0.73 cm   (0.6-1.1cm)  LVPWd:         0.70 cm   (0.6-1.1cm)  LVIDd:         4.61 cm   (3.9-5.9cm)  LVIDs:         2.82 cm  LV Mass Index: 58.3 g/m2  LV % FS        38.8 %    LV SYSTOLIC FUNCTION BY 2D PLANIMETRY (MOD):                      Normal Ranges:  EF-A4C View: 56.7 % (>=55%)  EF-A2C View: 57.3 %  EF-Biplane:  57.8 %    LV DIASTOLIC FUNCTION:                         Normal Ranges:  MV Peak E:    0.68 m/s (0.7-1.2 m/s)  MV Peak A:    0.86 m/s (0.42-0.7 m/s)  E/A Ratio:    0.79     (1.0-2.2)  MV lateral e' 0.08 m/s  MV medial e'  0.06 m/s       [id number] Noe Jo DO  Electronically signed on 11/29/2023 at 2:40:42 PM       Wall Scoring       ** Final **  Cardiac catheterization - [facility], Cath Lab  [address] [place] [address]     Phone [phone] Fax [phone]    Cardiovascular Catheterization Report    Patient Name:     LOLA FORTUNE Performing           [id number] Hermosillo                                        Physician:           MD  Study Date:       11/25/2023          Verifying Physician: Yenifer Hermosillo MD  MRN/PID:          [id number]            Cardiologist:  Accession#:       [id number]        Ordering Provider:   [id number] HERMOSILLO  Date of           1975 / 48      Fellow:  Birth/Age:        years  Gender:           F                   Fellow:  Encounter#:       [id number]       Study: Left Heart Cath       Indications:  LOLA FORTUNE is a 49 year old female who presents with dyslipidemia and a chest pain  assessment of atypical angina. NSTE - ACS.     Appropriate Use Criteria:  Non ST elevation myocardial infarction with high risk score; AUC score = 9.     Procedure Description:  After infiltration with 2% Lidocaine, the right radial artery was cannulated with a modified Seldinger technique. Subsequently a 6 Omani sheath was placed retrograde in the right radial artery. Selective coronary catheterization was performed using a 6 Fr catheter(s) exchanged over a guide wire to cannulate the coronary arteries. A IKARI 3.5 tip catheter was used for left coronary injections. A TIG 4 tip catheter was used for right coronary injections.  Multiple injections of contrast were made into the left and right coronary arteries with angiograms recorded in multiple projections. After completion of the procedure, the arterial sheath was pulled and a TR Band Radial Compression Device was utilized to obtain patent hemostasis.     Coronary Angiography:  The coronary circulation is co-dominant.     Left Main Coronary Artery:  The left main coronary artery is a normal caliber vessel. The left main arises normally from the left coronary sinus of Valsalva. The left main coronary artery showed no significant disease or stenosis greater than 30%.     Left Anterior Descending Coronary Artery Distribution:  The left anterior descending coronary artery is a normal caliber vessel. The LAD arises normally from the left main coronary artery. The LAD demonstrated severe tortuosity. There is no significant stenosis in the proximal LAD.. Distal LAD has area of diffuse narrowing which can represent SCAD.     Circumflex Coronary Artery Distribution:  The circumflex coronary artery is a normal caliber vessel. The circumflex arises normally from the left main coronary artery. The circumflex revealed no significant disease or stenosis greater than 30%. The 1st obtuse marginal branch is a medium-sized caliber vessel. The 1st obtuse marginal branch showed no  significant disease or stenosis greater than 30%. The 2nd obtuse marginal branch is a medium-sized caliber vessel. The 2nd obtuse marginal branch demonstrated no significant disease or stenosis greater than 30%.     Right Coronary Artery Distribution:    The right coronary artery is a normal caliber vessel. The RCA arises normally from the right sinus of Valsalva. The RCA showed no significant disease or stenosis greater than 30%.       Left Ventriculography:  The size of the left ventricle is normal. Global left ventricular systolic function was normal. Dyskinesis of the apical LV regional wall segment is seen.     Hemo Personnel:  +-------------------+-------------+  Name               Duty           +-------------------+-------------+  Gene Hermosillo MD, MD 1  +-------------------+-------------+  Blossom Gomez RNPROC RECORD 1  +-------------------+-------------+       Hemodynamic Pressures:     +----+---------------+----------+-------------+--------------+-------+---------+  Site   Date Time   Phase NameSystolic mmHgDiastolic mmHgED mmHgMean mmHg  +----+---------------+----------+-------------+--------------+-------+---------+    AO     11/25/2023  AIR REST          127            85             106           3:24:18 PM                                                       +----+---------------+----------+-------------+--------------+-------+---------+    LV     11/25/2023  AIR REST          106             6     18                    4:01:12 PM                                                       +----+---------------+----------+-------------+--------------+-------+---------+    LV     11/25/2023  AIR REST           94             5     17                    4:03:38 PM                                                       +----+---------------+----------+-------------+--------------+-------+---------+   LVp     11/25/2023  AIR REST           111             7     18                    4:03:45 PM                                                       +----+---------------+----------+-------------+--------------+-------+---------+   [facility]     11/25/2023  AIR REST          105            66              85           4:03:52 PM                                                       +----+---------------+----------+-------------+--------------+-------+---------+         Oxygen Saturation %:  +-----------+------------+  Sample SiteHB (g/100ml)  +-----------+------------+      SYS ART        13.4  +-----------+------------+      SYS CELESTINO        13.4  +-----------+------------+      PUL ART        13.4  +-----------+------------+      PUL CELESTINO        13.4  +-----------+------------+       Complications:  No in-lab complications observed.     Cardiac Cath Post Procedure Notes:  Post Procedure Diagnosis: SCAD.  Blood Loss:               Estimated blood loss during the procedure was 10 mls.  Specimens Removed:        Number of specimen(s) removed: none.       Recommendations:  Maximize medical therapy.  Agressive risk factor modification efforts.  Telemetry monitoring.  Follow-up with cardiology clinic.  Will refer to vascular medicine for evaluation of SCAD/FMD.  Will recommend DAPT for one month, followed by ASA 81 mg daily.  Consider referral to cardiac rehabilitation.    ____________________________________________________________________________________  CONCLUSIONS:   1. There is diffuse area of narrowing in distal LAD. This may represent spontaneous coronary dissection.   2. Non obstructive CAD elsewhere as specified above.   3. Apical dyskinesis on LVgram with overall hyperdynamic LV function.   4. Left Ventricular end-diastolic pressure = 18 mmHg.    ICD 10 Codes:  Non ST elevation (NSTEMI) myocardial infarction-I21.4     CPT Codes:  Left Heart Cath (visualization of coronaries) and LV-66405; Moderate Sedation  Services 1st additional 15 minutes patient >5 years-19471; Moderate Sedation Services 2nd additional 15 minutes patient >5 years-81044; Moderate Sedation Services 3rd additional 15 minutes patient >5 years-89352     [id number] Gene Hermosillo MD  Performing Physician  Electronically signed by [id number] Gene Hermosillo MD on 11/29/2023 at 1:08:43 PM         ** Final **       Assessment/Plan     Acute chest pain  Known history of Takotsubo cardiomyopathy.  Troponins were trending down but back up again sharply.  Continue current medication for pain.  Started on heparin infusion and added Isordil.  Discussed with cardiology, plans catheterization tomorrow.  Consider coronary vasospasm.                   Vadim Goldman MD

## 2023-11-30 NOTE — PROGRESS NOTES
Cardiology:    Repeat EKG performed 11/29/23 1603 - sinus rhythm HR 68, possible anterior infarct age indeterminate, minimal ST elevation (<= 0.5mm) in anterolateral/inferior leads without reciprocal changes, which does not meet strict STEMI criteria.    TTE reviewed:  low normal LV fcn EF 50-55%, apical septal/apical anterior/apex akinetic, apical inferior HK.      Based on the above, I did repeat a HSTI - this did rise frin 450 > 5818.  Following this the patient was assessed for chest pain, and stated it had significantly improved / nearly resolved.  She was initiated on heparin infusion ACS protocol as well as isordil 10mg TID, and continued on ASA, clopidogrel, metoprolol, and statin.  She will be kept NPO after midnight for repeat LHC/cor angio by Dr. Hermosillo tomorrow.

## 2023-11-30 NOTE — PROGRESS NOTES
Mandie Arevalo is a 48 y.o. female on day 1 of admission presenting with Spontaneous dissection of coronary artery.    Review of Systems   Constitutional:  Negative for chills and fever.   Eyes:  Negative for photophobia and visual disturbance.   Respiratory:  Negative for cough and shortness of breath.    Cardiovascular:  Negative for chest pain, palpitations and leg swelling.   Gastrointestinal:  Negative for abdominal pain, blood in stool, nausea and vomiting.   Endocrine: Negative for polydipsia and polyuria.   Genitourinary:  Negative for decreased urine volume, dysuria, hematuria and urgency.   Skin:  Negative for color change and rash.   Neurological:  Negative for dizziness, tremors, syncope, weakness and light-headedness.   Psychiatric/Behavioral:  Negative for confusion and sleep disturbance.    All other systems reviewed and are negative.     Subjective   Mandie Arevalo is a 48 y.o.  female female with a past medical history significant for with no significant past medical history.  Patient was recently admitted from 11/25/2023 till 11/27/2023 with concerns for with elevated troponins which was initially at 121 and then peaked at 1334 and has been downtrending since to about 450.  A LHC was performed which showed nonobstructive CAD with LV gram showing hyperdynamic base with akinetic/dyskinetic apex which may be suggestive of stress-induced/Takotsubo's cardiomyopathy as well as a possibly occluded LAD at some point and at that time of angiography the LAD had recanalized as there was mention of wall motion abnormalities.  The distal LAD had diffuse disease which can be seen in patients with scad and may represent type III scad with recommendations to manage conservatively with DAPT as well as statin therapy as well as beta-blocker therapy.  Patient was discharged on 11/27/2023 and was asymptomatic without any concerns with need for close outpatient follow-up with cardiology and cardiac  rehab.     Patient states that this morning she developed very similar symptoms as when she had previously presented on 11/25/2023.  She stated that it awoke her from sleep and progressively worsened until presentation and has persisted since presentation with only mild to maybe moderate improvement in her symptoms.  She stated that she had developed sudden onset 10 out of 10 radiating sharp/pressure-like stabbing pain in her upper chest wall radiating into bilateral arms and into her neck as well as upper back.  She had also developed complained of numbness and tingling in her upper extremities as well as into her upper back as well as in her anterior chest.  She stated that the only thing that had changed with regards to the symptoms was she did not have any nausea during the episode prior to presentation today.  Since discharge she denied any recent sick contacts, chemical/environmental exposures, changes in dietary habits or any recent traumatic events/falls other than noted above.  She denies any fevers, chills, night sweats, vision changes, auditory changes, change in taste/smell, loss of bowel/bladder control, loss consciousness, dizziness, vertigo, syncope, seizure-like activity, palpitations, shortness of breath, coughing, wheezing, congestion, hemoptysis, hematemesis, abdominal pain, nausea, vomiting, diarrhea, constipation, dysuria, hematuria, dyschezia, hematochezia or any lateralizing motor/sensory deficits other than noted above.    11/29: Patient seen.  Troponin has increased again.  Patient was complaining of chest pain earlier but better with repeat troponin to 60 Percocet.  With rising troponin 323 spoke with cardiology.  Recommend starting on heparin and Isordil.  Will arrange for cardiac catheterization tomorrow.  Patient with known Takotsubo.  Suspect medical management.  Possibly coronary vasospasm.  Currently chest pain-free.    11/30: Patient seen. Troponin increased this morning to 8570  compared to 5818 yesterday. Cardiology evaluated the patient yesterday after troponin diego from 450 to 5818, at that time per the cardiology note, the patient's chest pain significantly improved/nearly resolved. Patient was initiated on heparin infusion ACS protocol as well as isordil 10mg TID, and continued on ASA, clopidogrel, and statin. Metoprolol was held last night as the patient's blood pressure was decreasing and was as low as 98/61. Blood pressure today has been improving this morning, continue to monitor. Cardiac catheterization was preformed today, SCAD found in distal LAD, worsening compared to prior angiogram per cardiology documentation. No significant CAD was found elsewhere. Cardiology recommends medical management of SCAD, up titration of betablocker and isordil as tolerated. Current dose of metoprolol is 37.5 mg once daily. Patient has been moved to the ICU by cardiology after catheterization today to closely monitor blood pressure. Patient states she is doing fine after the procedure.   Discussed with cardiology.  Wishes to continue to monitor the patient over the next few days and ensure her enzymes trend downwards.  They will continue to titrate her medications as needed.       Objective     Last Recorded Vitals  BP (!) 92/47   Pulse 84   Temp 37.4 °C (99.3 °F)   Resp 16   Wt 81.5 kg (179 lb 10.8 oz)   SpO2 92%   Intake/Output last 3 Shifts:    Intake/Output Summary (Last 24 hours) at 11/30/2023 1840  Last data filed at 11/30/2023 0957  Gross per 24 hour   Intake 132 ml   Output 5 ml   Net 127 ml       Admission Weight  Weight: 72.6 kg (160 lb) (11/29/23 0059)    Daily Weight  11/30/23 : 81.5 kg (179 lb 10.8 oz)      Physical Exam  Constitutional:       Appearance: Normal appearance. She is normal weight.   HENT:      Head: Normocephalic and atraumatic.      Nose: Nose normal. No congestion or rhinorrhea.      Mouth/Throat:      Mouth: Mucous membranes are dry.      Pharynx: Oropharynx is  clear.   Eyes:      General: No scleral icterus.     Extraocular Movements: Extraocular movements intact.      Conjunctiva/sclera: Conjunctivae normal.      Pupils: Pupils are equal, round, and reactive to light.   Cardiovascular:      Rate and Rhythm: Normal rate and regular rhythm.      Pulses: Normal pulses.      Heart sounds: No murmur heard.     No gallop.   Pulmonary:      Effort: Pulmonary effort is normal.      Breath sounds: Normal breath sounds. No wheezing, rhonchi or rales.   Chest:      Chest wall: No tenderness.   Abdominal:      General: Abdomen is flat. There is no distension.      Palpations: Abdomen is soft.      Tenderness: There is no abdominal tenderness. There is no guarding or rebound.   Musculoskeletal:         General: No swelling, tenderness or signs of injury. Normal range of motion.   Skin:     General: Skin is warm and dry.      Coloration: Skin is not jaundiced.      Findings: No bruising, erythema or rash.   Neurological:      General: No focal deficit present.      Mental Status: She is alert and oriented to person, place, and time.      Cranial Nerves: No cranial nerve deficit.      Sensory: No sensory deficit.   Psychiatric:         Mood and Affect: Mood normal.         Behavior: Behavior normal.          Lab Results  Results for orders placed or performed during the hospital encounter of 11/29/23 (from the past 24 hour(s))   Heparin Assay, UFH   Result Value Ref Range    Heparin Unfractionated 0.6 See Comment Below for Therapeutic Ranges IU/mL   Troponin I, High Sensitivity   Result Value Ref Range    Troponin I, High Sensitivity 8,570 (HH) 0 - 13 ng/L   Basic Metabolic Panel   Result Value Ref Range    Glucose 113 (H) 74 - 99 mg/dL    Sodium 133 (L) 136 - 145 mmol/L    Potassium 4.2 3.5 - 5.3 mmol/L    Chloride 103 98 - 107 mmol/L    Bicarbonate 23 21 - 32 mmol/L    Anion Gap 11 10 - 20 mmol/L    Urea Nitrogen 8 6 - 23 mg/dL    Creatinine 0.76 0.50 - 1.05 mg/dL    eGFR >90 >60  mL/min/1.73m*2    Calcium 8.5 (L) 8.6 - 10.3 mg/dL   Magnesium   Result Value Ref Range    Magnesium 1.71 1.60 - 2.40 mg/dL   CBC   Result Value Ref Range    WBC 13.6 (H) 4.4 - 11.3 x10*3/uL    nRBC 0.0 0.0 - 0.0 /100 WBCs    RBC 3.93 (L) 4.00 - 5.20 x10*6/uL    Hemoglobin 12.1 12.0 - 16.0 g/dL    Hematocrit 35.0 (L) 36.0 - 46.0 %    MCV 89 80 - 100 fL    MCH 30.8 26.0 - 34.0 pg    MCHC 34.6 32.0 - 36.0 g/dL    RDW 12.4 11.5 - 14.5 %    Platelets 261 150 - 450 x10*3/uL   Heparin Assay, UFH   Result Value Ref Range    Heparin Unfractionated 0.6 See Comment Below for Therapeutic Ranges IU/mL   ECG 12 lead   Result Value Ref Range    Ventricular Rate 79 BPM    Atrial Rate 79 BPM    AL Interval 160 ms    QRS Duration 72 ms    QT Interval 374 ms    QTC Calculation(Bazett) 428 ms    P Axis 37 degrees    R Axis 46 degrees    T Axis 39 degrees    QRS Count 13 beats    Q Onset 221 ms    P Onset 141 ms    P Offset 188 ms    T Offset 408 ms    QTC Fredericia 410 ms   ECG 12 Lead   Result Value Ref Range    Ventricular Rate 68 BPM    Atrial Rate 68 BPM    AL Interval 170 ms    QRS Duration 78 ms    QT Interval 396 ms    QTC Calculation(Bazett) 421 ms    P Axis 37 degrees    R Axis 67 degrees    T Axis 50 degrees    QRS Count 12 beats    Q Onset 222 ms    P Onset 137 ms    P Offset 192 ms    T Offset 420 ms    QTC Fredericia 413 ms   ECG 12 Lead   Result Value Ref Range    Ventricular Rate 74 BPM    Atrial Rate 74 BPM    AL Interval 162 ms    QRS Duration 76 ms    QT Interval 378 ms    QTC Calculation(Bazett) 419 ms    P Axis 32 degrees    R Axis 49 degrees    T Axis 55 degrees    QRS Count 12 beats    Q Onset 223 ms    P Onset 142 ms    P Offset 192 ms    T Offset 412 ms    QTC Fredericia 405 ms   Electrocardiogram 12 Lead   Result Value Ref Range    Ventricular Rate 80 BPM    Atrial Rate 80 BPM    AL Interval 158 ms    QRS Duration 74 ms    QT Interval 360 ms    QTC Calculation(Bazett) 415 ms    P Axis 28 degrees    R Axis 47  degrees    T Axis 50 degrees    QRS Count 13 beats    Q Onset 222 ms    P Onset 143 ms    P Offset 199 ms    T Offset 402 ms    QTC Fredericia 396 ms        Image Results  Electrocardiogram 12 Lead  Normal sinus rhythm  Nonspecific ST and T wave abnormality  Abnormal ECG  No previous ECGs available  Confirmed by Noe Jo [id number]) on 11/30/2023 4:52:26 PM  ECG 12 Lead  Normal sinus rhythm  Nonspecific ST and T wave abnormality  Abnormal ECG  When compared with ECG of 29-NOV-2023 20:40,  No significant change was found  Confirmed by Noe Jo [id number]) on 11/30/2023 2:08:53 PM  ECG 12 Lead  Normal sinus rhythm  Possible Anterior infarct , age undetermined  Abnormal ECG  When compared with ECG of 29-NOV-2023 01:06,  PREVIOUS ECG IS PRESENT  Confirmed by Travis Ferrer ([id number]) on 11/30/2023 7:57:35 AM  ECG 12 lead  Normal sinus rhythm  Nonspecific ST and T wave abnormality  Minimal ST elevation in the anterolateral and inferior leads - does not meet strict STEMI criteria  Abnormal ECG  When compared with ECG of 29-NOV-2023 16:03, (unconfirmed)  No significant change was found  Confirmed by Travis Ferrer ([id number]) on 11/30/2023 7:57:05 AM  Cardiac catheterization - [facility], Cath Lab  [address] [place] [address]     Phone [phone] Fax [phone]    Cardiovascular Catheterization Report    Patient Name:     LOLA FORTUNE Performing           [id number] Gene Hermosillo                                        Physician:           MD  Study Date:       11/25/2023          Verifying Physician: Yenifer Hermosillo MD  MRN/PID:          [id number]            Cardiologist:  Accession#:       [id number]        Ordering Provider:   [id number] HERMOSILLO  Date of           1975 / 48      Fellow:  Birth/Age:        years  Gender:           F                   Fellow:  Encounter#:       [id number]       Study: Left Heart  Cath       Indications:  LOLA FORTUNE is a 49 year old female who presents with dyslipidemia and a chest pain assessment of atypical angina. NSTE - ACS.     Appropriate Use Criteria:  Non ST elevation myocardial infarction with high risk score; AUC score = 9.     Procedure Description:  After infiltration with 2% Lidocaine, the right radial artery was cannulated with a modified Seldinger technique. Subsequently a 6 Ukrainian sheath was placed retrograde in the right radial artery. Selective coronary catheterization was performed using a 6 Fr catheter(s) exchanged over a guide wire to cannulate the coronary arteries. A IKARI 3.5 tip catheter was used for left coronary injections. A TIG 4 tip catheter was used for right coronary injections.  Multiple injections of contrast were made into the left and right coronary arteries with angiograms recorded in multiple projections. After completion of the procedure, the arterial sheath was pulled and a TR Band Radial Compression Device was utilized to obtain patent hemostasis.     Coronary Angiography:  The coronary circulation is co-dominant.     Left Main Coronary Artery:  The left main coronary artery is a normal caliber vessel. The left main arises normally from the left coronary sinus of Valsalva. The left main coronary artery showed no significant disease or stenosis greater than 30%.     Left Anterior Descending Coronary Artery Distribution:  The left anterior descending coronary artery is a normal caliber vessel. The LAD arises normally from the left main coronary artery. The LAD demonstrated severe tortuosity. There is no significant stenosis in the proximal LAD.. Distal LAD has area of diffuse narrowing which can represent SCAD.     Circumflex Coronary Artery Distribution:  The circumflex coronary artery is a normal caliber vessel. The circumflex arises normally from the left main coronary artery. The circumflex revealed no significant disease or stenosis greater  than 30%. The 1st obtuse marginal branch is a medium-sized caliber vessel. The 1st obtuse marginal branch showed no significant disease or stenosis greater than 30%. The 2nd obtuse marginal branch is a medium-sized caliber vessel. The 2nd obtuse marginal branch demonstrated no significant disease or stenosis greater than 30%.     Right Coronary Artery Distribution:    The right coronary artery is a normal caliber vessel. The RCA arises normally from the right sinus of Valsalva. The RCA showed no significant disease or stenosis greater than 30%.       Left Ventriculography:  The size of the left ventricle is normal. Global left ventricular systolic function was normal. Dyskinesis of the apical LV regional wall segment is seen.     Hemo Personnel:  +-------------------+-------------+  Name               Duty           +-------------------+-------------+  Gene Hermosillo MD, MD 1  +-------------------+-------------+  Blossom Gomez RNPROC RECORD 1  +-------------------+-------------+       Hemodynamic Pressures:     +----+---------------+----------+-------------+--------------+-------+---------+  Site   Date Time   Phase NameSystolic mmHgDiastolic mmHgED mmHgMean mmHg  +----+---------------+----------+-------------+--------------+-------+---------+    AO     11/25/2023  AIR REST          127            85             106           3:24:18 PM                                                       +----+---------------+----------+-------------+--------------+-------+---------+    LV     11/25/2023  AIR REST          106             6     18                    4:01:12 PM                                                       +----+---------------+----------+-------------+--------------+-------+---------+    LV     11/25/2023  AIR REST           94             5     17                    4:03:38 PM                                                        +----+---------------+----------+-------------+--------------+-------+---------+   LVp     11/25/2023  AIR REST          111             7     18                    4:03:45 PM                                                       +----+---------------+----------+-------------+--------------+-------+---------+   AOp     11/25/2023  AIR REST          105            66              85           4:03:52 PM                                                       +----+---------------+----------+-------------+--------------+-------+---------+         Oxygen Saturation %:  +-----------+------------+  Sample SiteHB (g/100ml)  +-----------+------------+      SYS ART        13.4  +-----------+------------+      SYS CELESTINO        13.4  +-----------+------------+      PUL ART        13.4  +-----------+------------+      PUL CELESTINO        13.4  +-----------+------------+       Complications:  No in-lab complications observed.     Cardiac Cath Post Procedure Notes:  Post Procedure Diagnosis: SCAD.  Blood Loss:               Estimated blood loss during the procedure was 10 mls.  Specimens Removed:        Number of specimen(s) removed: none.       Recommendations:  Maximize medical therapy.  Agressive risk factor modification efforts.  Telemetry monitoring.  Follow-up with cardiology clinic.  Will refer to vascular medicine for evaluation of SCAD/FMD.  Will recommend DAPT for one month, followed by ASA 81 mg daily.  Consider referral to cardiac rehabilitation.    ____________________________________________________________________________________  CONCLUSIONS:   1. There is diffuse area of narrowing in distal LAD. This may represent spontaneous coronary dissection.   2. Non obstructive CAD elsewhere as specified above.   3. Apical dyskinesis on LVgram with overall hyperdynamic LV function.   4. Left Ventricular end-diastolic pressure = 18 mmHg.    ICD 10 Codes:  Non ST elevation (NSTEMI)  myocardial infarction-I21.4     CPT Codes:  Left Heart Cath (visualization of coronaries) and LV-06823; Moderate Sedation Services 1st additional 15 minutes patient >5 years-73280; Moderate Sedation Services 2nd additional 15 minutes patient >5 years-07390; Moderate Sedation Services 3rd additional 15 minutes patient >5 years-12872     [id number] Gene Hermosillo MD  Performing Physician  Electronically signed by [id number] Gene Hermosillo MD on 11/29/2023 at 1:08:43 PM         ** Final **       Assessment/Plan     Spontaneous dissection of coronary artery  Known history of Takotsubo cardiomyopathy.  Troponins continued to rise overnight.  Continue current medication for pain.  Started on heparin infusion and added Isordil. Heparin infusion completed, on ASA and Plavix.   Discussed with cardiology, heart catheterization procedure completed today.  SCAD in distal LV, cardiology recommends medical management   We will continue to monitor patient in TCU.  Cardiology will continue adjust medications.    NSTEMI (non-ST elevated myocardial infarction) (CMS/[facility])  See elsewhere                   Vadim Goldman MD

## 2023-11-30 NOTE — ASSESSMENT & PLAN NOTE
Known history of Takotsubo cardiomyopathy.  Continue current medication for pain.  Started on heparin infusion and added Isordil. Heparin infusion completed, on ASA and Plavix.   Discussed with cardiology, heart catheterization procedure completed yesterday.  SCAD in distal LV, cardiology recommends medical management   No chest pain today. Started Isordil per cards but dose adjusted due to hypotension.  Discharge to home, follow with cards in 2 days as scheduled.  Continue other medications.

## 2023-11-30 NOTE — CARE PLAN
The patient's goals for the shift include      The clinical goals for the shift include no chest pain    Over the shift, the patient did not make progress toward the following goals. Barriers to progression include CAD. Recommendations to address these barriers include monitor pain level.

## 2023-11-30 NOTE — BRIEF OP NOTE
Physician Transition of Care Summary  Invasive Cardiovascular Lab    Procedure Date: 11/30/2023  Attending:    Tylor Hermosillo - Primary  Resident/Fellow/Other Assistant: Surgeon(s) and Role:    Indications:   Pre-op Diagnosis     * Acute chest pain [R07.9]     * NSTEMI (non-ST elevated myocardial infarction) (CMS/HCC) [I21.4]    Post-procedure diagnosis:   Post-op Diagnosis     * Acute chest pain [R07.9]     * NSTEMI (non-ST elevated myocardial infarction) (CMS/HCC) [I21.4]    Procedure(s):     * Left Heart Cath, With LV      Procedure Findings:   SCAD in distal LAD. This is worsening compared to prior angiogram   No significant CAD elsewhere     Description of the Procedure:   As above     Complications:   None     Stents/Implants:     None     Anticoagulation/Antiplatelet Plan:   ASA and Plavix     Estimated Blood Loss:   5 mL    Anesthesia: Moderate Sedation Anesthesia Staff: No anesthesia staff entered.    Any Specimen(s) Removed:   No specimens collected during this procedure.    Disposition:   ICU  Medical management of SCAD   Up titration of betablocker and isordil as tolerated  Bedrest for 6 hours post procedure       Electronically signed by: Gene Hermosillo MD, 11/30/2023 10:59 AM

## 2023-11-30 NOTE — PRE-SEDATION DOCUMENTATION
Sedation Plan    ASA 3     Mallampati class: II.    Risks, benefits, and alternatives discussed with patient.    Moderate sedation

## 2023-11-30 NOTE — PROGRESS NOTES
"Subjective Data:  Patient reports that her chest pain is resolved at this time.  Echo was reviewed and compared between yesterday and earlier during last admission.  Wall motion abnormalities were worse on the current echo.    Overnight Events:    Patient's troponin up trended overnight.  Given this we decided to bring the patient to Cath Lab for evaluation.     Objective Data:  Last Recorded Vitals:  Vitals:    11/30/23 0325 11/30/23 0732 11/30/23 0852 11/30/23 1150   BP: 129/71 109/66     BP Location:  Left arm     Patient Position:  Lying     Pulse: 65 71 70    Resp: 16 16 12    Temp: 36.7 °C (98 °F) 36.8 °C (98.2 °F) 37.3 °C (99.2 °F) 36.6 °C (97.9 °F)   TempSrc:  Temporal  Temporal   SpO2: 96% 95% 100%    Weight: 81.5 kg (179 lb 10.8 oz)  81.5 kg (179 lb 10.8 oz)    Height:   1.6 m (5' 3\")        Last Labs:  CBC - 11/30/2023:  4:36 AM  13.6 12.1 261    35.0      CMP - 11/30/2023:  4:36 AM  8.5 7.1 13 --- 0.3   2.6 4.3 10 62      PTT - 11/29/2023:  1:11 AM  1.0   11.8 34     TROPHS   Date/Time Value Ref Range Status   11/30/2023 04:36 AM 8,570 0 - 13 ng/L Final   11/29/2023 05:48 PM 5,818 0 - 13 ng/L Final     Comment:     Previous result verified on 11/29/2023 0143 on specimen/case [id number] called with component TRPHS for procedure Troponin I, High Sensitivity with value 682 ng/L.   11/29/2023 02:28  0 - 13 ng/L Final     Comment:     Previous result verified on 11/29/2023 0143 on specimen/case [id number] called with component TRPHS for procedure Troponin I, High Sensitivity with value 682 ng/L.     BNP   Date/Time Value Ref Range Status   11/29/2023 01:09 AM 93 0 - 99 pg/mL Final   11/25/2023 09:47 AM 22 0 - 99 pg/mL Final     HGBA1C   Date/Time Value Ref Range Status   11/26/2023 04:23 AM 5.5 see below % Final     LDLCALC   Date/Time Value Ref Range Status   11/26/2023 04:23  <=99 mg/dL Final     Comment:                                 Near   Borderline      AGE      Desirable  " Optimal    High     High     Very High     0-19 Y     0 - 109     ---    110-129   >/= 130     ----    20-24 Y     0 - 119     ---    120-159   >/= 160     ----      >24 Y     0 -  99   100-129  130-159   160-189     >/=190       VLDL   Date/Time Value Ref Range Status   11/26/2023 04:23 AM 21 0 - 40 mg/dL Final      Last I/O:  I/O last 3 completed shifts:  In: 132 (1.6 mL/kg) [I.V.:132 (1.6 mL/kg)]  Out: - (0 mL/kg)   Weight: 81.5 kg     Past Cardiology Tests (Last 3 Years):  EKG:  Electrocardiogram 12 Lead 11/30/2023 (Preliminary)      ECG 12 Lead 11/30/2023      ECG 12 Lead 11/30/2023      ECG 12 lead 11/30/2023      ECG 12 Lead       ECG 12 Lead       ECG 12 Lead     Echo:  Transthoracic Echo (TTE) Limited 11/29/2023  PHYSICIAN INTERPRETATION:  Left Ventricle: Left ventricular systolic function is low normal, with an estimated ejection fraction of 50-55%. Wall motion is abnormal. The left ventricular cavity size is normal. Left ventricular diastolic filling was not assessed.  LV Wall Scoring:  The apical septal segment, apical anterior segment, and apex are akinetic. The  apical inferior segment is hypokinetic. All remaining scored segments are  normal.     Left Atrium: The left atrium was not assessed.  Right Ventricle: The right ventricle was not assessed. Right ventricular systolic function not assessed.  Right Atrium: The right atrium was not assessed.  Aortic Valve: The aortic valve was not assessed. Aortic valve regurgitation was not assessed.  Mitral Valve: The mitral valve was not assessed. Mitral valve regurgitation was not assessed.  Tricuspid Valve: The tricuspid valve was not assessed. Tricuspid regurgitation was not assessed.  Pulmonic Valve: The pulmonic valve was not assessed. The pulmonic valve regurgitation was not assessed.  Pericardium: Pericardial effusion was not assessed.  Aorta: The aortic root was not assessed.        CONCLUSIONS:   1. Left ventricular systolic function is low normal with  a 50-55% estimated ejection fraction.   2. Apical septal segment, apical anterior segment, apex, and apical inferior segment are abnormal.       Transthoracic Echo (TTE) Complete 11/27/2023    Ejection Fractions:  EF   Date/Time Value Ref Range Status   11/29/2023 01:55 PM 58     11/27/2023 08:00 AM 60       Cath:  Cardiac catheterization - coronary 11/25/2023    Stress Test:  No results found for this or any previous visit from the past 1095 days.    Cardiac Imaging:  No results found for this or any previous visit from the past 1095 days.      Inpatient Medications:  Scheduled medications   Medication Dose Route Frequency    aspirin  81 mg oral Daily    atorvastatin  40 mg oral Nightly    clopidogrel  75 mg oral Daily    isosorbide dinitrate  10 mg oral TID    lidocaine  1 patch transdermal Daily    metoprolol tartrate  37.5 mg oral q6h     PRN medications   Medication    acetaminophen    ondansetron    oxyCODONE-acetaminophen    sodium chloride 0.9%     Continuous Medications   Medication Dose Last Rate    sodium chloride 0.9%  1 mL/kg/hr         Physical Exam:  General: Alert and Oriented, No distress, cooperative  Head: Normocephalic without obvious abnormality, atraumatic  Eyes: Conjunctiva/corneas clear, EOM's grossly intact  Neck: Supple, trachea midline, No thyroid enlargement/tenderness/nodules; No JVD  Lungs: Clear to auscultation bilaterally, no wheezes, rhonci, or rales. respirations unlabored  Chest Wall: No tenderness or deformity  Heart: Regular rhythm, normal S1/S2, no murmur  Abdomen: Soft, non-tender, Non-distended, bowel sounds active  Extremities: No edema, no cyanosis, no clubbing  Skin: Skin color, texture, turgor normal.  No rashes or lesions noted  Neurologic: Alert and oriented x 3, grossly moving all extremities, speech intact       Assessment/Plan   -NSTEMI  -Spontaneous coronary dissection    Plan:  -Left heart cath was performed today that showed spontaneous coronary artery dissection  in the distal LAD which is worse when compared to the prior coronary angiography done on 11/25.  There is no proximal extension of the dissection.  -We will continue medical management at this point.  -We will continue aspirin and Plavix.  -We will continue Isordil 10 mg 3 times daily as previously taking  -We will uptitrate dose of beta-blocker to metoprolol tartrate 37.5 mg every 6 hours.  -We will continue statin therapy as previously taking  -Check troponin until downtrending.  -Monitor in the ICU for next 24 hours  -Bedrest for 5 hours postprocedure.  -IV fluids postprocedure per protocol.    Cardiology will follow  Peripheral IV 11/29/23 18 G Left;Proximal;Anterior Forearm (Active)   Site Assessment Clean;Dry 11/30/23 0000   Dressing Status Clean;Dry 11/30/23 0000   Number of days: 1       Peripheral IV 11/29/23 20 G Right;Anterior Forearm (Active)   Site Assessment Clean;Dry 11/30/23 0000   Dressing Status Clean;Dry 11/30/23 0000   Number of days: 1       Code Status:  Full Code            Gene Hermosillo MD

## 2023-11-30 NOTE — PROGRESS NOTES
11/30/23 1537   Discharge Planning   Living Arrangements Spouse/significant other   Support Systems Spouse/significant other   Assistance Needed none   Type of Residence Private residence   Number of Stairs to Enter Residence 3   Number of Stairs Within Residence 1   Home or Post Acute Services None   Patient expects to be discharged to: home   Financial Resource Strain   How hard is it for you to pay for the very basics like food, housing, medical care, and heating? Not very   Housing Stability   In the last 12 months, was there a time when you were not able to pay the mortgage or rent on time? N   In the last 12 months, how many places have you lived? 1   In the last 12 months, was there a time when you did not have a steady place to sleep or slept in a shelter (including now)? N   Transportation Needs   In the past 12 months, has lack of transportation kept you from medical appointments or from getting medications? no   In the past 12 months, has lack of transportation kept you from meetings, work, or from getting things needed for daily living? No     Spoke with patient, she's independent at home with all self care, has multiple family members able to assist her with whatever she may need.  Patient plans to return home when she's medically ready, she denies any home going needs at this time.  Patient states she will contact us should she feel she needs home services.

## 2023-12-01 LAB
ALBUMIN SERPL BCP-MCNC: 3.7 G/DL (ref 3.4–5)
ANION GAP SERPL CALC-SCNC: 9 MMOL/L (ref 10–20)
BUN SERPL-MCNC: 8 MG/DL (ref 6–23)
CALCIUM SERPL-MCNC: 8.5 MG/DL (ref 8.6–10.3)
CARDIAC TROPONIN I PNL SERPL HS: 4296 NG/L (ref 0–13)
CHLORIDE SERPL-SCNC: 107 MMOL/L (ref 98–107)
CO2 SERPL-SCNC: 23 MMOL/L (ref 21–32)
CREAT SERPL-MCNC: 0.83 MG/DL (ref 0.5–1.05)
GFR SERPL CREATININE-BSD FRML MDRD: 87 ML/MIN/1.73M*2
GLUCOSE SERPL-MCNC: 96 MG/DL (ref 74–99)
PHOSPHATE SERPL-MCNC: 3 MG/DL (ref 2.5–4.9)
POTASSIUM SERPL-SCNC: 3.9 MMOL/L (ref 3.5–5.3)
SODIUM SERPL-SCNC: 135 MMOL/L (ref 136–145)

## 2023-12-01 PROCEDURE — 2500000004 HC RX 250 GENERAL PHARMACY W/ HCPCS (ALT 636 FOR OP/ED): Performed by: NURSE PRACTITIONER

## 2023-12-01 PROCEDURE — 80069 RENAL FUNCTION PANEL: CPT | Performed by: NURSE PRACTITIONER

## 2023-12-01 PROCEDURE — 36415 COLL VENOUS BLD VENIPUNCTURE: CPT | Performed by: NURSE PRACTITIONER

## 2023-12-01 PROCEDURE — 2500000001 HC RX 250 WO HCPCS SELF ADMINISTERED DRUGS (ALT 637 FOR MEDICARE OP): Performed by: NURSE PRACTITIONER

## 2023-12-01 PROCEDURE — 99291 CRITICAL CARE FIRST HOUR: CPT | Performed by: STUDENT IN AN ORGANIZED HEALTH CARE EDUCATION/TRAINING PROGRAM

## 2023-12-01 PROCEDURE — 2020000001 HC ICU ROOM DAILY

## 2023-12-01 PROCEDURE — 2500000005 HC RX 250 GENERAL PHARMACY W/O HCPCS: Performed by: NURSE PRACTITIONER

## 2023-12-01 PROCEDURE — 36415 COLL VENOUS BLD VENIPUNCTURE: CPT | Performed by: STUDENT IN AN ORGANIZED HEALTH CARE EDUCATION/TRAINING PROGRAM

## 2023-12-01 PROCEDURE — 84484 ASSAY OF TROPONIN QUANT: CPT | Performed by: STUDENT IN AN ORGANIZED HEALTH CARE EDUCATION/TRAINING PROGRAM

## 2023-12-01 PROCEDURE — 2500000001 HC RX 250 WO HCPCS SELF ADMINISTERED DRUGS (ALT 637 FOR MEDICARE OP): Performed by: STUDENT IN AN ORGANIZED HEALTH CARE EDUCATION/TRAINING PROGRAM

## 2023-12-01 PROCEDURE — 99233 SBSQ HOSP IP/OBS HIGH 50: CPT | Performed by: INTERNAL MEDICINE

## 2023-12-01 RX ORDER — METOPROLOL TARTRATE 50 MG/1
50 TABLET ORAL EVERY 6 HOURS
Status: DISCONTINUED | OUTPATIENT
Start: 2023-12-01 | End: 2023-12-01

## 2023-12-01 RX ORDER — METOPROLOL TARTRATE 25 MG/1
37.5 TABLET, FILM COATED ORAL EVERY 6 HOURS
Status: DISCONTINUED | OUTPATIENT
Start: 2023-12-01 | End: 2023-12-02

## 2023-12-01 RX ADMIN — CLOPIDOGREL BISULFATE 75 MG: 75 TABLET, FILM COATED ORAL at 09:15

## 2023-12-01 RX ADMIN — ASPIRIN 81 MG CHEWABLE TABLET 81 MG: 81 TABLET CHEWABLE at 09:15

## 2023-12-01 RX ADMIN — OXYCODONE HYDROCHLORIDE AND ACETAMINOPHEN 1 TABLET: 5; 325 TABLET ORAL at 12:11

## 2023-12-01 RX ADMIN — METOPROLOL TARTRATE 37.5 MG: 25 TABLET, FILM COATED ORAL at 20:54

## 2023-12-01 RX ADMIN — LIDOCAINE 1 PATCH: 4 PATCH TOPICAL at 09:15

## 2023-12-01 RX ADMIN — ATORVASTATIN CALCIUM 40 MG: 40 TABLET, FILM COATED ORAL at 20:55

## 2023-12-01 RX ADMIN — ONDANSETRON 4 MG: 2 INJECTION INTRAMUSCULAR; INTRAVENOUS at 15:31

## 2023-12-01 RX ADMIN — ISOSORBIDE DINITRATE 10 MG: 10 TABLET ORAL at 13:59

## 2023-12-01 RX ADMIN — ACETAMINOPHEN 650 MG: 325 TABLET ORAL at 19:24

## 2023-12-01 RX ADMIN — ISOSORBIDE DINITRATE 10 MG: 10 TABLET ORAL at 19:24

## 2023-12-01 RX ADMIN — ACETAMINOPHEN 650 MG: 325 TABLET ORAL at 05:21

## 2023-12-01 RX ADMIN — ISOSORBIDE DINITRATE 10 MG: 10 TABLET ORAL at 09:15

## 2023-12-01 RX ADMIN — METOPROLOL TARTRATE 37.5 MG: 25 TABLET, FILM COATED ORAL at 15:05

## 2023-12-01 RX ADMIN — METOPROLOL TARTRATE 37.5 MG: 25 TABLET, FILM COATED ORAL at 09:14

## 2023-12-01 ASSESSMENT — ENCOUNTER SYMPTOMS
VOMITING: 0
CHILLS: 0
NAUSEA: 0
CONFUSION: 0
LIGHT-HEADEDNESS: 0
DIZZINESS: 0
PALPITATIONS: 0
WEAKNESS: 0
FEVER: 0
ABDOMINAL PAIN: 0
SHORTNESS OF BREATH: 0
TREMORS: 0
COUGH: 0
POLYDIPSIA: 0
DYSURIA: 0
HEMATURIA: 0
SLEEP DISTURBANCE: 0
PHOTOPHOBIA: 0
COLOR CHANGE: 0
BLOOD IN STOOL: 0

## 2023-12-01 ASSESSMENT — COGNITIVE AND FUNCTIONAL STATUS - GENERAL
MOBILITY SCORE: 24
DAILY ACTIVITIY SCORE: 24
DAILY ACTIVITIY SCORE: 24
MOBILITY SCORE: 24

## 2023-12-01 ASSESSMENT — PAIN SCALES - GENERAL
PAINLEVEL_OUTOF10: 0 - NO PAIN
PAINLEVEL_OUTOF10: 0 - NO PAIN
PAINLEVEL_OUTOF10: 4
PAINLEVEL_OUTOF10: 0 - NO PAIN
PAINLEVEL_OUTOF10: 6
PAINLEVEL_OUTOF10: 0 - NO PAIN
PAINLEVEL_OUTOF10: 3
PAINLEVEL_OUTOF10: 0 - NO PAIN
PAINLEVEL_OUTOF10: 0 - NO PAIN

## 2023-12-01 ASSESSMENT — PAIN DESCRIPTION - LOCATION: LOCATION: HEAD

## 2023-12-01 ASSESSMENT — PAIN - FUNCTIONAL ASSESSMENT
PAIN_FUNCTIONAL_ASSESSMENT: 0-10

## 2023-12-01 NOTE — NURSING NOTE
"  During hourly rounding, Pt appears to be flat and depressed. Then pt becomes tearful. Pt reports of feeling anxious , fearful, and pt states,\" I just want to go home.\"  Talk to both pt and DTR at bedside, pt reports of feeling better. Medicated for HA 4/10. Continue monitoring.   "

## 2023-12-01 NOTE — CARE PLAN
Problem: ACS/CP/NSTEMI/STEMI  Goal: Chest pain managed (free from pain or at acceptable level)  Outcome: Progressing  Goal: Lab values return to normal range  Outcome: Progressing  Goal: Promote self management  Outcome: Progressing  Goal: Serial ECG will return to baseline  Outcome: Progressing  Goal: Verbalize understanding of procedures/devices  Outcome: Progressing  Goal: Wean vasopressors/achieve hemodynamic stability  Outcome: Progressing

## 2023-12-01 NOTE — PROGRESS NOTES
Subjective Data:  Patient reports that her chest pain is resolved at this time.  BP is soft. Overall tolerating metoprolol and isordil.     Overnight Events:    No acute events overnight.   Pending troponin level today,     Objective Data:  Last Recorded Vitals:  Vitals:    12/01/23 1105 12/01/23 1200 12/01/23 1208 12/01/23 1210   BP: 91/55 (!) 88/46 (!) 102/46    Pulse: 66 65 68    Resp: 15      Temp:    37.2 °C (99 °F)   TempSrc:       SpO2: 96% 96% 94%    Weight:       Height:           Last Labs:  CBC - 11/30/2023:  4:36 AM  13.6 12.1 261    35.0      CMP - 12/1/2023:  5:26 AM  8.5 7.1 13 --- 0.3   3.0 3.7 10 62      PTT - 11/29/2023:  1:11 AM  1.0   11.8 34     TROPHS   Date/Time Value Ref Range Status   11/30/2023 04:36 AM 8,570 0 - 13 ng/L Final   11/29/2023 05:48 PM 5,818 0 - 13 ng/L Final     Comment:     Previous result verified on 11/29/2023 0143 on specimen/case [id number] called with component TRPHS for procedure Troponin I, High Sensitivity with value 682 ng/L.   11/29/2023 02:28  0 - 13 ng/L Final     Comment:     Previous result verified on 11/29/2023 0143 on specimen/case [id number] called with component TRPHS for procedure Troponin I, High Sensitivity with value 682 ng/L.     BNP   Date/Time Value Ref Range Status   11/29/2023 01:09 AM 93 0 - 99 pg/mL Final   11/25/2023 09:47 AM 22 0 - 99 pg/mL Final     HGBA1C   Date/Time Value Ref Range Status   11/26/2023 04:23 AM 5.5 see below % Final     LDLCALC   Date/Time Value Ref Range Status   11/26/2023 04:23  <=99 mg/dL Final     Comment:                                 Near   Borderline      AGE      Desirable  Optimal    High     High     Very High     0-19 Y     0 - 109     ---    110-129   >/= 130     ----    20-24 Y     0 - 119     ---    120-159   >/= 160     ----      >24 Y     0 -  99   100-129  130-159   160-189     >/=190       VLDL   Date/Time Value Ref Range Status   11/26/2023 04:23 AM 21 0 - 40 mg/dL Final      Last  I/O:  I/O last 3 completed shifts:  In: 132 (1.6 mL/kg) [I.V.:132 (1.6 mL/kg)]  Out: 5 (0.1 mL/kg) [Blood:5]  Weight: 80.2 kg     Past Cardiology Tests (Last 3 Years):  EKG:  Electrocardiogram 12 Lead 11/30/2023      ECG 12 Lead 11/30/2023      ECG 12 Lead 11/30/2023      ECG 12 lead 11/30/2023      ECG 12 Lead       ECG 12 Lead       ECG 12 Lead     Echo:  Transthoracic Echo (TTE) Limited 11/29/2023 11/29/2023  PHYSICIAN INTERPRETATION:  Left Ventricle: Left ventricular systolic function is low normal, with an estimated ejection fraction of 50-55%. Wall motion is abnormal. The left ventricular cavity size is normal. Left ventricular diastolic filling was not assessed.  LV Wall Scoring:  The apical septal segment, apical anterior segment, and apex are akinetic. The  apical inferior segment is hypokinetic. All remaining scored segments are  normal.     Left Atrium: The left atrium was not assessed.  Right Ventricle: The right ventricle was not assessed. Right ventricular systolic function not assessed.  Right Atrium: The right atrium was not assessed.  Aortic Valve: The aortic valve was not assessed. Aortic valve regurgitation was not assessed.  Mitral Valve: The mitral valve was not assessed. Mitral valve regurgitation was not assessed.  Tricuspid Valve: The tricuspid valve was not assessed. Tricuspid regurgitation was not assessed.  Pulmonic Valve: The pulmonic valve was not assessed. The pulmonic valve regurgitation was not assessed.  Pericardium: Pericardial effusion was not assessed.  Aorta: The aortic root was not assessed.        CONCLUSIONS:   1. Left ventricular systolic function is low normal with a 50-55% estimated ejection fraction.   2. Apical septal segment, apical anterior segment, apex, and apical inferior segment are abnormal.     Transthoracic Echo (TTE) Complete 11/27/2023    Ejection Fractions:  EF   Date/Time Value Ref Range Status   11/29/2023 01:55 PM 58     11/27/2023 08:00 AM 60        Cath:  Cardiac catheterization - coronary 11/25/2023    Stress Test:  No results found for this or any previous visit from the past 1095 days.    Cardiac Imaging:  No results found for this or any previous visit from the past 1095 days.      Inpatient Medications:  Scheduled medications   Medication Dose Route Frequency    aspirin  81 mg oral Daily    atorvastatin  40 mg oral Nightly    clopidogrel  75 mg oral Daily    isosorbide dinitrate  10 mg oral TID    lidocaine  1 patch transdermal Daily    metoprolol tartrate  50 mg oral q6h     PRN medications   Medication    acetaminophen    ondansetron    oxyCODONE-acetaminophen    sodium chloride 0.9%     Continuous Medications   Medication Dose Last Rate       Physical Exam:  General: Alert and Oriented, No distress, cooperative  Head: Normocephalic without obvious abnormality, atraumatic  Eyes: Conjunctiva/corneas clear, EOM's grossly intact  Neck: Supple, trachea midline, No thyroid enlargement/tenderness/nodules; No JVD  Lungs: Clear to auscultation bilaterally, no wheezes, rhonci, or rales. respirations unlabored  Chest Wall: No tenderness or deformity  Heart: Regular rhythm, normal S1/S2, no murmur  Abdomen: Soft, non-tender, Non-distended, bowel sounds active  Extremities: No edema, no cyanosis, no clubbing  Skin: Skin color, texture, turgor normal.  No rashes or lesions noted  Neurologic: Alert and oriented x 3, grossly moving all extremities, speech intact     Assessment/Plan   -NSTEMI  -Spontaneous coronary dissection     Plan:  -Left heart cath was performed yesterday that showed spontaneous coronary artery dissection in the distal LAD which is worse when compared to the prior coronary angiography done on 11/25.  There is no proximal extension of the dissection.  -We will continue medical management at this point.  -We will continue aspirin and Plavix.  -We will continue Isordil 10 mg 3 times daily as previously taking  -We will continue metoprolol  tartrate 37.5 mg every 6 hours. Uptitrate to 50 mg Q6H if tolerated. Hold if SBP <90 or DBP < 50 mmHg.   -We will continue statin therapy as previously taking  -Check troponin until downtrending.  -Monitor in the ICU for next 24 hours  -Will need to remain inpatient for 24-48 hours     Cardiology will follow.     Peripheral IV 11/29/23 20 G Right;Anterior Forearm (Active)   Site Assessment Clean;Dry;Intact 12/01/23 0750   Dressing Type Transparent 12/01/23 0750   Line Status Flushed 12/01/23 0750   Dressing Status Clean;Dry 12/01/23 0750   Number of days: 2       Code Status:  Full Code          Gene Hermosillo MD

## 2023-12-01 NOTE — NURSING NOTE
Update Dr. Hermosillo that Pt's SBP remains <100 sustaining. Metoprolols were held last night and AM dose of metoprolol 37.5mg PO was given this morning. BP remains low but stable. Pt remains asymptomatic from low BP. Clarify new order of metoprolol 50mg Q6H with Dr. Hermosillo. Dose is decrease back to 37.5mg PO Q6H. Administer the metoprolol per order per Dr. Hermosillo. Continue monitoring.

## 2023-12-01 NOTE — PROGRESS NOTES
Mandie Arevalo is a 48 y.o. female on day 2 of admission presenting with Spontaneous dissection of coronary artery.    Review of Systems   Constitutional:  Negative for chills and fever.   Eyes:  Negative for photophobia and visual disturbance.   Respiratory:  Negative for cough and shortness of breath.    Cardiovascular:  Negative for chest pain, palpitations and leg swelling.   Gastrointestinal:  Negative for abdominal pain, blood in stool, nausea and vomiting.   Endocrine: Negative for polydipsia and polyuria.   Genitourinary:  Negative for decreased urine volume, dysuria, hematuria and urgency.   Skin:  Negative for color change and rash.   Neurological:  Negative for dizziness, tremors, syncope, weakness and light-headedness.   Psychiatric/Behavioral:  Negative for confusion and sleep disturbance.    All other systems reviewed and are negative.     Subjective   Mandie Arevalo is a 48 y.o.  female female with a past medical history significant for with no significant past medical history.  Patient was recently admitted from 11/25/2023 till 11/27/2023 with concerns for with elevated troponins which was initially at 121 and then peaked at 1334 and has been downtrending since to about 450.  A LHC was performed which showed nonobstructive CAD with LV gram showing hyperdynamic base with akinetic/dyskinetic apex which may be suggestive of stress-induced/Takotsubo's cardiomyopathy as well as a possibly occluded LAD at some point and at that time of angiography the LAD had recanalized as there was mention of wall motion abnormalities.  The distal LAD had diffuse disease which can be seen in patients with scad and may represent type III scad with recommendations to manage conservatively with DAPT as well as statin therapy as well as beta-blocker therapy.  Patient was discharged on 11/27/2023 and was asymptomatic without any concerns with need for close outpatient follow-up with cardiology and cardiac  rehab.     Patient states that this morning she developed very similar symptoms as when she had previously presented on 11/25/2023.  She stated that it awoke her from sleep and progressively worsened until presentation and has persisted since presentation with only mild to maybe moderate improvement in her symptoms.  She stated that she had developed sudden onset 10 out of 10 radiating sharp/pressure-like stabbing pain in her upper chest wall radiating into bilateral arms and into her neck as well as upper back.  She had also developed complained of numbness and tingling in her upper extremities as well as into her upper back as well as in her anterior chest.  She stated that the only thing that had changed with regards to the symptoms was she did not have any nausea during the episode prior to presentation today.  Since discharge she denied any recent sick contacts, chemical/environmental exposures, changes in dietary habits or any recent traumatic events/falls other than noted above.  She denies any fevers, chills, night sweats, vision changes, auditory changes, change in taste/smell, loss of bowel/bladder control, loss consciousness, dizziness, vertigo, syncope, seizure-like activity, palpitations, shortness of breath, coughing, wheezing, congestion, hemoptysis, hematemesis, abdominal pain, nausea, vomiting, diarrhea, constipation, dysuria, hematuria, dyschezia, hematochezia or any lateralizing motor/sensory deficits other than noted above.    11/29: Patient seen.  Troponin has increased again.  Patient was complaining of chest pain earlier but better with repeat troponin to 60 Percocet.  With rising troponin 323 spoke with cardiology.  Recommend starting on heparin and Isordil.  Will arrange for cardiac catheterization tomorrow.  Patient with known Takotsubo.  Suspect medical management.  Possibly coronary vasospasm.  Currently chest pain-free.    11/30: Patient seen. Troponin increased this morning to 8570  compared to 5818 yesterday. Cardiology evaluated the patient yesterday after troponin diego from 450 to 5818, at that time per the cardiology note, the patient's chest pain significantly improved/nearly resolved. Patient was initiated on heparin infusion ACS protocol as well as isordil 10mg TID, and continued on ASA, clopidogrel, and statin. Metoprolol was held last night as the patient's blood pressure was decreasing and was as low as 98/61. Blood pressure today has been improving this morning, continue to monitor. Cardiac catheterization was preformed today, SCAD found in distal LAD, worsening compared to prior angiogram per cardiology documentation. No significant CAD was found elsewhere. Cardiology recommends medical management of SCAD, up titration of betablocker and isordil as tolerated. Current dose of metoprolol is 37.5 mg once daily. Patient has been moved to the ICU by cardiology after catheterization today to closely monitor blood pressure. Patient states she is doing fine after the procedure.   Discussed with cardiology.  Wishes to continue to monitor the patient over the next few days and ensure her enzymes trend downwards.  They will continue to titrate her medications as needed.    12/1: Patient seen. She is feeling better today after her heart catheterization yesterday. No shortness of breath or chest pain reported, but she does have a headache today. Patient remains in ICU as cardiology placed her there for 24 hours of monitoring after her procedure yesterday. Cardiology will continue metoprolol tartrate 37.5 mg every 6 hours until possible to up-titrate to 50 mg every 6 hours if tolerated. Blood pressure remains low but stable per nursing documentation. Continue to monitor blood pressure, cardiology will follow. Troponin has decreased from 8,570 on 11/29 to 4,296 today, will continue to trend as needed.        Objective     Last Recorded Vitals  BP 98/66   Pulse 79   Temp 36.6 °C (97.9 °F)   Resp  17   Wt 80.2 kg (176 lb 12.9 oz)   SpO2 94%   Intake/Output last 3 Shifts:    Intake/Output Summary (Last 24 hours) at 12/1/2023 0804  Last data filed at 11/30/2023 0957  Gross per 24 hour   Intake --   Output 5 ml   Net -5 ml         Admission Weight  Weight: 72.6 kg (160 lb) (11/29/23 0059)    Daily Weight  12/01/23 : 80.2 kg (176 lb 12.9 oz)      Physical Exam  Constitutional:       Appearance: Normal appearance. She is normal weight.   HENT:      Head: Normocephalic and atraumatic.      Nose: Nose normal. No congestion or rhinorrhea.      Mouth/Throat:      Mouth: Mucous membranes are dry.      Pharynx: Oropharynx is clear.   Eyes:      General: No scleral icterus.     Extraocular Movements: Extraocular movements intact.      Conjunctiva/sclera: Conjunctivae normal.      Pupils: Pupils are equal, round, and reactive to light.   Cardiovascular:      Rate and Rhythm: Normal rate and regular rhythm.      Pulses: Normal pulses.      Heart sounds: No murmur heard.     No gallop.   Pulmonary:      Effort: Pulmonary effort is normal.      Breath sounds: Normal breath sounds. No wheezing, rhonchi or rales.   Chest:      Chest wall: No tenderness.   Abdominal:      General: Abdomen is flat. There is no distension.      Palpations: Abdomen is soft.      Tenderness: There is no abdominal tenderness. There is no guarding or rebound.   Musculoskeletal:         General: No swelling, tenderness or signs of injury. Normal range of motion.   Skin:     General: Skin is warm and dry.      Coloration: Skin is not jaundiced.      Findings: No bruising, erythema or rash.   Neurological:      General: No focal deficit present.      Mental Status: She is alert and oriented to person, place, and time.      Cranial Nerves: No cranial nerve deficit.      Sensory: No sensory deficit.   Psychiatric:         Mood and Affect: Mood normal.         Behavior: Behavior normal.          Lab Results  Results for orders placed or performed during  the hospital encounter of 11/29/23 (from the past 24 hour(s))   ECG 12 Lead   Result Value Ref Range    Ventricular Rate 74 BPM    Atrial Rate 74 BPM    NH Interval 162 ms    QRS Duration 76 ms    QT Interval 378 ms    QTC Calculation(Bazett) 419 ms    P Axis 32 degrees    R Axis 49 degrees    T Axis 55 degrees    QRS Count 12 beats    Q Onset 223 ms    P Onset 142 ms    P Offset 192 ms    T Offset 412 ms    QTC Fredericia 405 ms   Electrocardiogram 12 Lead   Result Value Ref Range    Ventricular Rate 80 BPM    Atrial Rate 80 BPM    NH Interval 158 ms    QRS Duration 74 ms    QT Interval 360 ms    QTC Calculation(Bazett) 415 ms    P Axis 28 degrees    R Axis 47 degrees    T Axis 50 degrees    QRS Count 13 beats    Q Onset 222 ms    P Onset 143 ms    P Offset 199 ms    T Offset 402 ms    QTC Fredericia 396 ms   Renal Function Panel   Result Value Ref Range    Glucose 96 74 - 99 mg/dL    Sodium 135 (L) 136 - 145 mmol/L    Potassium 3.9 3.5 - 5.3 mmol/L    Chloride 107 98 - 107 mmol/L    Bicarbonate 23 21 - 32 mmol/L    Anion Gap 9 (L) 10 - 20 mmol/L    Urea Nitrogen 8 6 - 23 mg/dL    Creatinine 0.83 0.50 - 1.05 mg/dL    eGFR 87 >60 mL/min/1.73m*2    Calcium 8.5 (L) 8.6 - 10.3 mg/dL    Phosphorus 3.0 2.5 - 4.9 mg/dL    Albumin 3.7 3.4 - 5.0 g/dL        Image Results  Electrocardiogram 12 Lead  Normal sinus rhythm  Nonspecific ST and T wave abnormality  Abnormal ECG  No previous ECGs available  Confirmed by Noe Jo [id number]) on 11/30/2023 4:52:26 PM  ECG 12 Lead  Normal sinus rhythm  Nonspecific ST and T wave abnormality  Abnormal ECG  When compared with ECG of 29-NOV-2023 20:40,  No significant change was found  Confirmed by Noe Jo [id number]) on 11/30/2023 2:08:53 PM  ECG 12 Lead  Normal sinus rhythm  Possible Anterior infarct , age undetermined  Abnormal ECG  When compared with ECG of 29-NOV-2023 01:06,  PREVIOUS ECG IS PRESENT  Confirmed by Travis Ferrer ([id number]) on 11/30/2023 7:57:35 AM  ECG 12 lead  Normal sinus  rhythm  Nonspecific ST and T wave abnormality  Minimal ST elevation in the anterolateral and inferior leads - does not meet strict STEMI criteria  Abnormal ECG  When compared with ECG of 29-NOV-2023 16:03, (unconfirmed)  No significant change was found  Confirmed by Travis Ferrer ([id number]) on 11/30/2023 7:57:05 AM  Cardiac catheterization - [facility], Cath Lab  [address] [place] [address]     Phone [phone] Fax [phone]    Cardiovascular Catheterization Report    Patient Name:     LOLA FORTUNE Performing           [id number] Gene Rodriguez                                        Physician:           MD  Study Date:       11/25/2023          Verifying Physician: [id number] Rodriguez MD  MRN/PID:          [id number]            Cardiologist:  Accession#:       [id number]        Ordering Provider:   [id number] GENE RODRIGUEZ  Date of           1975 / 48      Fellow:  Birth/Age:        years  Gender:           F                   Fellow:  Encounter#:       [id number]       Study: Left Heart Cath       Indications:  LOLA FORTUNE is a 49 year old female who presents with dyslipidemia and a chest pain assessment of atypical angina. NSTE - ACS.     Appropriate Use Criteria:  Non ST elevation myocardial infarction with high risk score; AUC score = 9.     Procedure Description:  After infiltration with 2% Lidocaine, the right radial artery was cannulated with a modified Seldinger technique. Subsequently a 6 Estonian sheath was placed retrograde in the right radial artery. Selective coronary catheterization was performed using a 6 Fr catheter(s) exchanged over a guide wire to cannulate the coronary arteries. A IKARI 3.5 tip catheter was used for left coronary injections. A TIG 4 tip catheter was used for right coronary injections.  Multiple injections of contrast were made into the left and right coronary arteries  with angiograms recorded in multiple projections. After completion of the procedure, the arterial sheath was pulled and a TR Band Radial Compression Device was utilized to obtain patent hemostasis.     Coronary Angiography:  The coronary circulation is co-dominant.     Left Main Coronary Artery:  The left main coronary artery is a normal caliber vessel. The left main arises normally from the left coronary sinus of Valsalva. The left main coronary artery showed no significant disease or stenosis greater than 30%.     Left Anterior Descending Coronary Artery Distribution:  The left anterior descending coronary artery is a normal caliber vessel. The LAD arises normally from the left main coronary artery. The LAD demonstrated severe tortuosity. There is no significant stenosis in the proximal LAD.. Distal LAD has area of diffuse narrowing which can represent SCAD.     Circumflex Coronary Artery Distribution:  The circumflex coronary artery is a normal caliber vessel. The circumflex arises normally from the left main coronary artery. The circumflex revealed no significant disease or stenosis greater than 30%. The 1st obtuse marginal branch is a medium-sized caliber vessel. The 1st obtuse marginal branch showed no significant disease or stenosis greater than 30%. The 2nd obtuse marginal branch is a medium-sized caliber vessel. The 2nd obtuse marginal branch demonstrated no significant disease or stenosis greater than 30%.     Right Coronary Artery Distribution:    The right coronary artery is a normal caliber vessel. The RCA arises normally from the right sinus of Valsalva. The RCA showed no significant disease or stenosis greater than 30%.       Left Ventriculography:  The size of the left ventricle is normal. Global left ventricular systolic function was normal. Dyskinesis of the apical LV regional wall segment is seen.     Hemo Personnel:  +-------------------+-------------+  Name               Duty            +-------------------+-------------+  Gene Hermosillo MD, MD 1  +-------------------+-------------+  Blossom GomezROC RECORD 1  +-------------------+-------------+       Hemodynamic Pressures:     +----+---------------+----------+-------------+--------------+-------+---------+  Site   Date Time   Phase NameSystolic mmHgDiastolic mmHgED mmHgMean mmHg  +----+---------------+----------+-------------+--------------+-------+---------+    AO     11/25/2023  AIR REST          127            85             106           3:24:18 PM                                                       +----+---------------+----------+-------------+--------------+-------+---------+    LV     11/25/2023  AIR REST          106             6     18                    4:01:12 PM                                                       +----+---------------+----------+-------------+--------------+-------+---------+    LV     11/25/2023  AIR REST           94             5     17                    4:03:38 PM                                                       +----+---------------+----------+-------------+--------------+-------+---------+   LVp     11/25/2023  AIR REST          111             7     18                    4:03:45 PM                                                       +----+---------------+----------+-------------+--------------+-------+---------+   AOp     11/25/2023  AIR REST          105            66              85           4:03:52 PM                                                       +----+---------------+----------+-------------+--------------+-------+---------+         Oxygen Saturation %:  +-----------+------------+  Sample SiteHB (g/100ml)  +-----------+------------+      SYS ART        13.4  +-----------+------------+      SYS CELESTINO        13.4  +-----------+------------+      PUL ART         13.4  +-----------+------------+      PUL CELESTINO        13.4  +-----------+------------+       Complications:  No in-lab complications observed.     Cardiac Cath Post Procedure Notes:  Post Procedure Diagnosis: SCAD.  Blood Loss:               Estimated blood loss during the procedure was 10 mls.  Specimens Removed:        Number of specimen(s) removed: none.       Recommendations:  Maximize medical therapy.  Agressive risk factor modification efforts.  Telemetry monitoring.  Follow-up with cardiology clinic.  Will refer to vascular medicine for evaluation of SCAD/FMD.  Will recommend DAPT for one month, followed by ASA 81 mg daily.  Consider referral to cardiac rehabilitation.    ____________________________________________________________________________________  CONCLUSIONS:   1. There is diffuse area of narrowing in distal LAD. This may represent spontaneous coronary dissection.   2. Non obstructive CAD elsewhere as specified above.   3. Apical dyskinesis on LVgram with overall hyperdynamic LV function.   4. Left Ventricular end-diastolic pressure = 18 mmHg.    ICD 10 Codes:  Non ST elevation (NSTEMI) myocardial infarction-I21.4     CPT Codes:  Left Heart Cath (visualization of coronaries) and LV-10696; Moderate Sedation Services 1st additional 15 minutes patient >5 years-50430; Moderate Sedation Services 2nd additional 15 minutes patient >5 years-95784; Moderate Sedation Services 3rd additional 15 minutes patient >5 years-62635     [id number] Gene Hermosillo MD  Performing Physician  Electronically signed by [id number] Gene Hermosillo MD on 11/29/2023 at 1:08:43 PM         ** Final **       Assessment/Plan     Spontaneous dissection of coronary artery  Known history of Takotsubo cardiomyopathy.  Continue current medication for pain.  Started on heparin infusion and added Isordil. Heparin infusion completed, on ASA and Plavix.   Discussed with cardiology, heart catheterization procedure completed yesterday.  SCAD in distal  LV, cardiology recommends medical management   We will continue to monitor patient in TCU.  Cardiology will continue adjust medications.    NSTEMI (non-ST elevated myocardial infarction) (CMS/[facility])  See elsewhere                   Jolene Zhu    Pt seen and examined  with my PA student . I have modified the note to reflect my documentation of HPI and assessment and plan.    Patient seen.  No chest pain.  Blood pressures are been somewhat low.  Discussed with cardiology.  Continue to adjust medications.  Consider discharge on 12/3.  We will continue to monitor in TCU for now.    Vadim Goldman MD

## 2023-12-01 NOTE — NURSING NOTE
Pt walks around the unit back and forth X1. Then pt sits in chair. No JAMES noted. Pt denies any discomforts. Continue monitoring.

## 2023-12-02 PROCEDURE — 2500000001 HC RX 250 WO HCPCS SELF ADMINISTERED DRUGS (ALT 637 FOR MEDICARE OP): Performed by: NURSE PRACTITIONER

## 2023-12-02 PROCEDURE — 2020000001 HC ICU ROOM DAILY

## 2023-12-02 PROCEDURE — 99232 SBSQ HOSP IP/OBS MODERATE 35: CPT | Performed by: PHYSICIAN ASSISTANT

## 2023-12-02 PROCEDURE — 2500000005 HC RX 250 GENERAL PHARMACY W/O HCPCS: Performed by: NURSE PRACTITIONER

## 2023-12-02 PROCEDURE — 99233 SBSQ HOSP IP/OBS HIGH 50: CPT | Performed by: INTERNAL MEDICINE

## 2023-12-02 RX ORDER — METOPROLOL TARTRATE 25 MG/1
25 TABLET, FILM COATED ORAL 2 TIMES DAILY
Status: DISCONTINUED | OUTPATIENT
Start: 2023-12-02 | End: 2023-12-03 | Stop reason: HOSPADM

## 2023-12-02 RX ORDER — METOPROLOL TARTRATE 25 MG/1
25 TABLET, FILM COATED ORAL EVERY 6 HOURS
Status: DISCONTINUED | OUTPATIENT
Start: 2023-12-02 | End: 2023-12-02

## 2023-12-02 RX ADMIN — ISOSORBIDE DINITRATE 10 MG: 10 TABLET ORAL at 15:43

## 2023-12-02 RX ADMIN — ISOSORBIDE DINITRATE 10 MG: 10 TABLET ORAL at 10:28

## 2023-12-02 RX ADMIN — ACETAMINOPHEN 650 MG: 325 TABLET ORAL at 15:43

## 2023-12-02 RX ADMIN — CLOPIDOGREL BISULFATE 75 MG: 75 TABLET, FILM COATED ORAL at 08:51

## 2023-12-02 RX ADMIN — ISOSORBIDE DINITRATE 10 MG: 10 TABLET ORAL at 21:11

## 2023-12-02 RX ADMIN — ACETAMINOPHEN 650 MG: 325 TABLET ORAL at 21:11

## 2023-12-02 RX ADMIN — ATORVASTATIN CALCIUM 40 MG: 40 TABLET, FILM COATED ORAL at 21:11

## 2023-12-02 RX ADMIN — ASPIRIN 81 MG CHEWABLE TABLET 81 MG: 81 TABLET CHEWABLE at 08:51

## 2023-12-02 RX ADMIN — LIDOCAINE 1 PATCH: 4 PATCH TOPICAL at 08:51

## 2023-12-02 RX ADMIN — ACETAMINOPHEN 650 MG: 325 TABLET ORAL at 10:31

## 2023-12-02 ASSESSMENT — PAIN SCALES - GENERAL
PAINLEVEL_OUTOF10: 0 - NO PAIN
PAINLEVEL_OUTOF10: 3
PAINLEVEL_OUTOF10: 0 - NO PAIN

## 2023-12-02 ASSESSMENT — COGNITIVE AND FUNCTIONAL STATUS - GENERAL
DAILY ACTIVITIY SCORE: 24
MOBILITY SCORE: 24
MOBILITY SCORE: 24
DAILY ACTIVITIY SCORE: 24
MOBILITY SCORE: 24
DAILY ACTIVITIY SCORE: 24

## 2023-12-02 ASSESSMENT — PAIN - FUNCTIONAL ASSESSMENT
PAIN_FUNCTIONAL_ASSESSMENT: 0-10

## 2023-12-02 ASSESSMENT — ENCOUNTER SYMPTOMS
TREMORS: 0
SLEEP DISTURBANCE: 0
FEVER: 0
HEMATURIA: 0
NAUSEA: 0
POLYDIPSIA: 0
DIZZINESS: 0
VOMITING: 0
CHILLS: 0
LIGHT-HEADEDNESS: 0
SHORTNESS OF BREATH: 0
DYSURIA: 0
BLOOD IN STOOL: 0
COLOR CHANGE: 0
COUGH: 0
PALPITATIONS: 0
PHOTOPHOBIA: 0
WEAKNESS: 0
ABDOMINAL PAIN: 0
CONFUSION: 0

## 2023-12-02 NOTE — CARE PLAN
The patient's goals for the shift include  no chest pain     The clinical goals for the shift include Pt will remain hemodynamically stable this shift    Over the shift, the patient denied any chest pain and ambulated with staff assist multiple times

## 2023-12-02 NOTE — CARE PLAN
Problem: ACS/CP/NSTEMI/STEMI  Goal: Lab values return to normal range  Outcome: Progressing  Goal: Verbalize understanding of procedures/devices  Outcome: Progressing     Problem: Pain - Adult  Goal: Verbalizes/displays adequate comfort level or baseline comfort level  Outcome: Progressing     Problem: Discharge Planning  Goal: Discharge to home or other facility with appropriate resources  Outcome: Progressing     Problem: ACS/CP/NSTEMI/STEMI  Goal: Chest pain managed (free from pain or at acceptable level)  Outcome: Met     Problem: Safety - Adult  Goal: Free from fall injury  Outcome: Met

## 2023-12-02 NOTE — PROGRESS NOTES
Subjective Data:  Patient reports that her chest pain is resolved at this time.  BP is soft. Overall tolerating metoprolol and isordil.   12-2-23: Patient resting comfortably with family at bedside.  No recurrent chest discomfort.  Having headaches with the Isordil.  Blood pressures have been on the low side but she is asymptomatic.  We will change the metoprolol dosing to just twice daily with hold parameter if systolic blood pressures less than 90.  She will otherwise continue with the Isordil and if need be take Tylenol for the headache.  Again no further chest discomfort.  Troponin levels have come down significantly.    Overnight Events:    None     Objective Data:  Last Recorded Vitals:  Vitals:    12/02/23 1023 12/02/23 1024 12/02/23 1025 12/02/23 1032   BP:    99/64   Pulse:       Resp:       Temp:       TempSrc:       SpO2: 96% 98% 96% 97%   Weight:       Height:           Last Labs:  CBC - 11/30/2023:  4:36 AM  13.6 12.1 261    35.0      CMP - 12/1/2023:  5:26 AM  8.5 7.1 13 --- 0.3   3.0 3.7 10 62      PTT - 11/29/2023:  1:11 AM  1.0   11.8 34          Last I/O:  I/O last 3 completed shifts:  In: 480 (6.1 mL/kg) [P.O.:480]  Out: 0 (0 mL/kg)   Weight: 78.9 kg     Past Cardiology Tests (Last 3 Years):  EKG:  Electrocardiogram 12 Lead 11/30/2023      ECG 12 Lead 11/30/2023      ECG 12 Lead 11/30/2023      ECG 12 lead 11/30/2023      ECG 12 Lead       ECG 12 Lead       ECG 12 Lead     Echo:  Transthoracic Echo (TTE) Limited 11/29/2023 11/29/2023  PHYSICIAN INTERPRETATION:  Left Ventricle: Left ventricular systolic function is low normal, with an estimated ejection fraction of 50-55%. Wall motion is abnormal. The left ventricular cavity size is normal. Left ventricular diastolic filling was not assessed.  LV Wall Scoring:  The apical septal segment, apical anterior segment, and apex are akinetic. The  apical inferior segment is hypokinetic. All remaining scored segments are  normal.  CONCLUSIONS:   1. Left  ventricular systolic function is low normal with a 50-55% estimated ejection fraction.   2. Apical septal segment, apical anterior segment, apex, and apical inferior segment are abnormal.     Cath:  Cardiac catheterization - coronary 11/30/2023      Inpatient Medications:  Scheduled medications   Medication Dose Route Frequency    aspirin  81 mg oral Daily    atorvastatin  40 mg oral Nightly    clopidogrel  75 mg oral Daily    isosorbide dinitrate  10 mg oral TID    lidocaine  1 patch transdermal Daily    metoprolol tartrate  25 mg oral BID       Physical Exam:  General: Alert and Oriented, No distress, cooperative  Head: Normocephalic without obvious abnormality, atraumatic  Eyes: Conjunctiva/corneas clear, EOM's grossly intact  Neck: Supple, trachea midline, No thyroid enlargement/tenderness/nodules; No JVD  Lungs: Clear to auscultation bilaterally, no wheezes, rhonci, or rales. respirations unlabored  Chest Wall: No tenderness or deformity  Heart: Regular rhythm, normal S1/S2, no murmur  Abdomen: Soft, non-tender, Non-distended, bowel sounds active  Extremities: No edema, no cyanosis, no clubbing  Skin: Skin color, texture, turgor normal.  No rashes or lesions noted;  R femoral access unremarkable  Neurologic: Alert and oriented x 3, grossly moving all extremities, speech intact     Assessment/Plan   -NSTEMI  -Spontaneous coronary dissection     Plan:  -Left heart cath was performed yesterday that showed spontaneous coronary artery dissection in the distal LAD which is worse when compared to the prior coronary angiography done on 11/25.  There is no proximal extension of the dissection.  -We will continue medical management at this point.  -We will continue aspirin and Plavix.  -We will continue Isordil 10 mg 3 times daily as previously taking  -We will continue metoprolol tartrate 37.5 mg every 6 hours. Uptitrate to 50 mg Q6H if tolerated. Hold if SBP <90 or DBP < 50 mmHg.   -We will continue statin therapy as  previously taking  -Check troponin until downtrending.  -Monitor in the ICU for next 24 hours  -Will need to remain inpatient for 24-48 hours   12-2-23: Patient resting comfortably and has not had further chest discomfort.  I have adjusted her metoprolol dosing as she has not received doses due to lower blood pressures and I will change the hold parameter for systolic pressure less than 90.  She will continue the Isordil and use Tylenol as necessary for headache.  For now continue antiplatelet medication.  We will observe another 24 hours and hopefully home on Sunday.  Family had no questions        Fabián Garcia PA-C  12/2/2023  11:27 AM

## 2023-12-02 NOTE — PROGRESS NOTES
Mandie Arevalo is a 48 y.o. female on day 3 of admission presenting with Spontaneous dissection of coronary artery.    Review of Systems   Constitutional:  Negative for chills and fever.   Eyes:  Negative for photophobia and visual disturbance.   Respiratory:  Negative for cough and shortness of breath.    Cardiovascular:  Negative for chest pain, palpitations and leg swelling.   Gastrointestinal:  Negative for abdominal pain, blood in stool, nausea and vomiting.   Endocrine: Negative for polydipsia and polyuria.   Genitourinary:  Negative for decreased urine volume, dysuria, hematuria and urgency.   Skin:  Negative for color change and rash.   Neurological:  Negative for dizziness, tremors, syncope, weakness and light-headedness.   Psychiatric/Behavioral:  Negative for confusion and sleep disturbance.    All other systems reviewed and are negative.     Subjective   Mandie Arevalo is a 48 y.o.  female female with a past medical history significant for with no significant past medical history.  Patient was recently admitted from 11/25/2023 till 11/27/2023 with concerns for with elevated troponins which was initially at 121 and then peaked at 1334 and has been downtrending since to about 450.  A LHC was performed which showed nonobstructive CAD with LV gram showing hyperdynamic base with akinetic/dyskinetic apex which may be suggestive of stress-induced/Takotsubo's cardiomyopathy as well as a possibly occluded LAD at some point and at that time of angiography the LAD had recanalized as there was mention of wall motion abnormalities.  The distal LAD had diffuse disease which can be seen in patients with scad and may represent type III scad with recommendations to manage conservatively with DAPT as well as statin therapy as well as beta-blocker therapy.  Patient was discharged on 11/27/2023 and was asymptomatic without any concerns with need for close outpatient follow-up with cardiology and cardiac  rehab.     Patient states that this morning she developed very similar symptoms as when she had previously presented on 11/25/2023.  She stated that it awoke her from sleep and progressively worsened until presentation and has persisted since presentation with only mild to maybe moderate improvement in her symptoms.  She stated that she had developed sudden onset 10 out of 10 radiating sharp/pressure-like stabbing pain in her upper chest wall radiating into bilateral arms and into her neck as well as upper back.  She had also developed complained of numbness and tingling in her upper extremities as well as into her upper back as well as in her anterior chest.  She stated that the only thing that had changed with regards to the symptoms was she did not have any nausea during the episode prior to presentation today.  Since discharge she denied any recent sick contacts, chemical/environmental exposures, changes in dietary habits or any recent traumatic events/falls other than noted above.  She denies any fevers, chills, night sweats, vision changes, auditory changes, change in taste/smell, loss of bowel/bladder control, loss consciousness, dizziness, vertigo, syncope, seizure-like activity, palpitations, shortness of breath, coughing, wheezing, congestion, hemoptysis, hematemesis, abdominal pain, nausea, vomiting, diarrhea, constipation, dysuria, hematuria, dyschezia, hematochezia or any lateralizing motor/sensory deficits other than noted above.    11/29: Patient seen.  Troponin has increased again.  Patient was complaining of chest pain earlier but better with repeat troponin to 60 Percocet.  With rising troponin 323 spoke with cardiology.  Recommend starting on heparin and Isordil.  Will arrange for cardiac catheterization tomorrow.  Patient with known Takotsubo.  Suspect medical management.  Possibly coronary vasospasm.  Currently chest pain-free.    11/30: Patient seen. Troponin increased this morning to 8570  compared to 5818 yesterday. Cardiology evaluated the patient yesterday after troponin diego from 450 to 5818, at that time per the cardiology note, the patient's chest pain significantly improved/nearly resolved. Patient was initiated on heparin infusion ACS protocol as well as isordil 10mg TID, and continued on ASA, clopidogrel, and statin. Metoprolol was held last night as the patient's blood pressure was decreasing and was as low as 98/61. Blood pressure today has been improving this morning, continue to monitor. Cardiac catheterization was preformed today, SCAD found in distal LAD, worsening compared to prior angiogram per cardiology documentation. No significant CAD was found elsewhere. Cardiology recommends medical management of SCAD, up titration of betablocker and isordil as tolerated. Current dose of metoprolol is 37.5 mg once daily. Patient has been moved to the ICU by cardiology after catheterization today to closely monitor blood pressure. Patient states she is doing fine after the procedure.   Discussed with cardiology.  Wishes to continue to monitor the patient over the next few days and ensure her enzymes trend downwards.  They will continue to titrate her medications as needed.    12/1: Patient seen. She is feeling better today after her heart catheterization yesterday. No shortness of breath or chest pain reported, but she does have a headache today. Patient remains in ICU as cardiology placed her there for 24 hours of monitoring after her procedure yesterday. Cardiology will continue metoprolol tartrate 37.5 mg every 6 hours until possible to up-titrate to 50 mg every 6 hours if tolerated. Blood pressure remains low but stable per nursing documentation. Continue to monitor blood pressure, cardiology will follow. Troponin has decreased from 8,570 on 11/29 to 4,296 today, will continue to trend as needed.    12/2: Patient seen.  No chest pain or shortness of breath at present however patient's blood  pressure is running borderline low.  Appreciate cardiology input.  Hopefully can discharge tomorrow.  We will continue on TCU due to low blood pressure.  Patient herself is largely asymptomatic.  Reassess in a.m.       Objective     Last Recorded Vitals  BP 97/57   Pulse 81   Temp 37.1 °C (98.8 °F)   Resp (!) 3   Wt 78.9 kg (173 lb 15.1 oz)   SpO2 95%   Intake/Output last 3 Shifts:    Intake/Output Summary (Last 24 hours) at 12/2/2023 1828  Last data filed at 12/2/2023 0600  Gross per 24 hour   Intake --   Output 0 ml   Net 0 ml       Admission Weight  Weight: 72.6 kg (160 lb) (11/29/23 0059)    Daily Weight  12/02/23 : 78.9 kg (173 lb 15.1 oz)      Physical Exam  Constitutional:       Appearance: Normal appearance. She is normal weight.   HENT:      Head: Normocephalic and atraumatic.      Nose: Nose normal. No congestion or rhinorrhea.      Mouth/Throat:      Mouth: Mucous membranes are dry.      Pharynx: Oropharynx is clear.   Eyes:      General: No scleral icterus.     Extraocular Movements: Extraocular movements intact.      Conjunctiva/sclera: Conjunctivae normal.      Pupils: Pupils are equal, round, and reactive to light.   Cardiovascular:      Rate and Rhythm: Normal rate and regular rhythm.      Pulses: Normal pulses.      Heart sounds: No murmur heard.     No gallop.   Pulmonary:      Effort: Pulmonary effort is normal.      Breath sounds: Normal breath sounds. No wheezing, rhonchi or rales.   Chest:      Chest wall: No tenderness.   Abdominal:      General: Abdomen is flat. There is no distension.      Palpations: Abdomen is soft.      Tenderness: There is no abdominal tenderness. There is no guarding or rebound.   Musculoskeletal:         General: No swelling, tenderness or signs of injury. Normal range of motion.   Skin:     General: Skin is warm and dry.      Coloration: Skin is not jaundiced.      Findings: No bruising, erythema or rash.   Neurological:      General: No focal deficit present.       Mental Status: She is alert and oriented to person, place, and time.      Cranial Nerves: No cranial nerve deficit.      Sensory: No sensory deficit.   Psychiatric:         Mood and Affect: Mood normal.         Behavior: Behavior normal.          Lab Results  No results found for this or any previous visit (from the past 24 hour(s)).       Image Results  Electrocardiogram 12 Lead  Normal sinus rhythm  Nonspecific ST and T wave abnormality  Abnormal ECG  No previous ECGs available  Confirmed by Noe Jo ([id number]) on 11/30/2023 4:52:26 PM  ECG 12 Lead  Normal sinus rhythm  Nonspecific ST and T wave abnormality  Abnormal ECG  When compared with ECG of 29-NOV-2023 20:40,  No significant change was found  Confirmed by Noe Jo [id number]) on 11/30/2023 2:08:53 PM  ECG 12 Lead  Normal sinus rhythm  Possible Anterior infarct , age undetermined  Abnormal ECG  When compared with ECG of 29-NOV-2023 01:06,  PREVIOUS ECG IS PRESENT  Confirmed by Travis Ferrer ([id number]) on 11/30/2023 7:57:35 AM  ECG 12 lead  Normal sinus rhythm  Nonspecific ST and T wave abnormality  Minimal ST elevation in the anterolateral and inferior leads - does not meet strict STEMI criteria  Abnormal ECG  When compared with ECG of 29-NOV-2023 16:03, (unconfirmed)  No significant change was found  Confirmed by Travis Ferrer ([id number]) on 11/30/2023 7:57:05 AM  Cardiac catheterization - [facility], Cath Lab  [address] [place] [address]     Phone [phone] Fax [phone]    Cardiovascular Catheterization Report    Patient Name:     LOLA FORTUNE Performing           [id number] Gene Hermosillo                                        Physician:           MD  Study Date:       11/25/2023          Verifying Physician: [id number] Hermosillo MD  MRN/PID:          [id number]            Cardiologist:  Accession#:       [id number]        Ordering Provider:   [id number]  ARELY RODRIGUEZ  Date of           1975 / 48      Fellow:  Birth/Age:        years  Gender:           F                   Fellow:  Encounter#:       [id number]       Study: Left Heart Cath       Indications:  LOLA FORTUNE is a 49 year old female who presents with dyslipidemia and a chest pain assessment of atypical angina. NSTE - ACS.     Appropriate Use Criteria:  Non ST elevation myocardial infarction with high risk score; AUC score = 9.     Procedure Description:  After infiltration with 2% Lidocaine, the right radial artery was cannulated with a modified Seldinger technique. Subsequently a 6 [place] citizen sheath was placed retrograde in the right radial artery. Selective coronary catheterization was performed using a 6 Fr catheter(s) exchanged over a guide wire to cannulate the coronary arteries. A IKARI 3.5 tip catheter was used for left coronary injections. A TIG 4 tip catheter was used for right coronary injections.  Multiple injections of contrast were made into the left and right coronary arteries with angiograms recorded in multiple projections. After completion of the procedure, the arterial sheath was pulled and a TR Band Radial Compression Device was utilized to obtain patent hemostasis.     Coronary Angiography:  The coronary circulation is co-dominant.     Left Main Coronary Artery:  The left main coronary artery is a normal caliber vessel. The left main arises normally from the left coronary sinus of Valsalva. The left main coronary artery showed no significant disease or stenosis greater than 30%.     Left Anterior Descending Coronary Artery Distribution:  The left anterior descending coronary artery is a normal caliber vessel. The LAD arises normally from the left main coronary artery. The LAD demonstrated severe tortuosity. There is no significant stenosis in the proximal LAD.. Distal LAD has area of diffuse narrowing which can represent SCAD.     Circumflex Coronary Artery Distribution:  The  circumflex coronary artery is a normal caliber vessel. The circumflex arises normally from the left main coronary artery. The circumflex revealed no significant disease or stenosis greater than 30%. The 1st obtuse marginal branch is a medium-sized caliber vessel. The 1st obtuse marginal branch showed no significant disease or stenosis greater than 30%. The 2nd obtuse marginal branch is a medium-sized caliber vessel. The 2nd obtuse marginal branch demonstrated no significant disease or stenosis greater than 30%.     Right Coronary Artery Distribution:    The right coronary artery is a normal caliber vessel. The RCA arises normally from the right sinus of Valsalva. The RCA showed no significant disease or stenosis greater than 30%.       Left Ventriculography:  The size of the left ventricle is normal. Global left ventricular systolic function was normal. Dyskinesis of the apical LV regional wall segment is seen.     Hemo Personnel:  +-------------------+-------------+  Name               Duty           +-------------------+-------------+  Gene Hermosillo MD, MD 1  +-------------------+-------------+  Blossom Gomez RNPROC RECORD 1  +-------------------+-------------+       Hemodynamic Pressures:     +----+---------------+----------+-------------+--------------+-------+---------+  Site   Date Time   Phase NameSystolic mmHgDiastolic mmHgED mmHgMean mmHg  +----+---------------+----------+-------------+--------------+-------+---------+    AO     11/25/2023  AIR REST          127            85             106           3:24:18 PM                                                       +----+---------------+----------+-------------+--------------+-------+---------+    LV     11/25/2023  AIR REST          106             6     18                    4:01:12 PM                                                        +----+---------------+----------+-------------+--------------+-------+---------+    LV     11/25/2023  AIR REST           94             5     17                    4:03:38 PM                                                       +----+---------------+----------+-------------+--------------+-------+---------+   LVp     11/25/2023  AIR REST          111             7     18                    4:03:45 PM                                                       +----+---------------+----------+-------------+--------------+-------+---------+   AOp     11/25/2023  AIR REST          105            66              85           4:03:52 PM                                                       +----+---------------+----------+-------------+--------------+-------+---------+         Oxygen Saturation %:  +-----------+------------+  Sample SiteHB (g/100ml)  +-----------+------------+      SYS ART        13.4  +-----------+------------+      SYS CELESTINO        13.4  +-----------+------------+      PUL ART        13.4  +-----------+------------+      PUL CELESTINO        13.4  +-----------+------------+       Complications:  No in-lab complications observed.     Cardiac Cath Post Procedure Notes:  Post Procedure Diagnosis: SCAD.  Blood Loss:               Estimated blood loss during the procedure was 10 mls.  Specimens Removed:        Number of specimen(s) removed: none.       Recommendations:  Maximize medical therapy.  Agressive risk factor modification efforts.  Telemetry monitoring.  Follow-up with cardiology clinic.  Will refer to vascular medicine for evaluation of SCAD/FMD.  Will recommend DAPT for one month, followed by ASA 81 mg daily.  Consider referral to cardiac rehabilitation.    ____________________________________________________________________________________  CONCLUSIONS:   1. There is diffuse area of narrowing in distal LAD. This may represent spontaneous  coronary dissection.   2. Non obstructive CAD elsewhere as specified above.   3. Apical dyskinesis on LVgram with overall hyperdynamic LV function.   4. Left Ventricular end-diastolic pressure = 18 mmHg.    ICD 10 Codes:  Non ST elevation (NSTEMI) myocardial infarction-I21.4     CPT Codes:  Left Heart Cath (visualization of coronaries) and LV-37076; Moderate Sedation Services 1st additional 15 minutes patient >5 years-91084; Moderate Sedation Services 2nd additional 15 minutes patient >5 years-[id number]; Moderate Sedation Services 3rd additional 15 minutes patient >5 years-20374     [id number] Gene Hermosillo MD  Performing Physician  Electronically signed by [id number] Gene Hermoisllo MD on 11/29/2023 at 1:08:43 PM         ** Final **       Assessment/Plan     Spontaneous dissection of coronary artery  Known history of Takotsubo cardiomyopathy.  Continue current medication for pain.  Started on heparin infusion and added Isordil. Heparin infusion completed, on ASA and Plavix.   Discussed with cardiology, heart catheterization procedure completed yesterday.  SCAD in distal LV, cardiology recommends medical management   Much improved on current medications.  Chest pain has resolved.  However blood patient's blood pressure remains low.  We will continue to monitor in TCU for now.  Possible discharge tomorrow pending cardiology's decisions.    NSTEMI (non-ST elevated myocardial infarction) (CMS/[facility])  See elsewhere                   Vadim Goldman MD

## 2023-12-03 ENCOUNTER — PHARMACY VISIT (OUTPATIENT)
Dept: PHARMACY | Facility: CLINIC | Age: 48
End: 2023-12-03
Payer: COMMERCIAL

## 2023-12-03 VITALS
WEIGHT: 173.94 LBS | SYSTOLIC BLOOD PRESSURE: 102 MMHG | HEART RATE: 81 BPM | TEMPERATURE: 98.6 F | BODY MASS INDEX: 30.82 KG/M2 | OXYGEN SATURATION: 97 % | DIASTOLIC BLOOD PRESSURE: 65 MMHG | RESPIRATION RATE: 3 BRPM | HEIGHT: 63 IN

## 2023-12-03 LAB
ANION GAP SERPL CALC-SCNC: 10 MMOL/L (ref 10–20)
BUN SERPL-MCNC: 14 MG/DL (ref 6–23)
CALCIUM SERPL-MCNC: 8.6 MG/DL (ref 8.6–10.3)
CHLORIDE SERPL-SCNC: 106 MMOL/L (ref 98–107)
CO2 SERPL-SCNC: 24 MMOL/L (ref 21–32)
CREAT SERPL-MCNC: 0.78 MG/DL (ref 0.5–1.05)
GFR SERPL CREATININE-BSD FRML MDRD: >90 ML/MIN/1.73M*2
GLUCOSE SERPL-MCNC: 94 MG/DL (ref 74–99)
MAGNESIUM SERPL-MCNC: 1.79 MG/DL (ref 1.6–2.4)
POTASSIUM SERPL-SCNC: 4.1 MMOL/L (ref 3.5–5.3)
SODIUM SERPL-SCNC: 136 MMOL/L (ref 136–145)

## 2023-12-03 PROCEDURE — 99232 SBSQ HOSP IP/OBS MODERATE 35: CPT | Performed by: PHYSICIAN ASSISTANT

## 2023-12-03 PROCEDURE — 2500000001 HC RX 250 WO HCPCS SELF ADMINISTERED DRUGS (ALT 637 FOR MEDICARE OP): Performed by: NURSE PRACTITIONER

## 2023-12-03 PROCEDURE — 2500000001 HC RX 250 WO HCPCS SELF ADMINISTERED DRUGS (ALT 637 FOR MEDICARE OP): Performed by: PHYSICIAN ASSISTANT

## 2023-12-03 PROCEDURE — 83735 ASSAY OF MAGNESIUM: CPT | Performed by: PHYSICIAN ASSISTANT

## 2023-12-03 PROCEDURE — 80048 BASIC METABOLIC PNL TOTAL CA: CPT | Performed by: PHYSICIAN ASSISTANT

## 2023-12-03 PROCEDURE — RXMED WILLOW AMBULATORY MEDICATION CHARGE

## 2023-12-03 PROCEDURE — 99239 HOSP IP/OBS DSCHRG MGMT >30: CPT | Performed by: INTERNAL MEDICINE

## 2023-12-03 PROCEDURE — 36415 COLL VENOUS BLD VENIPUNCTURE: CPT | Performed by: PHYSICIAN ASSISTANT

## 2023-12-03 RX ORDER — ISOSORBIDE DINITRATE 5 MG/1
5 TABLET ORAL
Qty: 90 TABLET | Refills: 0 | Status: SHIPPED | OUTPATIENT
Start: 2023-12-03 | End: 2023-12-15 | Stop reason: ALTCHOICE

## 2023-12-03 RX ORDER — ISOSORBIDE DINITRATE 10 MG/1
5 TABLET ORAL
Status: DISCONTINUED | OUTPATIENT
Start: 2023-12-03 | End: 2023-12-03 | Stop reason: HOSPADM

## 2023-12-03 RX ADMIN — ISOSORBIDE DINITRATE 5 MG: 10 TABLET ORAL at 08:40

## 2023-12-03 RX ADMIN — ASPIRIN 81 MG CHEWABLE TABLET 81 MG: 81 TABLET CHEWABLE at 08:39

## 2023-12-03 RX ADMIN — CLOPIDOGREL BISULFATE 75 MG: 75 TABLET, FILM COATED ORAL at 08:40

## 2023-12-03 RX ADMIN — METOPROLOL TARTRATE 25 MG: 25 TABLET, FILM COATED ORAL at 08:40

## 2023-12-03 ASSESSMENT — COGNITIVE AND FUNCTIONAL STATUS - GENERAL
DAILY ACTIVITIY SCORE: 24
MOBILITY SCORE: 24

## 2023-12-03 NOTE — DISCHARGE SUMMARY
Discharge Diagnosis  Spontaneous dissection of coronary artery    Issues Requiring Follow-Up  Follow-up with cardiology on Tuesday.  Monitor blood pressure.    Discharge Meds     Your medication list        START taking these medications        Instructions Last Dose Given Next Dose Due   isosorbide dinitrate 5 mg tablet  Commonly known as: Isordil      Take 1 tablet (5 mg) by mouth 3 times a day.              CONTINUE taking these medications        Instructions Last Dose Given Next Dose Due   aspirin 81 mg chewable tablet      Chew 1 tablet (81 mg) once daily. Do not start before November 28, 2023.       atorvastatin 40 mg tablet  Commonly known as: Lipitor      Take 1 tablet (40 mg) by mouth once daily at bedtime.       clopidogrel 75 mg tablet  Commonly known as: Plavix      Take 1 tablet (75 mg) by mouth once daily for 365 doses. Do not start before November 28, 2023.       metoprolol tartrate 25 mg tablet  Commonly known as: Lopressor      Take 1 tablet (25 mg) by mouth 2 times a day.       triamcinolone 0.1 % cream  Commonly known as: Kenalog                     Where to Get Your Medications        These medications were sent to [facility]  [address] [place], [place] [id number]      Hours: 8AM to 6PM Mon-Fri, 8AM to 2PM Sat, 8AM to 12PM Sun Phone: [phone]   isosorbide dinitrate 5 mg tablet         Test Results Pending At Discharge  Pending Labs       No current pending labs.            Hospital Course   Mandie Arevalo is a 48 y.o.  female female with a past medical history significant for with no significant past medical history.  Patient was recently admitted from 11/25/2023 till 11/27/2023 with concerns for with elevated troponins which was initially at 121 and then peaked at 1334 and has been downtrending since to about 450.  A LHC was performed which showed nonobstructive CAD with LV gram showing hyperdynamic base with akinetic/dyskinetic apex which may be suggestive of  stress-induced/Takotsubo's cardiomyopathy as well as a possibly occluded LAD at some point and at that time of angiography the LAD had recanalized as there was mention of wall motion abnormalities.  The distal LAD had diffuse disease which can be seen in patients with scad and may represent type III scad with recommendations to manage conservatively with DAPT as well as statin therapy as well as beta-blocker therapy.  Patient was discharged on 11/27/2023 and was asymptomatic without any concerns with need for close outpatient follow-up with cardiology and cardiac rehab.     Patient states that this morning she developed very similar symptoms as when she had previously presented on 11/25/2023.  She stated that it awoke her from sleep and progressively worsened until presentation and has persisted since presentation with only mild to maybe moderate improvement in her symptoms.  She stated that she had developed sudden onset 10 out of 10 radiating sharp/pressure-like stabbing pain in her upper chest wall radiating into bilateral arms and into her neck as well as upper back.  She had also developed complained of numbness and tingling in her upper extremities as well as into her upper back as well as in her anterior chest.  She stated that the only thing that had changed with regards to the symptoms was she did not have any nausea during the episode prior to presentation today.  Since discharge she denied any recent sick contacts, chemical/environmental exposures, changes in dietary habits or any recent traumatic events/falls other than noted above.  She denies any fevers, chills, night sweats, vision changes, auditory changes, change in taste/smell, loss of bowel/bladder control, loss consciousness, dizziness, vertigo, syncope, seizure-like activity, palpitations, shortness of breath, coughing, wheezing, congestion, hemoptysis, hematemesis, abdominal pain, nausea, vomiting, diarrhea, constipation, dysuria, hematuria,  dyschezia, hematochezia or any lateralizing motor/sensory deficits other than noted above.     11/29: Patient seen.  Troponin has increased again.  Patient was complaining of chest pain earlier but better with repeat troponin to 60 Percocet.  With rising troponin 323 spoke with cardiology.  Recommend starting on heparin and Isordil.  Will arrange for cardiac catheterization tomorrow.  Patient with known Takotsubo.  Suspect medical management.  Possibly coronary vasospasm.  Currently chest pain-free.     11/30: Patient seen. Troponin increased this morning to 8570 compared to 5818 yesterday. Cardiology evaluated the patient yesterday after troponin diego from 450 to 5818, at that time per the cardiology note, the patient's chest pain significantly improved/nearly resolved. Patient was initiated on heparin infusion ACS protocol as well as isordil 10mg TID, and continued on ASA, clopidogrel, and statin. Metoprolol was held last night as the patient's blood pressure was decreasing and was as low as 98/61. Blood pressure today has been improving this morning, continue to monitor. Cardiac catheterization was preformed today, SCAD found in distal LAD, worsening compared to prior angiogram per cardiology documentation. No significant CAD was found elsewhere. Cardiology recommends medical management of SCAD, up titration of betablocker and isordil as tolerated. Current dose of metoprolol is 37.5 mg once daily. Patient has been moved to the ICU by cardiology after catheterization today to closely monitor blood pressure. Patient states she is doing fine after the procedure.   Discussed with cardiology.  Wishes to continue to monitor the patient over the next few days and ensure her enzymes trend downwards.  They will continue to titrate her medications as needed.     12/1: Patient seen. She is feeling better today after her heart catheterization yesterday. No shortness of breath or chest pain reported, but she does have a  headache today. Patient remains in ICU as cardiology placed her there for 24 hours of monitoring after her procedure yesterday. Cardiology will continue metoprolol tartrate 37.5 mg every 6 hours until possible to up-titrate to 50 mg every 6 hours if tolerated. Blood pressure remains low but stable per nursing documentation. Continue to monitor blood pressure, cardiology will follow. Troponin has decreased from 8,570 on 11/29 to 4,296 today, will continue to trend as needed.     12/2: Patient seen.  No chest pain or shortness of breath at present however patient's blood pressure is running borderline low.  Appreciate cardiology input.  Hopefully can discharge tomorrow.  We will continue on TCU due to low blood pressure.  Patient herself is largely asymptomatic.  Reassess in a.m.    12/3: Patient seen.  No complaints.  Blood pressures have been running slightly low.  Isordil has been reduced.  We will go home on this medication and follow-up with cardiology in 2 days.  Scheduled as an outpatient.  No complaints at this time.  No chest pain or shortness of breath.  Follow-up with PCP 1 to 2 weeks.    This discharge took greater than 35 minutes to arrange.    Spontaneous dissection of coronary artery  Known history of Takotsubo cardiomyopathy.  Continue current medication for pain.  Started on heparin infusion and added Isordil. Heparin infusion completed, on ASA and Plavix.   Discussed with cardiology, heart catheterization procedure completed yesterday.  SCAD in distal LV, cardiology recommends medical management   No chest pain today. Started Isordil per cards but dose adjusted due to hypotension.  Discharge to home, follow with cards in 2 days as scheduled.  Continue other medications.    NSTEMI (non-ST elevated myocardial infarction) (CMS/HCC)  See elsewhere      Pertinent Physical Exam At Time of Discharge  Physical Exam  Constitutional:       Appearance: Normal appearance. She is normal weight.   HENT:      Head:  Normocephalic and atraumatic.      Nose: Nose normal. No congestion or rhinorrhea.      Mouth/Throat:      Mouth: Mucous membranes are dry.      Pharynx: Oropharynx is clear.   Eyes:      General: No scleral icterus.     Extraocular Movements: Extraocular movements intact.      Conjunctiva/sclera: Conjunctivae normal.      Pupils: Pupils are equal, round, and reactive to light.   Cardiovascular:      Rate and Rhythm: Normal rate and regular rhythm.      Pulses: Normal pulses.      Heart sounds: No murmur heard.     No gallop.   Pulmonary:      Effort: Pulmonary effort is normal.      Breath sounds: Normal breath sounds. No wheezing, rhonchi or rales.   Chest:      Chest wall: No tenderness.   Abdominal:      General: Abdomen is flat. There is no distension.      Palpations: Abdomen is soft.      Tenderness: There is no abdominal tenderness. There is no guarding or rebound.   Musculoskeletal:         General: No swelling, tenderness or signs of injury. Normal range of motion.   Skin:     General: Skin is warm and dry.      Coloration: Skin is not jaundiced.      Findings: No bruising, erythema or rash.   Neurological:      General: No focal deficit present.      Mental Status: She is alert and oriented to person, place, and time.      Cranial Nerves: No cranial nerve deficit.      Sensory: No sensory deficit.   Psychiatric:         Mood and Affect: Mood normal.         Behavior: Behavior normal.         Outpatient Follow-Up  Future Appointments   Date Time Provider Department [place]   12/5/2023  2:40 PM Sundeep MD [place] [id number] [facility]   12/7/2023  2:00 PM Bibiana Hudson MD [id number] [facility]   2/20/2024  8:30 AM Bibiana Hudson MD [id number] [facility]         Vadim Goldman MD

## 2023-12-03 NOTE — DISCHARGE INSTRUCTIONS
Follow up with PCP in 1-2 weeks. Call to schedule. Bring all your discharge paperwork and medications when you go to your follow up appointment. Return to ED if your symptoms come back.  Follow-up with cardiology as scheduled

## 2023-12-03 NOTE — NURSING NOTE
Dr. Galeano [facility] hospitalist informed via secure text of patients last two blood pressure readings of 79/45 and 74/44, patient currently sleeping.  No new orders obtained, will monitor vital signs for any changes.

## 2023-12-03 NOTE — CARE PLAN
The patient's goals for the shift include  maintain MAP above 60 and BP within acceptable limits for cardiology.    The clinical goals for the shift include Pt will remain hemodynamically stable this shift    Over the shift, the patient did not make progress toward the following goals. Barriers to progression include multiple low blood pressure reading. Recommendations to address these barriers include ensure next shift is aware.

## 2023-12-03 NOTE — PROGRESS NOTES
Subjective Data:  Patient reports that her chest pain is resolved at this time.  BP is soft. Overall tolerating metoprolol and isordil.   12-2-23: Patient resting comfortably with family at bedside.  No recurrent chest discomfort.  Having headaches with the Isordil.  Blood pressures have been on the low side but she is asymptomatic.  We will change the metoprolol dosing to just twice daily with hold parameter if systolic blood pressures less than 90.  She will otherwise continue with the Isordil and if need be take Tylenol for the headache.  Again no further chest discomfort.  Troponin levels have come down significantly.  12-3-23: Patient resting comfortably.  Denies any further chest discomfort.  Less headache with the isosordil.  Patient's blood pressures are persistently low.  We will down titrate the Isordil to 5 mg every 8 hours.  Continue on lower dose metoprolol.  She is stable for discharge from cardiac standpoint and has close follow-up with Dr. Hermosillo scheduled on Tuesday.    Overnight Events:    None     Objective Data:  Last Recorded Vitals:  Vitals:    12/03/23 0600 12/03/23 0700 12/03/23 0757 12/03/23 0800   BP: 89/56 101/58  102/65   Pulse:       Resp:       Temp:   37 °C (98.6 °F)    TempSrc:       SpO2: 96% 99%  97%   Weight:       Height:           Last Labs:  CBC - 11/30/2023:  4:36 AM  13.6 12.1 261    35.0      CMP - 12/3/2023:  4:48 AM  8.6 7.1 13 --- 0.3   3.0 3.7 10 62      PTT - 11/29/2023:  1:11 AM  1.0   11.8 34          Last I/O:  No intake/output data recorded.    Past Cardiology Tests (Last 3 Years):      Echo:  Transthoracic Echo (TTE) Limited 11/29/2023 11/29/2023  PHYSICIAN INTERPRETATION:  Left Ventricle: Left ventricular systolic function is low normal, with an estimated ejection fraction of 50-55%. Wall motion is abnormal. The left ventricular cavity size is normal. Left ventricular diastolic filling was not assessed.  LV Wall Scoring:  The apical septal segment, apical  anterior segment, and apex are akinetic. The  apical inferior segment is hypokinetic. All remaining scored segments are  normal.  CONCLUSIONS:   1. Left ventricular systolic function is low normal with a 50-55% estimated ejection fraction.   2. Apical septal segment, apical anterior segment, apex, and apical inferior segment are abnormal.     Cath:  Cardiac catheterization - coronary 11/30/2023      Inpatient Medications:  Scheduled medications   Medication Dose Route Frequency    aspirin  81 mg oral Daily    atorvastatin  40 mg oral Nightly    clopidogrel  75 mg oral Daily    isosorbide dinitrate  5 mg oral TID    lidocaine  1 patch transdermal Daily    metoprolol tartrate  25 mg oral BID       Physical Exam:  General: Alert and Oriented, No distress, cooperative  Head: Normocephalic without obvious abnormality, atraumatic  Eyes: Conjunctiva/corneas clear, EOM's grossly intact  Neck: Supple, trachea midline, No thyroid enlargement/tenderness/nodules; No JVD  Lungs: Clear to auscultation bilaterally, no wheezes, rhonci, or rales. respirations unlabored  Chest Wall: No tenderness or deformity  Heart: Regular rhythm, normal S1/S2, no murmur  Abdomen: Soft, non-tender, Non-distended, bowel sounds active  Extremities: No edema, no cyanosis, no clubbing  Skin: Skin color, texture, turgor normal.  No rashes or lesions noted;  R femoral access unremarkable  Neurologic: Alert and oriented x 3, grossly moving all extremities, speech intact     Assessment/Plan   -NSTEMI  -Spontaneous coronary dissection     Plan:  -Left heart cath was performed yesterday that showed spontaneous coronary artery dissection in the distal LAD which is worse when compared to the prior coronary angiography done on 11/25.  There is no proximal extension of the dissection.  -We will continue medical management at this point.  -We will continue aspirin and Plavix.  -We will continue Isordil 10 mg 3 times daily as previously taking  -We will continue  metoprolol tartrate 37.5 mg every 6 hours. Uptitrate to 50 mg Q6H if tolerated. Hold if SBP <90 or DBP < 50 mmHg.   -We will continue statin therapy as previously taking  -Check troponin until downtrending.  -Monitor in the ICU for next 24 hours  -Will need to remain inpatient for 24-48 hours   12-2-23: Patient resting comfortably and has not had further chest discomfort.  I have adjusted her metoprolol dosing as she has not received doses due to lower blood pressures and I will change the hold parameter for systolic pressure less than 90.  She will continue the Isordil and use Tylenol as necessary for headache.  For now continue antiplatelet medication.  We will observe another 24 hours and hopefully home on Sunday.  Family had no questions  12-3-23: Patient resting comfortably without recurrent chest discomfort.  Blood pressure still remain very low with low blood pressure recorded at 74/44.  Continue current dose of metoprolol but will reduce the Isordil to 5 mg every 8 hours.  Patient will continue with dual antiplatelet therapy and statin therapy.  Patient is advised if any recurrent chest pain return to the emergency department immediately otherwise she is stable from cardiac standpoint for discharge to home and again she has close scheduled follow-up with Dr. Hermosillo on Tuesday.        Fabián Garcia PA-C  12/3/2023  8:40 AM

## 2023-12-05 ENCOUNTER — APPOINTMENT (OUTPATIENT)
Dept: CARDIOLOGY | Facility: CLINIC | Age: 48
End: 2023-12-05
Payer: COMMERCIAL

## 2023-12-05 LAB — ACT BLD: 153 SEC (ref 89–169)

## 2023-12-05 RX ORDER — METOPROLOL TARTRATE 25 MG/1
50 TABLET, FILM COATED ORAL 2 TIMES DAILY
Qty: 120 TABLET | Refills: 0 | Status: SHIPPED | OUTPATIENT
Start: 2023-12-05 | End: 2024-01-30 | Stop reason: WASHOUT

## 2023-12-07 ENCOUNTER — OFFICE VISIT (OUTPATIENT)
Dept: CARDIOLOGY | Facility: HOSPITAL | Age: 48
End: 2023-12-07
Payer: COMMERCIAL

## 2023-12-07 ENCOUNTER — HOSPITAL ENCOUNTER (OUTPATIENT)
Dept: CARDIOLOGY | Facility: HOSPITAL | Age: 48
Discharge: HOME | End: 2023-12-07
Payer: COMMERCIAL

## 2023-12-07 VITALS
HEIGHT: 63 IN | WEIGHT: 181.38 LBS | OXYGEN SATURATION: 95 % | DIASTOLIC BLOOD PRESSURE: 48 MMHG | HEART RATE: 68 BPM | SYSTOLIC BLOOD PRESSURE: 88 MMHG | BODY MASS INDEX: 32.14 KG/M2

## 2023-12-07 DIAGNOSIS — I21.4 NSTEMI (NON-ST ELEVATED MYOCARDIAL INFARCTION) (MULTI): ICD-10-CM

## 2023-12-07 DIAGNOSIS — I25.42 SPONTANEOUS DISSECTION OF CORONARY ARTERY: ICD-10-CM

## 2023-12-07 PROBLEM — R06.02 SHORTNESS OF BREATH ON EXERTION: Status: ACTIVE | Noted: 2023-12-07

## 2023-12-07 PROBLEM — R53.83 FATIGUE: Status: ACTIVE | Noted: 2023-12-07

## 2023-12-07 PROCEDURE — 99214 OFFICE O/P EST MOD 30 MIN: CPT | Performed by: INTERNAL MEDICINE

## 2023-12-07 PROCEDURE — 1036F TOBACCO NON-USER: CPT | Performed by: INTERNAL MEDICINE

## 2023-12-07 PROCEDURE — 93005 ELECTROCARDIOGRAM TRACING: CPT

## 2023-12-07 PROCEDURE — 93010 ELECTROCARDIOGRAM REPORT: CPT | Performed by: INTERNAL MEDICINE

## 2023-12-07 PROCEDURE — 99204 OFFICE O/P NEW MOD 45 MIN: CPT | Performed by: INTERNAL MEDICINE

## 2023-12-07 ASSESSMENT — ENCOUNTER SYMPTOMS
OCCASIONAL FEELINGS OF UNSTEADINESS: 1
LOSS OF SENSATION IN FEET: 0
DEPRESSION: 0

## 2023-12-07 ASSESSMENT — PAIN SCALES - GENERAL: PAINLEVEL: 0-NO PAIN

## 2023-12-07 NOTE — LETTER
December 14, 2023     Gene Hermosillo MD  [address] [facility] [address]    Patient: Mandie Arevalo   YOB: 1975   Date of Visit: 12/7/2023       Dear Dr. Gene Hermosillo MD:    Thank you for referring Mandie Arevalo to me for evaluation. Below are my notes for this consultation.  If you have questions, please do not hesitate to call me. I look forward to following your patient along with you.       Sincerely,     Bibiana Hudson MD      CC: Sam Rubio MD  ______________________________________________________________________________________    December 7, 2023    Vascular Medicine - FMD/Arterial Dissection Program    This 48 y.o. woman is here to establish care with my for coronary artery dissection. She is referred by Dr. Gene Hermosillo.  She had little past medical history -- no hx of migraine  headaches, any prior CV disease, or hypertension. No hx of inflammatory disorders, thyroid disorder, or hyperlipidemia.    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  It progressed and was associated with emesis.  She went to [facility] ER,  Initial HR 79 beats per minute, /85 mm Hg.  CT scan chest/abd/pelvis done to r/o dissection and PE (both not present) and initial hs troponin 121 -- 352. Initial ECG demonstrated  Mnor t wave abnormality inferiorly c/w NSTEMI.  She was heparinized and taken for urgent coronary angiography done via right radial approach which found tapering stenosis in the mid/distal LAD c/w type SCAD which was managed conservatively.  Her LV gram showed AK/DK apex with an appearance that could be c/w Takotsubo. Heparin was discontinued and she was on aspirin  + Brilinta, then aspirin + Plavix as well as statin Rx and metoprolol.  Echo prior to discharge on 11.27 with LVEF 55% with report of no RWMA.  Peak hs troponin was 1334.    She was discharged home on 11.27.2023 on DAPT  and metoprolol/statin Rx and felt fine initially, doing light errands, but then returned on 11.29 with onset of recurrent chest discomfort awakening her from sleep.  ECG in ER with slight ST elevation in infero lateral leads (< 1 mm). She was readmitted and reheparinized, started on isordil as well as B Blocker. Repeat coronary angiogram done the following day with progression of the LAD SCAD with occlusion distally; this was managed conservatively.  On repeat ECG, this does look like a possible STEMI/near STEMI.  She had a re-rise/reinfarction with troponin at 682 initially (down from discharge troponin 725) peaking at 8570. She was discharge home on 12.3.  Since discharge, she is actually feeling surprisingly well. No recurrent chest pain.      Past Medical History:   Diagnosis Date   • Fatigue    • NSTEMI (non-ST elevated myocardial infarction) (CMS/HCC)    • Shortness of breath on exertion    • Spontaneous dissection of coronary artery     x2     No prior hx of FMD or arteriopathy  No prior cardiac hx  No hx of HTN  No hx of inflammatory    Family hx + maternal grandmother with aortic issue ? Aneurysm ? 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 Do not start before November 28, 2023. 30 tablet 1   • atorvastatin (Lipitor) 40 mg tablet Take 1 tablet (40 mg) by mouth once daily at bedtime. 30 tablet 1   • clopidogrel (Plavix) 75 mg tablet Take 1 tablet (75 mg) by mouth once daily for 365 doses. Do not start before November 28, 2023. 30 tablet 11   • isosorbide dinitrate  "(Isordil) 5 mg tablet Take 1 tablet (5 mg) by mouth 3 times a day. 90 tablet 0   • metoprolol tartrate (Lopressor) 25 mg tablet Take 2 tablets (50 mg) by mouth 2 times a day. 120 tablet 0   • triamcinolone (Kenalog) 0.1 % cream APPLY AS NEEDED TWICE DAILY TO FLARES       No current facility-administered medications for this visit.     BP (!) 88/48 (BP Location: Right arm, Patient Position: Sitting, BP Cuff Size: Adult)   Pulse 68   Ht 1.6 m (5' 3\")   Wt 82.3 kg (181 lb 6 oz)   SpO2 95%   BMI 32.13 kg/m²   She was in no distress  HEENT benign  JVP flat  No carotid bruits  +S1, S2, RRR, ? Dilated PMI  Clear lungs  Abd benign, no abdominal/femoral bruits  Brisk LE pulses    Catheter angiograms reviewed  I do think here original angiogram shows a mid/distal LAD lesion c/w type II SCAD with APRIL III flow with more severe narrowing of the distal LAD on subsequent angiogram    CTA c/a/p  11.25 reviewed  I do not see FMD or visceral/aortic aneurysms    11.29.2023 Echo  LVEF 50-55%  Apical septal, apical anterior, apex, apical inferior RWMA    ECG NSR at 64 beats per minute.  1/2 mm residual ST elevation inferolateral leads.    Assessment/Plan/Recommendations:  48 y.o. woman with no prior cardiac history with initial NSTEMI with LAD lesion c/w type II spontaneous coronary artery dissection; LV gram with a Takotsubo appearance which has been reported in LAD SCAD. She was discharged home then had a new MI event/extension/progression of the LAD lesion. She is is now clinically stable.    We touched on many things today.    From a SCAD management perspective, some controversy in terms of optimal anti PLT regimen DAPT vs. SAPT  Will keep on DAPT for now but likely for just 4-6 weeks then SAPT.  Continue B Blocker She has no chest pain and BP is low, will discontinue isordil.  Plan cardiac rehab, but I'd like to wait 4 weeks post SCAD event to allow for recanalization/healing. Discussed activity modification.  I gave her a " prescription for SL TNG to take PRN.  She needs repeat Echo.  PMI dilated; need to r/o LV apical aneurysm/remodeling with risk for apical thrombus and revisit LVEF.    WE need to complete work-up for FMD; not seen in abdomen and aorta normal (has family hx of aortic disease in paternal grandmother).  She has hx of migraine headaches. I will obtain CTA head/neck; assessment for FMD, occult aneurysms is recommended for patients with SCAD.    I answered her excellent questions and those of her family with her today.    I will see her again in ~ 4-5 weeks' time.    Ms. Arevalo consented to participate in the iSCAD Registry.    Bibiana Hudson MD

## 2023-12-07 NOTE — PATIENT INSTRUCTIONS
It is very nice to meet you and your family today Ms. Arevalo.  Stop Isosorbide today.  Ordered nitroglycerine as needed.     Continue other medications as prescribed.  Check BP at home.     We have ordered ECHO and CTA head/neck to be done at [facility] and we will see you January 9 @ 12pm.    Very truly yours,    Bibiana Hudson MD  Co-Director, Vascular Center  [facility], [facility]   [facility] Master Clinician in Fibromuscular Dysplasia and Vascular Care  Professor of Medicine  [facility]

## 2023-12-07 NOTE — PROGRESS NOTES
December 7, 2023    Vascular Medicine - FMD/Arterial Dissection Program    This 48 y.o. woman is here to establish care with my for coronary artery dissection. She is referred by Dr. Gene Hermosillo.  She had little past medical history -- no hx of migraine  headaches, any prior CV disease, or hypertension. No hx of inflammatory disorders, thyroid disorder, or hyperlipidemia.    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  It progressed and was associated with emesis.  She went to [place] [facility],  Initial HR 79 beats per minute, /85 mm Hg.  CT scan chest/abd/pelvis done to r/o dissection and PE (both not present) and initial hs troponin 121 -- 352. Initial ECG demonstrated  Mnor t wave abnormality inferiorly c/w NSTEMI.  She was heparinized and taken for urgent coronary angiography done via right radial approach which found tapering stenosis in the mid/distal LAD c/w type SCAD which was managed conservatively.  Her LV gram showed AK/DK apex with an appearance that could be c/w Takotsubo. Heparin was discontinued and she was on aspirin  + Brilinta, then aspirin + Plavix as well as statin Rx and metoprolol.  Echo prior to discharge on 11.27 with LVEF 55% with report of no RWMA.  Peak hs troponin was 1334.    She was discharged home on 11.27.2023 on DAPT and metoprolol/statin Rx and felt fine initially, doing light errands, but then returned on 11.29 with onset of recurrent chest discomfort awakening her from sleep.  ECG in ER with slight ST elevation in infero lateral leads (< 1 mm). She was readmitted and reheparinized, started on isordil as well as B Blocker. Repeat coronary angiogram done the following day with progression of the LAD SCAD with occlusion distally; this was managed conservatively.  On repeat ECG, this does look like a possible STEMI/near STEMI.  She had a re-rise/reinfarction with troponin at 682 initially  "(down from discharge troponin 725) peaking at 8570. She was discharge home on 12.3.  Since discharge, she is actually feeling surprisingly well. No recurrent chest pain.      Past Medical History:   Diagnosis Date    Fatigue     NSTEMI (non-ST elevated myocardial infarction) (CMS/HCC)     Shortness of breath on exertion     Spontaneous dissection of coronary artery     x2     No prior hx of FMD or arteriopathy  No prior cardiac hx  No hx of HTN  No hx of inflammatory    Family hx + maternal grandmother with aortic issue ? Aneurysm ? 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 Do not start before November 28, 2023. 30 tablet 1    atorvastatin (Lipitor) 40 mg tablet Take 1 tablet (40 mg) by mouth once daily at bedtime. 30 tablet 1    clopidogrel (Plavix) 75 mg tablet Take 1 tablet (75 mg) by mouth once daily for 365 doses. Do not start before November 28, 2023. 30 tablet 11    isosorbide dinitrate (Isordil) 5 mg tablet Take 1 tablet (5 mg) by mouth 3 times a day. 90 tablet 0    metoprolol tartrate (Lopressor) 25 mg tablet Take 2 tablets (50 mg) by mouth 2 times a day. 120 tablet 0    triamcinolone (Kenalog) 0.1 % cream APPLY AS NEEDED TWICE DAILY TO FLARES       No current facility-administered medications for this visit.     BP (!) 88/48 (BP Location: Right arm, Patient Position: Sitting, BP Cuff Size: Adult)   Pulse 68   Ht 1.6 m (5' 3\")   Wt 82.3 kg (181 lb 6 oz)   SpO2 95%   BMI 32.13 kg/m²   She was in no distress  HEENT benign  JVP flat  No carotid bruits  +S1, S2, RRR, ? " Dilated PMI  Clear lungs  Abd benign, no abdominal/femoral bruits  Brisk LE pulses    Catheter angiograms reviewed  I do think here original angiogram shows a mid/distal LAD lesion c/w type II SCAD with APRIL III flow with more severe narrowing of the distal LAD on subsequent angiogram    CTA c/a/p  11.25 reviewed  I do not see FMD or visceral/aortic aneurysms    11.29.2023 Echo  LVEF 50-55%  Apical septal, apical anterior, apex, apical inferior RWMA    ECG NSR at 64 beats per minute.  1/2 mm residual ST elevation inferolateral leads.    Assessment/Plan/Recommendations:  48 y.o. woman with no prior cardiac history with initial NSTEMI with LAD lesion c/w type II spontaneous coronary artery dissection; LV gram with a Takotsubo appearance which has been reported in LAD SCAD. She was discharged home then had a new MI event/extension/progression of the LAD lesion. She is is now clinically stable.    We touched on many things today.    From a SCAD management perspective, some controversy in terms of optimal anti PLT regimen DAPT vs. SAPT  Will keep on DAPT for now but likely for just 4-6 weeks then SAPT.  Continue B Blocker She has no chest pain and BP is low, will discontinue isordil.  Plan cardiac rehab, but I'd like to wait 4 weeks post SCAD event to allow for recanalization/healing. Discussed activity modification.  I gave her a prescription for SL TNG to take PRN.  She needs repeat Echo.  PMI dilated; need to r/o LV apical aneurysm/remodeling with risk for apical thrombus and revisit LVEF.    WE need to complete work-up for FMD; not seen in abdomen and aorta normal (has family hx of aortic disease in paternal grandmother).  She has hx of migraine headaches. I will obtain CTA head/neck; assessment for FMD, occult aneurysms is recommended for patients with SCAD.    I answered her excellent questions and those of her family with her today.    I will see her again in ~ 4-5 weeks' time.    Ms. Arevalo consented to  participate in the iSCAD Registry.    Bibiana Hudson MD

## 2023-12-08 PROBLEM — I21.9 MYOCARDIAL INFARCTION (MULTI): Status: ACTIVE | Noted: 2023-11-25

## 2023-12-08 PROBLEM — R07.9 ACUTE CHEST PAIN: Status: ACTIVE | Noted: 2023-12-08

## 2023-12-08 RX ORDER — ACETAMINOPHEN 325 MG/1
650 TABLET ORAL EVERY 4 HOURS PRN
COMMUNITY
Start: 2023-11-29

## 2023-12-08 RX ORDER — OXYCODONE AND ACETAMINOPHEN 5; 325 MG/1; MG/1
1 TABLET ORAL EVERY 6 HOURS PRN
COMMUNITY
Start: 2023-11-29 | End: 2023-12-19 | Stop reason: WASHOUT

## 2023-12-08 RX ORDER — LIDOCAINE 560 MG/1
PATCH PERCUTANEOUS; TOPICAL; TRANSDERMAL
COMMUNITY
Start: 2023-11-29 | End: 2023-12-19 | Stop reason: WASHOUT

## 2023-12-12 LAB
ATRIAL RATE: 64 BPM
P AXIS: 49 DEGREES
P OFFSET: 193 MS
P ONSET: 138 MS
PR INTERVAL: 166 MS
Q ONSET: 221 MS
QRS COUNT: 11 BEATS
QRS DURATION: 78 MS
QT INTERVAL: 384 MS
QTC CALCULATION(BAZETT): 396 MS
QTC FREDERICIA: 392 MS
R AXIS: 95 DEGREES
T AXIS: 71 DEGREES
T OFFSET: 413 MS
VENTRICULAR RATE: 64 BPM

## 2023-12-15 DIAGNOSIS — R07.9 ACUTE CHEST PAIN: ICD-10-CM

## 2023-12-15 RX ORDER — NITROGLYCERIN 0.4 MG/1
0.4 TABLET SUBLINGUAL EVERY 5 MIN PRN
COMMUNITY
End: 2023-12-15 | Stop reason: SDUPTHER

## 2023-12-15 RX ORDER — NITROGLYCERIN 0.4 MG/1
0.4 TABLET SUBLINGUAL EVERY 5 MIN PRN
Qty: 5 TABLET | Refills: 3 | Status: SHIPPED | OUTPATIENT
Start: 2023-12-15

## 2023-12-19 ENCOUNTER — OFFICE VISIT (OUTPATIENT)
Dept: CARDIOLOGY | Facility: CLINIC | Age: 48
End: 2023-12-19
Payer: COMMERCIAL

## 2023-12-19 VITALS
HEART RATE: 67 BPM | WEIGHT: 183.5 LBS | OXYGEN SATURATION: 99 % | BODY MASS INDEX: 32.51 KG/M2 | SYSTOLIC BLOOD PRESSURE: 122 MMHG | DIASTOLIC BLOOD PRESSURE: 98 MMHG | HEIGHT: 63 IN

## 2023-12-19 DIAGNOSIS — E78.2 MIXED HYPERLIPIDEMIA: ICD-10-CM

## 2023-12-19 DIAGNOSIS — I25.42 SPONTANEOUS DISSECTION OF CORONARY ARTERY: Primary | ICD-10-CM

## 2023-12-19 DIAGNOSIS — I25.10 CORONARY ARTERY DISEASE INVOLVING NATIVE CORONARY ARTERY OF NATIVE HEART WITHOUT ANGINA PECTORIS: ICD-10-CM

## 2023-12-19 PROBLEM — R06.09 DYSPNEA ON EXERTION: Status: ACTIVE | Noted: 2023-12-07

## 2023-12-19 PROBLEM — E78.5 HLD (HYPERLIPIDEMIA): Status: ACTIVE | Noted: 2023-12-19

## 2023-12-19 PROBLEM — I25.2 HX OF NON-ST ELEVATION MYOCARDIAL INFARCTION (NSTEMI): Status: ACTIVE | Noted: 2023-12-19

## 2023-12-19 PROCEDURE — 1036F TOBACCO NON-USER: CPT | Performed by: STUDENT IN AN ORGANIZED HEALTH CARE EDUCATION/TRAINING PROGRAM

## 2023-12-19 PROCEDURE — 99215 OFFICE O/P EST HI 40 MIN: CPT | Performed by: STUDENT IN AN ORGANIZED HEALTH CARE EDUCATION/TRAINING PROGRAM

## 2023-12-19 PROCEDURE — 93000 ELECTROCARDIOGRAM COMPLETE: CPT | Performed by: STUDENT IN AN ORGANIZED HEALTH CARE EDUCATION/TRAINING PROGRAM

## 2023-12-19 NOTE — PROGRESS NOTES
[facility] Heart and Vascular Cardiology Clinic Note    Date: 12/19/23  Time: 5:30 PM    Subjective    Mandie Arevalo is a 48 y.o.  female female who is coming to clinic for post hospital f/up visit.     Briefly patient was recently admitted to hospital for NSTEMI. LHC showed diffuse distal LAD diz suspicious for SCAD. LV gram showed Taukosubo pattern. Echo was reviewed by me and there were apical RWMA. Formal read didn't mention any RWMA. She was discharged and recommended f/up with vascular medicine. She returned few days later with worsening pain and recurrent NSTEMI with rise in trop. LHC showed progression of SCAD and occluded apical LAD. She was stabilized in hospital and medical management. She recently saw vascular medicine e for f/up    She is being worked up for FMD currently.     Blood pressure is better today.  She reports a slightly elevated because she was rushing here.  At home her blood pressure is in 90s over 70s with some numbers in 80s.  She reports no chest pain or dyspnea.  She reports that she is not as tired as before after discontinuing of Isordil.  She is going to start cardiac rehab after 6 weeks.        She has a follow-up appointment with vascular medicine in early January.  She is scheduled for echocardiogram soon.      She was given as needed sublingual nitroglycerin but has not used it.    Review of Systems:  Otherwise, limited cardiovascular review of systems is negative.        Medical History:   She has a past medical history of Fatigue, NSTEMI (non-ST elevated myocardial infarction) (CMS/HCC), Shortness of breath on exertion, and Spontaneous dissection of coronary artery.  Surgical History:   Past Surgical History:   Procedure Laterality Date    CARDIAC CATHETERIZATION N/A 11/25/2023    Procedure: Left Heart Cath, With LV;  Surgeon: Gene Hermosillo MD;  Location: [facility] Cardiac Cath Lab;  Service: Cardiovascular;  Laterality: N/A;    CARDIAC CATHETERIZATION N/A  "2023    Procedure: Left Heart Cath, With LV;  Surgeon: Gene Hermosillo MD;  Location: [facility] Cardiac Cath Lab;  Service: Cardiovascular;  Laterality: N/A;     SECTION, CLASSIC      x2    CORONARY ANGIOPLASTY  2023   PSHP@  Social History:   Social Determinants of Health with Concerns     Financial Resource Strain: Low Risk  (2023)    Overall Financial Resource Strain (CARDIA)     Difficulty of Paying Living Expenses: Not very hard   Recent Concern: Financial Resource Strain - Medium Risk (2023)    Overall Financial Resource Strain (CARDIA)     Difficulty of Paying Living Expenses: Somewhat hard   Food Insecurity: Not on file   Physical Activity: Not on file   Stress: Not on file   Social Connections: Not on file   Intimate Partner Violence: Not on file   Utilities: Not on file   Digital Equity: Not on file     Family History:   Family History   Problem Relation Name Age of Onset    Atrial fibrillation Father      Hypertension Father      Other (aortic disorder) Maternal Grandmother        Allergies:  Patient has no known allergies.    Outpatient Medications:  Current Outpatient Medications   Medication Instructions    acetaminophen (TYLENOL) 650 mg, oral, Every 4 hours PRN    aspirin 81 mg, oral, Daily    atorvastatin (LIPITOR) 40 mg, oral, Nightly    clopidogrel (PLAVIX) 75 mg, oral, Daily    metoprolol tartrate (LOPRESSOR) 50 mg, oral, 2 times daily    nitroglycerin (NITROSTAT) 0.4 mg, sublingual, Every 5 min PRN    triamcinolone (Kenalog) 0.1 % cream APPLY AS NEEDED TWICE DAILY TO FLARES       Objective     Physical Exam  Vitals:    23 1053   BP: (!) 122/98   BP Location: Right arm   Patient Position: Sitting   BP Cuff Size: Adult   Pulse: 67   SpO2: 99%   Weight: 83.2 kg (183 lb 8 oz)   Height: 1.6 m (5' 3\")     Wt Readings from Last 3 Encounters:   23 83.2 kg (183 lb 8 oz)   23 82.3 kg (181 lb 6 oz)   23 78.9 kg (173 lb 15.1 oz)       General: Alert and " Oriented, No distress, cooperative  Head: Normocephalic without obvious abnormality, atraumatic  Eyes: Conjunctiva/corneas clear, EOM's grossly intact  Neck: Supple, trachea midline, No thyroid enlargement/tenderness/nodules; No JVD  Lungs: Clear to auscultation bilaterally, no wheezes, rhonci, or rales. respirations unlabored  Chest Wall: No tenderness or deformity  Heart: Regular rhythm, normal S1/S2, no murmur  Abdomen: Soft, non-tender, Non-distended, bowel sounds active  Extremities: No edema, no cyanosis, no clubbing  Skin: Skin color, texture, turgor normal.  No rashes or lesions noted  Neurologic: Alert and oriented x 3, grossly moving all extremities, speech intact      I have personally reviewed the following images and laboratory findings:  ECG:  Sinus heidy, TWI in anterolateral leads, possible ischemia/LV aneurysm. Minimal ST elevation in inferior leads   Echocardiogram: abnormal and reviewed by myself  11/29/2023  PHYSICIAN INTERPRETATION:  Left Ventricle: Left ventricular systolic function is low normal, with an estimated ejection fraction of 50-55%. Wall motion is abnormal. The left ventricular cavity size is normal. Left ventricular diastolic filling was not assessed.  LV Wall Scoring:  The apical septal segment, apical anterior segment, and apex are akinetic. The  apical inferior segment is hypokinetic. All remaining scored segments are  normal.     Left Atrium: The left atrium was not assessed.  Right Ventricle: The right ventricle was not assessed. Right ventricular systolic function not assessed.  Right Atrium: The right atrium was not assessed.  Aortic Valve: The aortic valve was not assessed. Aortic valve regurgitation was not assessed.  Mitral Valve: The mitral valve was not assessed. Mitral valve regurgitation was not assessed.  Tricuspid Valve: The tricuspid valve was not assessed. Tricuspid regurgitation was not assessed.  Pulmonic Valve: The pulmonic valve was not assessed. The pulmonic  valve regurgitation was not assessed.  Pericardium: Pericardial effusion was not assessed.  Aorta: The aortic root was not assessed.        CONCLUSIONS:   1. Left ventricular systolic function is low normal with a 50-55% estimated ejection fraction.   2. Apical septal segment, apical anterior segment, apex, and apical inferior segment are abnormal.          [facility] 11/30/23  Recommendations:  Maximize medical therapy.  Agressive risk factor modification efforts.  Follow-up with cardiology clinic.  Outpatient evaluation with vascular medicine.  Consider referral to cardiac rehabilitation.  Aspirin therapy.  Beta blocker therapy.     ____________________________________________________________________________________  CONCLUSIONS:   1. Spontaneous coronary artery dissection in distal LAD.   2. No significant coronary artery disease elsewhere as specified above.     Laboratory values:   Office Visit on 12/07/2023   Component Date Value    Ventricular Rate 12/08/2023 64     Atrial Rate 12/08/2023 64     OR Interval 12/08/2023 166     QRS Duration 12/08/2023 78     QT Interval 12/08/2023 384     QTC Calculation(Bazett) 12/08/2023 396     P Axis 12/08/2023 49     R Axis 12/08/2023 95     T Axis 12/08/2023 71     QRS Count 12/08/2023 11     Q Onset 12/08/2023 221     P Onset 12/08/2023 138     P Offset 12/08/2023 193     T Offset 12/08/2023 413     QTC Fredericia 12/08/2023 392    Admission on 11/29/2023, Discharged on 12/03/2023   Component Date Value    WBC 11/29/2023 11.0     nRBC 11/29/2023 0.0     RBC 11/29/2023 4.31     Hemoglobin 11/29/2023 13.2     Hematocrit 11/29/2023 38.1     MCV 11/29/2023 88     MCH 11/29/2023 30.6     MCHC 11/29/2023 34.6     RDW 11/29/2023 12.2     Platelets 11/29/2023 282     Neutrophils % 11/29/2023 66.0     Immature Granulocytes %,* 11/29/2023 0.6     Lymphocytes % 11/29/2023 22.2     Monocytes % 11/29/2023 8.2     Eosinophils % 11/29/2023 2.5     Basophils % 11/29/2023 0.5     Neutrophils  Absolute 11/29/2023 7.26     Immature Granulocytes Ab* 11/29/2023 0.07     Lymphocytes Absolute 11/29/2023 2.44     Monocytes Absolute 11/29/2023 0.90     Eosinophils Absolute 11/29/2023 0.28     Basophils Absolute 11/29/2023 0.06     Glucose 11/29/2023 107 (H)     Sodium 11/29/2023 133 (L)     Potassium 11/29/2023 3.4 (L)     Chloride 11/29/2023 102     Bicarbonate 11/29/2023 23     Anion Gap 11/29/2023 11     Urea Nitrogen 11/29/2023 10     Creatinine 11/29/2023 0.77     eGFR 11/29/2023 >90     Calcium 11/29/2023 8.9     Albumin 11/29/2023 4.3     Alkaline Phosphatase 11/29/2023 62     Total Protein 11/29/2023 7.1     AST 11/29/2023 13     Bilirubin, Total 11/29/2023 0.3     ALT 11/29/2023 10     Magnesium 11/29/2023 1.73     Phosphorus 11/29/2023 2.6     BNP 11/29/2023 93     Troponin I, High Sensiti* 11/29/2023 682 (HH)     D-Dimer Non VTE, Quant (* 11/29/2023 1,096 (H)     POCT pH, Venous 11/29/2023 7.48 (H)     POCT pCO2, Venous 11/29/2023 32 (L)     POCT pO2, Venous 11/29/2023 30 (L)     POCT SO2, Venous 11/29/2023 46     POCT Oxy Hemoglobin, Romulo* 11/29/2023 45.0     POCT Hematocrit Calculat* 11/29/2023 41.0     POCT Sodium, Venous 11/29/2023 134 (L)     POCT Potassium, Venous 11/29/2023 3.5     POCT Chloride, Venous 11/29/2023 103     POCT Ionized Calicum, Ve* 11/29/2023 1.13     POCT Glucose, Venous 11/29/2023 113 (H)     POCT Lactate, Venous 11/29/2023 1.4     POCT Base Excess, Venous 11/29/2023 0.9     POCT HCO3 Calculated, Ve* 11/29/2023 23.8     POCT Hemoglobin, Venous 11/29/2023 13.5     POCT Anion Gap, Venous 11/29/2023 11.0     Patient Temperature 11/29/2023 37.0     FiO2 11/29/2023 21     Protime 11/29/2023 11.8     INR 11/29/2023 1.0     aPTT 11/29/2023 34     Troponin I, High Sensiti* 11/29/2023 450 (HH)     LV biplane EF 11/29/2023 58     MV E/A ratio 11/29/2023 0.79     LVIDd 11/29/2023 4.61     LV A4C EF 11/29/2023 56.7     Ventricular Rate 11/30/2023 68     Atrial Rate 11/30/2023 68     SC  Interval 11/30/2023 170     QRS Duration 11/30/2023 78     QT Interval 11/30/2023 396     QTC Calculation(Bazett) 11/30/2023 421     P Axis 11/30/2023 37     R [place]ver 11/30/2023 67     T Wever 11/30/2023 50     QRS Count 11/30/2023 12     Q Onset 11/30/2023 222     P Onset 11/30/2023 137     P Offset 11/30/2023 192     T Offset 11/30/2023 420     QTC Fredericia 11/30/2023 413     Troponin I, High Sensiti* 11/29/2023 5,818 (HH)     Troponin I, High Sensiti* 11/30/2023 8,570 (HH)     Glucose 11/30/2023 113 (H)     Sodium 11/30/2023 133 (L)     Potassium 11/30/2023 4.2     Chloride 11/30/2023 103     Bicarbonate 11/30/2023 23     Anion Gap 11/30/2023 11     Urea Nitrogen 11/30/2023 8     Creatinine 11/30/2023 0.76     eGFR 11/30/2023 >90     Calcium 11/30/2023 8.5 (L)     Ventricular Rate 11/30/2023 79     Atrial Rate 11/30/2023 79     DC Interval 11/30/2023 160     QRS Duration 11/30/2023 72     QT Interval 11/30/2023 374     QTC Calculation(Bazett) 11/30/2023 428     P Axis 11/30/2023 37     R Wever 11/30/2023 46     T Axis 11/30/2023 39     QRS Count 11/30/2023 13     Q Onset 11/30/2023 221     P Onset 11/30/2023 141     P Offset 11/30/2023 188     T Offset 11/30/2023 408     QTC Fredericia 11/30/2023 410     Magnesium 11/30/2023 1.71     Heparin Unfractionated 11/30/2023 0.6     WBC 11/30/2023 13.6 (H)     nRBC 11/30/2023 0.0     RBC 11/30/2023 3.93 (L)     Hemoglobin 11/30/2023 12.1     Hematocrit 11/30/2023 35.0 (L)     MCV 11/30/2023 89     MCH 11/30/2023 30.8     MCHC 11/30/2023 34.6     RDW 11/30/2023 12.4     Platelets 11/30/2023 261     Heparin Unfractionated 11/30/2023 0.6     Ventricular Rate 11/30/2023 74     Atrial Rate 11/30/2023 74     DC Interval 11/30/2023 162     QRS Duration 11/30/2023 76     QT Interval 11/30/2023 378     QTC Calculation(Bazett) 11/30/2023 419     P Axis 11/30/2023 32     R Wever 11/30/2023 49     T Wever 11/30/2023 55     QRS Count 11/30/2023 12     Q Onset 11/30/2023 223     P Onset  11/30/2023 142     P Offset 11/30/2023 192     T Offset 11/30/2023 412     QTC Fredericia 11/30/2023 405     Ventricular Rate 11/30/2023 80     Atrial Rate 11/30/2023 80     OH Interval 11/30/2023 158     QRS Duration 11/30/2023 74     QT Interval 11/30/2023 360     QTC Calculation(Bazett) 11/30/2023 415     P Axis 11/30/2023 28     R [place] 11/30/2023 47     T Axis 11/30/2023 50     QRS Count 11/30/2023 13     Q Onset 11/30/2023 222     P Onset 11/30/2023 143     P Offset 11/30/2023 199     T Offset 11/30/2023 402     QTC Fredericia 11/30/2023 396     Glucose 12/01/2023 96     Sodium 12/01/2023 135 (L)     Potassium 12/01/2023 3.9     Chloride 12/01/2023 107     Bicarbonate 12/01/2023 23     Anion Gap 12/01/2023 9 (L)     Urea Nitrogen 12/01/2023 8     Creatinine 12/01/2023 0.83     eGFR 12/01/2023 87     Calcium 12/01/2023 8.5 (L)     Phosphorus 12/01/2023 3.0     Albumin 12/01/2023 3.7     Troponin I, High Sensiti* 12/01/2023 4,296 (HH)     Magnesium 12/03/2023 1.79     Glucose 12/03/2023 94     Sodium 12/03/2023 136     Potassium 12/03/2023 4.1     Chloride 12/03/2023 106     Bicarbonate 12/03/2023 24     Anion Gap 12/03/2023 10     Urea Nitrogen 12/03/2023 14     Creatinine 12/03/2023 0.78     eGFR 12/03/2023 >90     Calcium 12/03/2023 8.6     POCT Activated Clotting * 11/30/2023 153    Admission on 11/25/2023, Discharged on 11/27/2023   Component Date Value    WBC 11/25/2023 9.2     nRBC 11/25/2023 0.0     RBC 11/25/2023 4.30     Hemoglobin 11/25/2023 13.4     Hematocrit 11/25/2023 38.5     MCV 11/25/2023 90     MCH 11/25/2023 31.2     MCHC 11/25/2023 34.8     RDW 11/25/2023 12.5     Platelets 11/25/2023 265     Neutrophils % 11/25/2023 75.6     Immature Granulocytes %,* 11/25/2023 0.5     Lymphocytes % 11/25/2023 15.6     Monocytes % 11/25/2023 6.3     Eosinophils % 11/25/2023 1.7     Basophils % 11/25/2023 0.3     Neutrophils Absolute 11/25/2023 6.90     Immature Granulocytes Ab* 11/25/2023 0.05      Lymphocytes Absolute 11/25/2023 1.43     Monocytes Absolute 11/25/2023 0.58     Eosinophils Absolute 11/25/2023 0.16     Basophils Absolute 11/25/2023 0.03     Glucose 11/25/2023 86     Sodium 11/25/2023 137     Potassium 11/25/2023 4.0     Chloride 11/25/2023 105     Bicarbonate 11/25/2023 25     Anion Gap 11/25/2023 11     Urea Nitrogen 11/25/2023 9     Creatinine 11/25/2023 0.84     eGFR 11/25/2023 86     Calcium 11/25/2023 9.0     Albumin 11/25/2023 4.4     Alkaline Phosphatase 11/25/2023 61     Total Protein 11/25/2023 7.2     AST 11/25/2023 13     Bilirubin, Total 11/25/2023 0.4     ALT 11/25/2023 12     Magnesium 11/25/2023 1.83     BNP 11/25/2023 22     Troponin I, High Sensiti* 11/25/2023 121 ()     Troponin I, High Sensiti* 11/25/2023 352 (HH)     Troponin I, High Sensiti* 11/25/2023 717 (HH)     Protime 11/25/2023 13.8 (H)     INR 11/25/2023 1.2 (H)     aPTT 11/25/2023 102 (HH)     Cholesterol 11/26/2023 184     HDL-Cholesterol 11/26/2023 48.5     Cholesterol/HDL Ratio 11/26/2023 3.8     LDL Calculated 11/26/2023 115 (H)     VLDL 11/26/2023 21     Triglycerides 11/26/2023 105     Non HDL Cholesterol 11/26/2023 136     WBC 11/26/2023 11.7 (H)     nRBC 11/26/2023 0.0     RBC 11/26/2023 3.86 (L)     Hemoglobin 11/26/2023 11.8 (L)     Hematocrit 11/26/2023 35.0 (L)     MCV 11/26/2023 91     MCH 11/26/2023 30.6     MCHC 11/26/2023 33.7     RDW 11/26/2023 12.7     Platelets 11/26/2023 265     Glucose 11/26/2023 85     Sodium 11/26/2023 137     Potassium 11/26/2023 3.7     Chloride 11/26/2023 107     Bicarbonate 11/26/2023 24     Anion Gap 11/26/2023 10     Urea Nitrogen 11/26/2023 8     Creatinine 11/26/2023 0.76     eGFR 11/26/2023 >90     Calcium 11/26/2023 8.2 (L)     Troponin I, High Sensiti* 11/26/2023 1,334 (HH)     LVOT diam 11/27/2023 1.80     LV biplane EF 11/27/2023 60     MV E/A ratio 11/27/2023 1.32     MV avg E/e' ratio 11/27/2023 7.57     Tricuspid annular plane * 11/27/2023 2.7     LA vol index  A/L 11/27/2023 26.9     RV free wall pk S' 11/27/2023 12.40     RVSP 11/27/2023 21.8     LVIDd 11/27/2023 4.50     LV A4C EF 11/27/2023 63.5     Hemoglobin A1C 11/26/2023 5.5     Estimated Average Glucose 11/26/2023 111     WBC 11/27/2023 9.9     nRBC 11/27/2023 0.0     RBC 11/27/2023 3.95 (L)     Hemoglobin 11/27/2023 12.2     Hematocrit 11/27/2023 36.5     MCV 11/27/2023 92     MCH 11/27/2023 30.9     MCHC 11/27/2023 33.4     RDW 11/27/2023 12.7     Platelets 11/27/2023 242     Glucose 11/27/2023 92     Sodium 11/27/2023 139     Potassium 11/27/2023 4.1     Chloride 11/27/2023 107     Bicarbonate 11/27/2023 26     Anion Gap 11/27/2023 10     Urea Nitrogen 11/27/2023 12     Creatinine 11/27/2023 0.88     eGFR 11/27/2023 81     Calcium 11/27/2023 8.8     Troponin I, High Sensiti* 11/27/2023 725 (HH)     Troponin I, High Sensiti* 11/27/2023 1,006 (HH)     POCT Activated Clotting * 11/25/2023 180 (H)      CBC -  Lab Results   Component Value Date    WBC 13.6 (H) 11/30/2023    HGB 12.1 11/30/2023    HCT 35.0 (L) 11/30/2023    MCV 89 11/30/2023     11/30/2023       CMP -  Lab Results   Component Value Date    CALCIUM 8.6 12/03/2023    PHOS 3.0 12/01/2023    PROT 7.1 11/29/2023    ALBUMIN 3.7 12/01/2023    AST 13 11/29/2023    ALT 10 11/29/2023    ALKPHOS 62 11/29/2023    BILITOT 0.3 11/29/2023       LIPID PANEL -   Lab Results   Component Value Date    CHOL 184 11/26/2023    HDL 48.5 11/26/2023    CHHDL 3.8 11/26/2023    VLDL 21 11/26/2023    TRIG 105 11/26/2023    NHDL 136 11/26/2023       RENAL FUNCTION PANEL -   Lab Results   Component Value Date    K 4.1 12/03/2023    PHOS 3.0 12/01/2023       Lab Results   Component Value Date    BNP 93 11/29/2023    HGBA1C 5.5 11/26/2023        Assessment/Plan   -Spontaneous coronary dissection  -HLD  -Subsequent NSTEMI     Plan:  -We discussed regarding use of DAPT and his CAD patients.  I will continue for 4 to 6 weeks DAPT and then continue ASA 81 mg daily afterwards.   Will continue DAPT until 10th Jan and then discontinue Plavix.   -We will continue metoprolol tartrate 50 mg twice daily   -We will continue statin therapy as previously taking  -We will follow-up on echo report.  -Continue follow-up with vascular medicine team.  -Patient will be referred for cardiac rehab after 6-week.  After index event..   -Patient was instructed when to go to emergency room.  If patient has similar chest pain that does not resolve after 2 nitroglycerin call 911.    In addition, the following orders were placed today:  Orders Placed This Encounter   Procedures    ECG 12 Lead                 SIGNATURE: Gene Hermosillo MD PATIENT NAME: Mandie Arevalo   DATE/TIME: December 19, 2023 5:30 PM MRN: [id number]

## 2023-12-22 ENCOUNTER — HOSPITAL ENCOUNTER (OUTPATIENT)
Dept: CARDIOLOGY | Facility: HOSPITAL | Age: 48
Discharge: HOME | End: 2023-12-22
Payer: COMMERCIAL

## 2023-12-22 ENCOUNTER — HOSPITAL ENCOUNTER (OUTPATIENT)
Dept: RADIOLOGY | Facility: HOSPITAL | Age: 48
Discharge: HOME | End: 2023-12-22
Payer: COMMERCIAL

## 2023-12-22 DIAGNOSIS — I25.42 SPONTANEOUS DISSECTION OF CORONARY ARTERY: ICD-10-CM

## 2023-12-22 DIAGNOSIS — I21.4 NSTEMI (NON-ST ELEVATED MYOCARDIAL INFARCTION) (MULTI): ICD-10-CM

## 2023-12-22 LAB
AORTIC VALVE MEAN GRADIENT: 4.7
AORTIC VALVE PEAK VELOCITY: 1.4
AV PEAK GRADIENT: 7.8
AVA (PEAK VEL): 2.01
AVA (VTI): 1.78
EJECTION FRACTION APICAL 4 CHAMBER: 55.6
EJECTION FRACTION: 52
LEFT ATRIUM VOLUME AREA LENGTH INDEX BSA: 25.5
LEFT VENTRICLE INTERNAL DIMENSION DIASTOLE: 4.85 (ref 3.5–6)
LEFT VENTRICULAR OUTFLOW TRACT DIAMETER: 1.86
MITRAL VALVE E/A RATIO: 0.77
MITRAL VALVE E/E' RATIO: 6.98
RIGHT VENTRICLE FREE WALL PEAK S': 17.66
RIGHT VENTRICLE PEAK SYSTOLIC PRESSURE: 10.3
TRICUSPID ANNULAR PLANE SYSTOLIC EXCURSION: 2.6

## 2023-12-22 PROCEDURE — 70496 CT ANGIOGRAPHY HEAD: CPT | Performed by: RADIOLOGY

## 2023-12-22 PROCEDURE — 2550000001 HC RX 255 CONTRASTS: Performed by: INTERNAL MEDICINE

## 2023-12-22 PROCEDURE — 70496 CT ANGIOGRAPHY HEAD: CPT

## 2023-12-22 PROCEDURE — 2500000004 HC RX 250 GENERAL PHARMACY W/ HCPCS (ALT 636 FOR OP/ED): Performed by: INTERNAL MEDICINE

## 2023-12-22 PROCEDURE — 70498 CT ANGIOGRAPHY NECK: CPT | Performed by: RADIOLOGY

## 2023-12-22 PROCEDURE — 93306 TTE W/DOPPLER COMPLETE: CPT

## 2023-12-22 PROCEDURE — 93306 TTE W/DOPPLER COMPLETE: CPT | Performed by: INTERNAL MEDICINE

## 2023-12-22 RX ADMIN — HUMAN ALBUMIN MICROSPHERES AND PERFLUTREN 0.5 ML: 10; .22 INJECTION, SOLUTION INTRAVENOUS at 13:36

## 2023-12-22 RX ADMIN — IOHEXOL 50 ML: 350 INJECTION, SOLUTION INTRAVENOUS at 14:03

## 2023-12-26 PROCEDURE — RXMED WILLOW AMBULATORY MEDICATION CHARGE

## 2023-12-29 ENCOUNTER — PHARMACY VISIT (OUTPATIENT)
Dept: PHARMACY | Facility: CLINIC | Age: 48
End: 2023-12-29
Payer: COMMERCIAL

## 2024-01-09 ENCOUNTER — HOSPITAL ENCOUNTER (OUTPATIENT)
Dept: CARDIOLOGY | Facility: HOSPITAL | Age: 49
Discharge: HOME | End: 2024-01-09
Payer: COMMERCIAL

## 2024-01-09 ENCOUNTER — OFFICE VISIT (OUTPATIENT)
Dept: CARDIOLOGY | Facility: HOSPITAL | Age: 49
End: 2024-01-09
Payer: COMMERCIAL

## 2024-01-09 VITALS
SYSTOLIC BLOOD PRESSURE: 121 MMHG | DIASTOLIC BLOOD PRESSURE: 79 MMHG | BODY MASS INDEX: 32.96 KG/M2 | HEIGHT: 63 IN | OXYGEN SATURATION: 98 % | HEART RATE: 65 BPM | WEIGHT: 186 LBS

## 2024-01-09 DIAGNOSIS — I25.42 SPONTANEOUS DISSECTION OF CORONARY ARTERY: ICD-10-CM

## 2024-01-09 DIAGNOSIS — E78.2 MIXED HYPERLIPIDEMIA: ICD-10-CM

## 2024-01-09 DIAGNOSIS — R53.83 FATIGUE, UNSPECIFIED TYPE: Primary | ICD-10-CM

## 2024-01-09 PROCEDURE — 93010 ELECTROCARDIOGRAM REPORT: CPT | Performed by: INTERNAL MEDICINE

## 2024-01-09 PROCEDURE — 1036F TOBACCO NON-USER: CPT | Performed by: INTERNAL MEDICINE

## 2024-01-09 PROCEDURE — 99214 OFFICE O/P EST MOD 30 MIN: CPT | Performed by: INTERNAL MEDICINE

## 2024-01-09 PROCEDURE — 93005 ELECTROCARDIOGRAM TRACING: CPT

## 2024-01-09 RX ORDER — METOPROLOL SUCCINATE 50 MG/1
50 TABLET, EXTENDED RELEASE ORAL DAILY
Qty: 90 TABLET | Refills: 3 | Status: SHIPPED | OUTPATIENT
Start: 2024-01-09 | End: 2024-03-19 | Stop reason: SDUPTHER

## 2024-01-09 NOTE — PROGRESS NOTES
01/09/24    Vascular Medicine - FMD/Arterial Dissection Program    This 48 y.o. woman is seen in follow-up of LAD SCAD 11.2023.    She had little past medical history -- no hx of migraine  headaches, any prior CV disease, or hypertension. No hx of inflammatory disorders, thyroid disorder, or hyperlipidemia.    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  It progressed and was associated with emesis.  She went to [facility],  Initial HR 79 beats per minute, /85 mm Hg.  CT scan chest/abd/pelvis done to r/o dissection and PE (both not present) and initial hs troponin 121 -- 352. Initial ECG demonstrated  Minor T wave abnormality inferiorly c/w NSTEMI.  She was heparinized and taken for urgent coronary angiography done via right radial approach which found tapering stenosis in the mid/distal LAD c/w type SCAD which was managed conservatively.  Her LV gram showed AK/DK apex with an appearance that could be c/w Takotsubo. Heparin was discontinued and she was on aspirin  + Brilinta, then aspirin + Plavix as well as statin Rx and metoprolol.  Echo prior to discharge on 11.27 with LVEF 55% with report of no RWMA.  Peak hs troponin was 1334.    She was discharged home on 11.27.2023 on DAPT and metoprolol/statin Rx and felt fine initially, doing light errands, but then returned on 11.29 with onset of recurrent chest discomfort awakening her from sleep.  ECG in ER with slight ST elevation in infero lateral leads (< 1 mm). She was readmitted and reheparinized, started on isordil as well as B Blocker. Repeat coronary angiogram done the following day with progression of the LAD SCAD with occlusion distally; this was managed conservatively.  On repeat ECG, this does look like a possible STEMI/near STEMI.  She had a re-rise/reinfarction with troponin at 682 initially (down from discharge troponin 725) peaking at 8570. She was discharged  home on 12.3.      I first met her in December -- we planned to complete work-up for underlying FMD, stopped her isordil, arrange for follow-up echo.    Since I saw her last month, she is doing well.  She has chest pain ~ 1 x a week but it is fleeting, gone with a deep breath, different from MI. Her main complaints are fatigue, running of our energy late afternoon, cannot even unpack groceries.  She has some exertional dyspnea.  No palpitations. Bps running 90s/60s-70s for the most part, highest 122/72 mm Hg.    Past Medical History:   Diagnosis Date    Fatigue     NSTEMI (non-ST elevated myocardial infarction) (CMS/HCC)     Shortness of breath on exertion     Spontaneous dissection of coronary artery     x2     Hx migraine headaches  No prior hx of FMD or arteriopathy  No prior cardiac hx  No hx of HTN  No hx of inflammatory    Family hx + maternal grandmother with aortic issue ? Aneurysm ? 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      aspirin 81 mg chewable tablet Chew 1 tablet (81 mg) once daily. 30 tablet 1    atorvastatin (Lipitor) 40 mg tablet Take 1 tablet (40 mg) by mouth once daily at bedtime. 30 tablet 1    clopidogrel (Plavix) 75 mg tablet Take 1 tablet (75 mg) by mouth once daily for 365 doses. 30 tablet 11    metoprolol tartrate (Lopressor) 25 mg tablet Take 2 tablets (50 mg) by mouth 2 times a day. 120 tablet 0    nitroglycerin (Nitrostat) 0.4 mg SL tablet Place 1 tablet (0.4 mg) under the tongue every 5 minutes if needed for chest  "pain for up to 5 doses. 5 tablet 3    triamcinolone (Kenalog) 0.1 % cream APPLY AS NEEDED TWICE DAILY TO FLARES       No current facility-administered medications for this visit.     /79 (BP Location: Right arm)   Pulse 65   Ht 1.6 m (5' 3\")   Wt 84.4 kg (186 lb)   SpO2 98%   BMI 32.95 kg/m²   She was in no distress  HEENT benign  JVP flat  No carotid bruits  +S1, S2, RRR, no m/r/g  Clear lungs  Abd benign,  Brisk LE pulses    ECG reviewed NSR at 65 beats per minute  T wave inversions V3-V5 with persistent slight ST elevation V3-V6    Repeat Echo done, low normal LVEF, apical RWMA  CONCLUSIONS:   1. Left ventricular systolic function is low normal with a 50-55% estimated ejection fraction.   2. [place] and apical septal segment are abnormal.   3. Spectral Doppler shows an impaired relaxation pattern of left ventricular diastolic filling.    CTA head/neck personally reviewed  Right ICA s-curve, tortuosity, left ICA loop, but no classical FMD  No IC aneurysms    Prior catheter angiogram review  I do think here original angiogram shows a mid/distal LAD lesion c/w type II SCAD with APRIL III flow with more severe narrowing of the distal LAD on subsequent angiogram    CTA c/a/p  11.25 reviewed  No FMD or visceral/aortic aneurysms    11.29.2023 Echo  LVEF 50-55%  Apical septal, apical anterior, apex, apical inferior RWMA    Assessment/Plan/Recommendations:  48 y.o. woman with no prior cardiac history with initial NSTEMI with LAD lesion c/w type II spontaneous coronary artery dissection; LV gram with a Takotsubo appearance which has been reported in LAD SCAD. She was discharged home then had a new MI event/extension/progression of the LAD lesion. She is is now clinically stable. She is fatigued and BP running low at home.  She has very fleeting chest pains.  We touched on many things today.    From a SCAD management perspective, I think now OK to drop Plavix and continue aspirin alone; she has had 6 weeks of DAPT. " OK to drop B Blockade a bit- will switch to metoprolol XL 50 mg at bedtime. No significant LV dysfunction; would hold on Ace/ARB or MRA.  Continue statin for now; revisit lipids (this was a non atherosclerotic MI). I will refer to cardiac rehab at [facility].    She does not have apparent FMD. She has ICA tortuosity and family hx of aortic disease in paternal grandmother).  I will refer to medical genetics to offer arteriopathy panel.    For sleep, if cutting B Blocker, starting rehab/exercise doesn't help, might consider sleep study (her  does report some snoring).    RTC at 3 month jose from [facility].    Bibiana Hudson MD

## 2024-01-09 NOTE — PATIENT INSTRUCTIONS
Good to see you today Ms. Arevalo.  Stop Plavix.  Continue Aspirin 81mg once daily.  Stop Metoprolol Tartrate.  Start Metoprolol Succinate 50mg at night.  Let us know how BP's are doing.  Referral placed for cardiac rehab.    See you back in 6-7 weeks for follow up with EKG.     Very truly yours,    iBbiana Hudson MD  Co-Director, Vascular Center  [facility], [facility]   [facility] Master Clinician in Fibromuscular Dysplasia and Vascular Care  Professor of Medicine  [facility]

## 2024-01-09 NOTE — LETTER
January 9, 2024     Sam Rubio MD  [address] [facility] [id number]    Patient: Mandie Arevalo   YOB: 1975   Date of Visit: 1/9/2024       Dear Dr. Sam Rubio MD:    Thank you for referring Mandie Arevalo to me for evaluation. Below are my notes for this consultation.  If you have questions, please do not hesitate to call me. I look forward to following your patient along with you.       Sincerely,     Bibiana Hudson MD      CC: No Recipients  ______________________________________________________________________________________    01/09/24    Vascular Medicine - FMD/Arterial Dissection Program    This 48 y.o. woman is seen in follow-up of LAD SCAD 11.2023.    She had little past medical history -- no hx of migraine  headaches, any prior CV disease, or hypertension. No hx of inflammatory disorders, thyroid disorder, or hyperlipidemia.    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  It progressed and was associated with emesis.  She went to [facility],  Initial HR 79 beats per minute, /85 mm Hg.  CT scan chest/abd/pelvis done to r/o dissection and PE (both not present) and initial hs troponin 121 -- 352. Initial ECG demonstrated  Minor T wave abnormality inferiorly c/w NSTEMI.  She was heparinized and taken for urgent coronary angiography done via right radial approach which found tapering stenosis in the mid/distal LAD c/w type SCAD which was managed conservatively.  Her LV gram showed AK/DK apex with an appearance that could be c/w Takotsubo. Heparin was discontinued and she was on aspirin  + Brilinta, then aspirin + Plavix as well as statin Rx and metoprolol.  Echo prior to discharge on 11.27 with LVEF 55% with report of no RWMA.  Peak hs troponin was 1334.    She was discharged home on 11.27.2023 on DAPT and metoprolol/statin Rx and felt fine initially, doing light  errands, but then returned on 11.29 with onset of recurrent chest discomfort awakening her from sleep.  ECG in ER with slight ST elevation in infero lateral leads (< 1 mm). She was readmitted and reheparinized, started on isordil as well as B Blocker. Repeat coronary angiogram done the following day with progression of the LAD SCAD with occlusion distally; this was managed conservatively.  On repeat ECG, this does look like a possible STEMI/near STEMI.  She had a re-rise/reinfarction with troponin at 682 initially (down from discharge troponin 725) peaking at 8570. She was discharged home on 12.3.      I first met her in December -- we planned to complete work-up for underlying FMD, stopped her isordil, arrange for follow-up echo.    Since I saw her last month, she is doing well.  She has chest pain ~ 1 x a week but it is fleeting, gone with a deep breath, different from MI. Her main complaints are fatigue, running of our energy late afternoon, cannot even unpack groceries.  She has some exertional dyspnea.  No palpitations. Bps running 90s/60s-70s for the most part, highest 122/72 mm Hg.    Past Medical History:   Diagnosis Date   • Fatigue    • NSTEMI (non-ST elevated myocardial infarction) (CMS/HCC)    • Shortness of breath on exertion    • Spontaneous dissection of coronary artery     x2     Hx migraine headaches  No prior hx of FMD or arteriopathy  No prior cardiac hx  No hx of HTN  No hx of inflammatory    Family hx + maternal grandmother with aortic issue ? Aneurysm ? 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     • aspirin 81 mg chewable tablet Chew 1 tablet (81 mg) once daily. 30 tablet 1   • atorvastatin (Lipitor) 40 mg tablet Take 1 tablet (40 mg) by mouth once daily at bedtime. 30 tablet 1   • clopidogrel (Plavix) 75 mg tablet Take 1 tablet (75 mg) by mouth once daily for 365 doses. 30 tablet 11   • metoprolol tartrate (Lopressor) 25 mg tablet Take 2 tablets (50 mg) by mouth 2 times a day. 120 tablet 0   • nitroglycerin (Nitrostat) 0.4 mg SL tablet Place 1 tablet (0.4 mg) under the tongue every 5 minutes if needed for chest pain for up to 5 doses. 5 tablet 3   • triamcinolone (Kenalog) 0.1 % cream APPLY AS NEEDED TWICE DAILY TO FLARES       No current facility-administered medications for this visit.     /79 (BP Location: Right arm)   Pulse 65   Ht 1.6 m (5' 3\")   Wt 84.4 kg (186 lb)   SpO2 98%   BMI 32.95 kg/m²   She was in no distress  HEENT benign  JVP flat  No carotid bruits  +S1, S2, RRR, no m/r/g  Clear lungs  Abd benign,  Brisk LE pulses    ECG reviewed NSR at 65 beats per minute  T wave inversions V3-V5 with persistent slight ST elevation V3-V6    Repeat Echo done, low normal LVEF, apical RWMA  CONCLUSIONS:   1. Left ventricular systolic function is low normal with a 50-55% estimated ejection fraction.   2. [place] and apical septal segment are abnormal.   3. Spectral Doppler shows an impaired relaxation pattern of left ventricular diastolic filling.    CTA head/neck personally reviewed  Right ICA s-curve, tortuosity, left ICA loop, but no classical FMD  No IC aneurysms    Prior catheter angiogram review  I do think here original angiogram shows a mid/distal LAD lesion c/w type II SCAD with APRIL III flow with more severe narrowing of the distal LAD on subsequent angiogram    CTA c/a/p  11.25 reviewed  No FMD or visceral/aortic aneurysms    11.29.2023 Echo  LVEF 50-55%  Apical septal, apical anterior, apex, apical inferior " [facility]    Assessment/Plan/Recommendations:  48 y.o. woman with no prior cardiac history with initial NSTEMI with LAD lesion c/w type II spontaneous coronary artery dissection; LV gram with a Takotsubo appearance which has been reported in LAD SCAD. She was discharged home then had a new MI event/extension/progression of the LAD lesion. She is is now clinically stable. She is fatigued and BP running low at home.  She has very fleeting chest pains.  We touched on many things today.    From a SCAD management perspective, I think now OK to drop Plavix and continue aspirin alone; she has had 6 weeks of DAPT. OK to drop B Blockade a bit- will switch to metoprolol XL 50 mg at bedtime. No significant LV dysfunction; would hold on Ace/ARB or MRA.  Continue statin for now; revisit lipids (this was a non atherosclerotic MI). I will refer to cardiac rehab at [facility].    She does not have apparent FMD. She has ICA tortuosity and family hx of aortic disease in paternal grandmother).  I will refer to medical genetics to offer arteriopathy panel.    For sleep, if cutting B Blocker, starting rehab/exercise doesn't help, might consider sleep study (her  does report some snoring).    RTC at 3 month jose from [facility].    Bibiana Hudson MD

## 2024-01-11 DIAGNOSIS — I21.4 NSTEMI (NON-ST ELEVATION MYOCARDIAL INFARCTION) (MULTI): Primary | ICD-10-CM

## 2024-01-13 LAB
ATRIAL RATE: 65 BPM
P AXIS: 42 DEGREES
P OFFSET: 193 MS
P ONSET: 137 MS
PR INTERVAL: 170 MS
Q ONSET: 222 MS
QRS COUNT: 11 BEATS
QRS DURATION: 74 MS
QT INTERVAL: 416 MS
QTC CALCULATION(BAZETT): 432 MS
QTC FREDERICIA: 427 MS
R AXIS: 79 DEGREES
T AXIS: 62 DEGREES
T OFFSET: 430 MS
VENTRICULAR RATE: 65 BPM

## 2024-01-22 ENCOUNTER — TRANSCRIBE ORDERS (OUTPATIENT)
Dept: CARDIAC REHAB | Facility: HOSPITAL | Age: 49
End: 2024-01-22
Payer: COMMERCIAL

## 2024-01-22 DIAGNOSIS — I21.4 NON-ST ELEVATION MYOCARDIAL INFARCTION (NSTEMI) (MULTI): Primary | ICD-10-CM

## 2024-01-30 ENCOUNTER — OFFICE VISIT (OUTPATIENT)
Dept: CARDIOLOGY | Facility: CLINIC | Age: 49
End: 2024-01-30
Payer: COMMERCIAL

## 2024-01-30 VITALS
DIASTOLIC BLOOD PRESSURE: 70 MMHG | HEART RATE: 82 BPM | HEIGHT: 63 IN | WEIGHT: 188 LBS | BODY MASS INDEX: 33.31 KG/M2 | SYSTOLIC BLOOD PRESSURE: 112 MMHG | OXYGEN SATURATION: 98 %

## 2024-01-30 DIAGNOSIS — I25.42 SPONTANEOUS DISSECTION OF CORONARY ARTERY: Primary | ICD-10-CM

## 2024-01-30 DIAGNOSIS — I25.2 HX OF NON-ST ELEVATION MYOCARDIAL INFARCTION (NSTEMI): ICD-10-CM

## 2024-01-30 DIAGNOSIS — E78.2 MIXED HYPERLIPIDEMIA: ICD-10-CM

## 2024-01-30 PROCEDURE — 99214 OFFICE O/P EST MOD 30 MIN: CPT | Performed by: STUDENT IN AN ORGANIZED HEALTH CARE EDUCATION/TRAINING PROGRAM

## 2024-01-30 PROCEDURE — 1036F TOBACCO NON-USER: CPT | Performed by: STUDENT IN AN ORGANIZED HEALTH CARE EDUCATION/TRAINING PROGRAM

## 2024-01-30 NOTE — PROGRESS NOTES
[facility] Clinic Note    Date: 01/30/24  Time: 11:05 AM    Subjective    Mandie Arevalo is a 48 y.o.  female female who is coming to clinic for post hospital f/up visit.      Briefly patient was recently admitted to hospital for NSTEMI. LHC showed diffuse distal LAD diz suspicious for SCAD. LV gram showed Taukosubo pattern. She was discharged and recommended f/up with vascular medicine. She returned few days later with worsening pain and recurrent NSTEMI with rise in trop. LHC showed progression of SCAD and occluded apical LAD. She was stabilized in hospital and medical management. She recently saw vascular medicine for the same. No FMD was diagnosed on workup. She completed DAPT for ~ 6 weeks now remains on ASA. She remain on statin due to HLD.      BP is stable today. She switched to metoprolol XL nightly. Feels fatigue at time. Difficulty sleeping sometime, hasn't done a a sleep study.     She is going for cardiac rehab eval later this week.      She follow with vascular medicine.     She was given as needed sublingual nitroglycerin but used it once since last visit.     We stopped plavix   She remains on ASA  Continue statin therapy for now   On metoprolol succinate  Follows up with vascular medicine.     Review of Systems:  Otherwise, limited cardiovascular review of systems is negative.        Medical History:   She has a past medical history of Fatigue, NSTEMI (non-ST elevated myocardial infarction) (CMS/HCC), Shortness of breath on exertion, and Spontaneous dissection of coronary artery.  Surgical History:   Past Surgical History:   Procedure Laterality Date    CARDIAC CATHETERIZATION N/A 11/25/2023    Procedure: Left Heart Cath, With LV;  Surgeon: Gene Hermosillo MD;  Location: [facility] Cardiac Cath Lab;  Service: Cardiovascular;  Laterality: N/A;    CARDIAC CATHETERIZATION N/A 11/30/2023    Procedure: Left Heart Cath, With LV;  Surgeon: Gene Hermosillo MD;   "Location: [facility] Cardiac Cath Lab;  Service: Cardiovascular;  Laterality: N/A;     SECTION, CLASSIC      x2    CORONARY ANGIOPLASTY  2023   PSHP@  Social History:   Social Determinants of Health with Concerns     Financial Resource Strain: Low Risk  (2023)    Overall Financial Resource Strain (CARDIA)     Difficulty of Paying Living Expenses: Not very hard   Recent Concern: Financial Resource Strain - Medium Risk (2023)    Overall Financial Resource Strain (CARDIA)     Difficulty of Paying Living Expenses: Somewhat hard   Food Insecurity: Not on file   Physical Activity: Not on file   Stress: Not on file   Social Connections: Not on file   Intimate Partner Violence: Not on file   Utilities: Not on file   Digital Equity: Not on file     Family History:   Family History   Problem Relation Name Age of Onset    Atrial fibrillation Father      Hypertension Father      Other (aortic disorder) Maternal Grandmother        Allergies:  Patient has no known allergies.    Outpatient Medications:  Current Outpatient Medications   Medication Instructions    acetaminophen (TYLENOL) 650 mg, oral, Every 4 hours PRN    aspirin 81 mg, oral, Daily    atorvastatin (LIPITOR) 40 mg, oral, Nightly    metoprolol succinate XL (TOPROL-XL) 50 mg, oral, Daily, Do not crush or chew.    nitroglycerin (NITROSTAT) 0.4 mg, sublingual, Every 5 min PRN    triamcinolone (Kenalog) 0.1 % cream APPLY AS NEEDED TWICE DAILY TO FLARES       Objective     Physical Exam  Vitals:    24 1024   BP: 112/70   BP Location: Left arm   Patient Position: Sitting   BP Cuff Size: Adult   Pulse: 82   SpO2: 98%   Weight: 85.3 kg (188 lb)   Height: 1.6 m (5' 3\")     Wt Readings from Last 3 Encounters:   24 85.3 kg (188 lb)   24 84.4 kg (186 lb)   23 83.2 kg (183 lb 8 oz)       General: Alert and Oriented, No distress, cooperative  Head: Normocephalic without obvious abnormality, atraumatic  Eyes: Conjunctiva/corneas clear, " EOM's grossly intact  Neck: Supple, trachea midline, No thyroid enlargement/tenderness/nodules; No JVD  Lungs: Clear to auscultation bilaterally, no wheezes, rhonci, or rales. respirations unlabored  Chest Wall: No tenderness or deformity  Heart: Regular rhythm, normal S1/S2, no murmur  Abdomen: Soft, non-tender, Non-distended, bowel sounds active  Extremities: No edema, no cyanosis, no clubbing  Skin: Skin color, texture, turgor normal.  No rashes or lesions noted  Neurologic: Alert and oriented x 3, grossly moving all extremities, speech intact           I have personally reviewed the following images and laboratory findings:  ECG:  Not done today. Prior EKG shows sinus heidy, TWI in anterolateral leads, possible ischemia/LV aneurysm. Minimal ST elevation in inferior leads   Echocardiogram: abnormal and reviewed by myself  11/29/2023  PHYSICIAN INTERPRETATION:  Left Ventricle: Left ventricular systolic function is low normal, with an estimated ejection fraction of 50-55%. Wall motion is abnormal. The left ventricular cavity size is normal. Left ventricular diastolic filling was not assessed.  LV Wall Scoring:  The apical septal segment, apical anterior segment, and apex are akinetic. The  apical inferior segment is hypokinetic. All remaining scored segments are  normal.     Left Atrium: The left atrium was not assessed.  Right Ventricle: The right ventricle was not assessed. Right ventricular systolic function not assessed.  Right Atrium: The right atrium was not assessed.  Aortic Valve: The aortic valve was not assessed. Aortic valve regurgitation was not assessed.  Mitral Valve: The mitral valve was not assessed. Mitral valve regurgitation was not assessed.  Tricuspid Valve: The tricuspid valve was not assessed. Tricuspid regurgitation was not assessed.  Pulmonic Valve: The pulmonic valve was not assessed. The pulmonic valve regurgitation was not assessed.  Pericardium: Pericardial effusion was not  assessed.  Aorta: The aortic root was not assessed.        CONCLUSIONS:   1. Left ventricular systolic function is low normal with a 50-55% estimated ejection fraction.   2. Apical septal segment, apical anterior segment, apex, and apical inferior segment are abnormal.              [facility] 11/30/23  Recommendations:  Maximize medical therapy.  Agressive risk factor modification efforts.  Follow-up with cardiology clinic.  Outpatient evaluation with vascular medicine.  Consider referral to cardiac rehabilitation.  Aspirin therapy.  Beta blocker therapy.     ____________________________________________________________________________________  CONCLUSIONS:   1. Spontaneous coronary artery dissection in distal LAD.   2. No significant coronary artery disease elsewhere as specified above.  Laboratory values:   Office Visit on 01/09/2024   Component Date Value    Ventricular Rate 01/09/2024 65     Atrial Rate 01/09/2024 65     MS Interval 01/09/2024 170     QRS Duration 01/09/2024 74     QT Interval 01/09/2024 416     QTC Calculation(Bazett) 01/09/2024 432     P Axis 01/09/2024 42     R [place] 01/09/2024 79     T Axis 01/09/2024 62     QRS Count 01/09/2024 11     Q Onset 01/09/2024 222     P Onset 01/09/2024 137     P Offset 01/09/2024 193     T Offset 01/09/2024 430     QTC Fredericia 01/09/2024 427    Hospital Outpatient Visit on 12/22/2023   Component Date Value    LVOT diam 12/22/2023 1.86     LV biplane EF 12/22/2023 52     MV E/A ratio 12/22/2023 0.77     MV avg E/e' ratio 12/22/2023 6.98     Tricuspid annular plane * 12/22/2023 2.6     AV mn grad 12/22/2023 4.7     LA vol index A/L 12/22/2023 25.5     AV pk jarred 12/22/2023 1.40     RV free wall pk S' 12/22/2023 17.66     RVSP 12/22/2023 10.3     LVIDd 12/22/2023 4.85     Aortic Valve Area by Con* 12/22/2023 1.78     Aortic Valve Area by Con* 12/22/2023 2.01     AV pk grad 12/22/2023 7.8     LV A4C EF 12/22/2023 55.6    Office Visit on 12/07/2023   Component Date Value     Ventricular Rate 12/08/2023 64     Atrial Rate 12/08/2023 64     CA Interval 12/08/2023 166     QRS Duration 12/08/2023 78     QT Interval 12/08/2023 384     QTC Calculation(Bazett) 12/08/2023 396     P Axis 12/08/2023 49     R Axis 12/08/2023 95     T Axis 12/08/2023 71     QRS Count 12/08/2023 11     Q Onset 12/08/2023 221     P Onset 12/08/2023 138     P Offset 12/08/2023 193     T Offset 12/08/2023 413     QTC Fredericia 12/08/2023 392    Admission on 11/29/2023, Discharged on 12/03/2023   Component Date Value    WBC 11/29/2023 11.0     nRBC 11/29/2023 0.0     RBC 11/29/2023 4.31     Hemoglobin 11/29/2023 13.2     Hematocrit 11/29/2023 38.1     MCV 11/29/2023 88     MCH 11/29/2023 30.6     MCHC 11/29/2023 34.6     RDW 11/29/2023 12.2     Platelets 11/29/2023 282     Neutrophils % 11/29/2023 66.0     Immature Granulocytes %,* 11/29/2023 0.6     Lymphocytes % 11/29/2023 22.2     Monocytes % 11/29/2023 8.2     Eosinophils % 11/29/2023 2.5     Basophils % 11/29/2023 0.5     Neutrophils Absolute 11/29/2023 7.26     Immature Granulocytes Ab* 11/29/2023 0.07     Lymphocytes Absolute 11/29/2023 2.44     Monocytes Absolute 11/29/2023 0.90     Eosinophils Absolute 11/29/2023 0.28     Basophils Absolute 11/29/2023 0.06     Glucose 11/29/2023 107 (H)     Sodium 11/29/2023 133 (L)     Potassium 11/29/2023 3.4 (L)     Chloride 11/29/2023 102     Bicarbonate 11/29/2023 23     Anion Gap 11/29/2023 11     Urea Nitrogen 11/29/2023 10     Creatinine 11/29/2023 0.77     eGFR 11/29/2023 >90     Calcium 11/29/2023 8.9     Albumin 11/29/2023 4.3     Alkaline Phosphatase 11/29/2023 62     Total Protein 11/29/2023 7.1     AST 11/29/2023 13     Bilirubin, Total 11/29/2023 0.3     ALT 11/29/2023 10     Magnesium 11/29/2023 1.73     Phosphorus 11/29/2023 2.6     BNP 11/29/2023 93     Troponin I, High Sensiti* 11/29/2023 682 (HH)     D-Dimer Non VTE, Quant (* 11/29/2023 1,096 (H)     POCT pH, Venous 11/29/2023 7.48 (H)     POCT pCO2, Venous  11/29/2023 32 (L)     POCT pO2, Venous 11/29/2023 30 (L)     POCT SO2, Venous 11/29/2023 46     POCT Oxy Hemoglobin, Romulo* 11/29/2023 45.0     POCT Hematocrit Calculat* 11/29/2023 41.0     POCT Sodium, Venous 11/29/2023 134 (L)     POCT Potassium, Venous 11/29/2023 3.5     POCT Chloride, Venous 11/29/2023 103     POCT Ionized Calicum, Ve* 11/29/2023 1.13     POCT Glucose, Venous 11/29/2023 113 (H)     POCT Lactate, Venous 11/29/2023 1.4     POCT Base Excess, Venous 11/29/2023 0.9     POCT HCO3 Calculated, Ve* 11/29/2023 23.8     POCT Hemoglobin, Venous 11/29/2023 13.5     POCT Anion Gap, Venous 11/29/2023 11.0     Patient Temperature 11/29/2023 37.0     FiO2 11/29/2023 21     Protime 11/29/2023 11.8     INR 11/29/2023 1.0     aPTT 11/29/2023 34     Troponin I, High Sensiti* 11/29/2023 450 (HH)     LV biplane EF 11/29/2023 58     MV E/A ratio 11/29/2023 0.79     LVIDd 11/29/2023 4.61     LV A4C EF 11/29/2023 56.7     Ventricular Rate 11/30/2023 68     Atrial Rate 11/30/2023 68     WI Interval 11/30/2023 170     QRS Duration 11/30/2023 78     QT Interval 11/30/2023 396     QTC Calculation(Bazett) 11/30/2023 421     P Axis 11/30/2023 37     R [place]ascade 11/30/2023 67     T Cascade 11/30/2023 50     QRS Count 11/30/2023 12     Q Onset 11/30/2023 222     P Onset 11/30/2023 137     P Offset 11/30/2023 192     T Offset 11/30/2023 420     QTC Fredericia 11/30/2023 413     Troponin I, High Sensiti* 11/29/2023 5,818 (HH)     Troponin I, High Sensiti* 11/30/2023 8,570 (HH)     Glucose 11/30/2023 113 (H)     Sodium 11/30/2023 133 (L)     Potassium 11/30/2023 4.2     Chloride 11/30/2023 103     Bicarbonate 11/30/2023 23     Anion Gap 11/30/2023 11     Urea Nitrogen 11/30/2023 8     Creatinine 11/30/2023 0.76     eGFR 11/30/2023 >90     Calcium 11/30/2023 8.5 (L)     Ventricular Rate 11/30/2023 79     Atrial Rate 11/30/2023 79     WI Interval 11/30/2023 160     QRS Duration 11/30/2023 72     QT Interval 11/30/2023 374     QTC  Calculation(Bazett) 11/30/2023 428     P Axis 11/30/2023 37     R Kermit 11/30/2023 46     T Axis 11/30/2023 39     QRS Count 11/30/2023 13     Q Onset 11/30/2023 221     P Onset 11/30/2023 141     P Offset 11/30/2023 188     T Offset 11/30/2023 408     QTC Fredericia 11/30/2023 410     Magnesium 11/30/2023 1.71     Heparin Unfractionated 11/30/2023 0.6     WBC 11/30/2023 13.6 (H)     nRBC 11/30/2023 0.0     RBC 11/30/2023 3.93 (L)     Hemoglobin 11/30/2023 12.1     Hematocrit 11/30/2023 35.0 (L)     MCV 11/30/2023 89     MCH 11/30/2023 30.8     MCHC 11/30/2023 34.6     RDW 11/30/2023 12.4     Platelets 11/30/2023 261     Heparin Unfractionated 11/30/2023 0.6     Ventricular Rate 11/30/2023 74     Atrial Rate 11/30/2023 74     MI Interval 11/30/2023 162     QRS Duration 11/30/2023 76     QT Interval 11/30/2023 378     QTC Calculation(Bazett) 11/30/2023 419     P Axis 11/30/2023 32     R Kermit 11/30/2023 49     T Kermit 11/30/2023 55     QRS Count 11/30/2023 12     Q Onset 11/30/2023 223     P Onset 11/30/2023 142     P Offset 11/30/2023 192     T Offset 11/30/2023 412     QTC Fredericia 11/30/2023 405     Ventricular Rate 11/30/2023 80     Atrial Rate 11/30/2023 80     MI Interval 11/30/2023 158     QRS Duration 11/30/2023 74     QT Interval 11/30/2023 360     QTC Calculation(Bazett) 11/30/2023 415     P Axis 11/30/2023 28     R Kermit 11/30/2023 47     T Axis 11/30/2023 50     QRS Count 11/30/2023 13     Q Onset 11/30/2023 222     P Onset 11/30/2023 143     P Offset 11/30/2023 199     T Offset 11/30/2023 402     QTC Fredericia 11/30/2023 396     Glucose 12/01/2023 96     Sodium 12/01/2023 135 (L)     Potassium 12/01/2023 3.9     Chloride 12/01/2023 107     Bicarbonate 12/01/2023 23     Anion Gap 12/01/2023 9 (L)     Urea Nitrogen 12/01/2023 8     Creatinine 12/01/2023 0.83     eGFR 12/01/2023 87     Calcium 12/01/2023 8.5 (L)     Phosphorus 12/01/2023 3.0     Albumin 12/01/2023 3.7     Troponin I, High Sensiti* 12/01/2023  4,296 (HH)     Magnesium 12/03/2023 1.79     Glucose 12/03/2023 94     Sodium 12/03/2023 136     Potassium 12/03/2023 4.1     Chloride 12/03/2023 106     Bicarbonate 12/03/2023 24     Anion Gap 12/03/2023 10     Urea Nitrogen 12/03/2023 14     Creatinine 12/03/2023 0.78     eGFR 12/03/2023 >90     Calcium 12/03/2023 8.6     POCT Activated Clotting * 11/30/2023 153      CBC -  Lab Results   Component Value Date    WBC 13.6 (H) 11/30/2023    HGB 12.1 11/30/2023    HCT 35.0 (L) 11/30/2023    MCV 89 11/30/2023     11/30/2023       CMP -  Lab Results   Component Value Date    CALCIUM 8.6 12/03/2023    PHOS 3.0 12/01/2023    PROT 7.1 11/29/2023    ALBUMIN 3.7 12/01/2023    AST 13 11/29/2023    ALT 10 11/29/2023    ALKPHOS 62 11/29/2023    BILITOT 0.3 11/29/2023       LIPID PANEL -   Lab Results   Component Value Date    CHOL 184 11/26/2023    HDL 48.5 11/26/2023    CHHDL 3.8 11/26/2023    VLDL 21 11/26/2023    TRIG 105 11/26/2023    NHDL 136 11/26/2023       RENAL FUNCTION PANEL -   Lab Results   Component Value Date    K 4.1 12/03/2023    PHOS 3.0 12/01/2023       Lab Results   Component Value Date    BNP 93 11/29/2023    HGBA1C 5.5 11/26/2023        Assessment/Plan   -Spontaneous coronary dissection  -HLD  -Subsequent NSTEMI      Plan:  -I will continue ASA 81 mg daily. Plavix was discontinued.   -We will continue metoprolol XL 50 mg daily.    -We will continue statin therapy as previously taking  -Will recommend to get sleep apnea eval. Patient to speak with PCP.   -Continue follow-up with vascular medicine team.  -Cardiac rehab eval later this week.   -Patient was instructed when to go to emergency room.  If patient has similar chest pain that does not resolve after 2 nitroglycerin call 911.    No orders of the defined types were placed in this encounter.                SIGNATURE: Gene Hermosillo MD PATIENT NAME: Mandie Arevalo   DATE/TIME: January 30, 2024 11:05 AM MRN: [id number]

## 2024-02-01 ENCOUNTER — CLINICAL SUPPORT (OUTPATIENT)
Dept: CARDIAC REHAB | Facility: HOSPITAL | Age: 49
End: 2024-02-01
Payer: COMMERCIAL

## 2024-02-01 VITALS
SYSTOLIC BLOOD PRESSURE: 116 MMHG | BODY MASS INDEX: 32.53 KG/M2 | HEART RATE: 83 BPM | RESPIRATION RATE: 18 BRPM | WEIGHT: 183.6 LBS | HEIGHT: 63 IN | OXYGEN SATURATION: 98 % | DIASTOLIC BLOOD PRESSURE: 70 MMHG

## 2024-02-01 DIAGNOSIS — I21.4 NSTEMI (NON-ST ELEVATION MYOCARDIAL INFARCTION) (MULTI): ICD-10-CM

## 2024-02-01 DIAGNOSIS — I21.4 NSTEMI (NON-ST ELEVATED MYOCARDIAL INFARCTION) (MULTI): ICD-10-CM

## 2024-02-01 ASSESSMENT — ENCOUNTER SYMPTOMS
DEPRESSION: 0
OCCASIONAL FEELINGS OF UNSTEADINESS: 0
LOSS OF SENSATION IN FEET: 0

## 2024-02-01 ASSESSMENT — PATIENT HEALTH QUESTIONNAIRE - PHQ9
1. LITTLE INTEREST OR PLEASURE IN DOING THINGS: NOT AT ALL
SUM OF ALL RESPONSES TO PHQ9 QUESTIONS 1 AND 2: 0
2. FEELING DOWN, DEPRESSED OR HOPELESS: NOT AT ALL

## 2024-02-01 ASSESSMENT — PAIN - FUNCTIONAL ASSESSMENT: PAIN_FUNCTIONAL_ASSESSMENT: 0-10

## 2024-02-01 NOTE — PROGRESS NOTES
Cardiac Rehabilitation Initial Treatment Plan    Name: Mandie Arevalo  Medical Record Number: [id number]  YOB: 1975  Age: 48 y.o.    Today’s Date: 2/1/2024  Primary Care Physician: Sam Rubio MD  Referring Physician: Gene Hermosillo MD  Program Location: [facility]  Primary Diagnosis:   1. NSTEMI (non-ST elevation myocardial infarction) (CMS/[facility])  Referral to Cardiac Rehab         Onset/Date of Diagnosis: 11/25/2023    Initial Assessment, not yet started program.    AACVPR Risk Stratification: High    Falls Risk: Low  Psychosocial Assessment: Mandie completed a PHQ-2 assessment with a score of (+0). A PHQ-9 was not indicated at this time.AB        Sent PH-Q 9 to MD if score > 20: Survey not yet completed.    Pt reported/currently experiencing stress: No  Patient uses stress management skills: Yes   History of: no history of anxiety or depression  Currently seeing a mental health provider: No  Social Support: Yes, Whom:Family and Friends  Quality of Life Survey:  PHQ-2  Learning Assessment:  Learning assessment/barriers: None  Preferred learning method: Auditory and Visual  Barriers: None  Comments:    Stages of Change:Contemplation    Psychosocial Plan    Goal Status: Initial Assessment; goals not yet started    Psychosocial Goals: Identify strategies for managing depression and Identify social supports  Learn to use stress management techniques  Improve or maintain quality of life survey      Psychosocial Interventions/Education: To be done in Cardiac Rehab.  Encourage attendance for stress management lecture.  Instruct patient on 3 minute breathing space or mindful breathing technique.  Patient approached about seeing a mental healthcare provider.      Nutrition Assessment:    Hyperlipidemia: Yes     Lipids:   Lab Results   Component Value Date    CHOL 184 11/26/2023    HDL 48.5 11/26/2023    TRIG 105 11/26/2023       Current Dietary Guidelines: Low fat, Low  "sodium  Barriers to dietary change: no    Diet Habit Survey: Fat Blocking Screener Given  Pre:   Post:     Diabetes Assessment    Lab Results   Component Value Date    HGBA1C 5.5 11/26/2023       History of Diabetes: No    Weight Management    Height: 160 cm (5' 3\")  Weight: 83.3 kg (183 lb 9.6 oz)  BMI (Calculated): 32.53  No data recorded    Nutrition Plan    Goal Status: Initial Assessment; goals not yet started    Nutrition Goals: Lipid Goal: HDL>45, LDL <70, Total <180, Trigs <150, Adapt a Mediterranean focused diet prior to discharge, and Lose 1lb/week while enrolled in program  Achieve a BMI between 20-25%  Lower BMI to <31% by program completion    Nutrition Interventions/Education:   To be done in Cardiac Rehab.  Encourage attending educational lectures  Booklet given \"Heart Attack-Bouncing Back\"  Encourage patient to follow a heart healthy diet  Encouraged to meet with a  out patient dietician  Lipid profile reviewed   Fat Block Screener Given.        Exercise Assessment    No  Mode: NA  Frequency: NA  Duration: NA    Exercise Prescription     Exercise Prescription based on: 6 Minute Walk Test    Pre: 1465ft / 2.8 mph / 3.1 mets          Frequency:  3 days/week   Mode: Treadmill, Recumbent Cycle, Arm Ergometer, and SciFit   Duration: 40 total aerobic minutes   Intensity: RPE 11-14  Target HR:   103-141  MET Level: 3-6  Patient wears supplemental O2: No     Modality Workload METs Duration (minutes)   1 Pre-Exercise   5 minutes   2 SFS 1   6 :00   3 Treadmill 2.2   6 :00   4 UEE 5 watson   6 :00   5 Recumbent Bike 2   6 :00   6 Post-Exercise   5 minutes     Resistance Training:  Will Instruct  Home Exercise Prescription given: To be given prior to discharge from program.    Exercise Plan    Goal Status: Initial Assessment; goals not yet started    Exercise Goals: Increase total exercise duration to 30-45 minutes, Obtain 150 minutes/week of moderate intensity aerobic exercise, and Establish a home exercise " program before discharge  Maintain 150 minutes of aerobic exercise per week with Met levels ranging from 3-6 Mets by the end of the program.  Exercising most days of the week for 30 minutes a session.  Muscle toning 2-3 days per week    Exercise Interventions/Education:   To be done in Cardiac Rehab.  Exercise prescription based on 6 MWT  Exercise changes made based on HR an RPE scale in response to exercise  Increase exercise duration by 1-2 minutes per modality for a total of 40 minutes per session  Target goal for duration 40 minutes per session, increase Met levels by 5-10% every 30 days or as tolerated  Encouraged home exercise on days not at rehab        Other Core Components/Risk Factor Assessment:    Medication adherence  Current Medications:   Medication Documentation Review Audit       Reviewed by Elizabeth Talavera RN (Registered Nurse) on 24 at 0926      Medication Order Taking? Sig Documenting Provider Last Dose Status   acetaminophen (Tylenol) 325 mg tablet [id number] Yes Take 2 tablets (650 mg) by mouth every 4 hours if needed. Historical Provider, MD Taking Active   aspirin 81 mg chewable tablet [id number]  Chew 1 tablet (81 mg) once daily. Jeremi OLIVAS MD   24 2359   atorvastatin (Lipitor) 40 mg tablet [id number]  Take 1 tablet (40 mg) by mouth once daily at bedtime. Jeremi OLIVAS MD   24 2359    Patient not taking:   Discontinued 24 1101   metoprolol succinate XL (Toprol-XL) 50 mg 24 hr tablet [id number] Yes Take 1 tablet (50 mg) by mouth once daily. Do not crush or chew. Bibiana Hudson MD Taking Active    Patient not taking:   Discontinued 24 1044   nitroglycerin (Nitrostat) 0.4 mg SL tablet [id number] Yes Place 1 tablet (0.4 mg) under the tongue every 5 minutes if needed for chest pain for up to 5 doses. Bibiana Hudson MD Taking Active   triamcinolone (Kenalog) 0.1 % cream [id number] Yes APPLY AS NEEDED TWICE DAILY TO FLARES Historical Provider, MD  Taking Active                                 Medication compliance: Yes   Uses pill box/organizer: Yes    Carries medication list: Yes     Blood Pressure Management  History of Hypertension: No   Medication Changes: No   Resting BP:  Visit Vitals  /70 (BP Location: Right arm, Patient Position: Sitting) Comment (BP Location): Blood pressure also taken Right Arm while  standing 126/72   Pulse 83   Resp 18        Heart Failure Management  Hx of Heart Failure: No    Smoking/Tobacco Assessment  Social History     Tobacco Use   Smoking Status Never   Smokeless Tobacco Never       Other Core Component Plan    Goal Status: Initial Assessment; goals not yet started    Other Core Component Goals: Medication compliance and Achieve resting BP of < 130/80 by discharge  Encourage patient to be knowledgeable on medication usage and actions by discharge.  Encourage patient to carry an updated medication list      Other Core Component Interventions/Education:   Carries updated medication list.  Check resting blood pressures pre and post exercise  Following a low sodium/2300 mg sodium diet  Medication list provided  Reviewed effects of exercise on blood pressures    Individual Patient Goals:    To develop a heart healthy exercise routine of 150 minutes per week with Met levels greater than 3 by the end of the program, and be able to return to completing required daily tasks at home without symptoms  To lose 1 lb per week while enrolled in the cardiac rehab program.     Goal Status: Initial Assessment; goals not yet started    Staff Comments:  Reviewed exercise time, Met levels, and RPE scale of heart healthy exercise.AB    Rehab Staff Signature: Elizabeth Talavera RN

## 2024-02-02 RX ORDER — NAPROXEN SODIUM 220 MG/1
81 TABLET, FILM COATED ORAL DAILY
Qty: 90 TABLET | Refills: 3 | Status: SHIPPED | OUTPATIENT
Start: 2024-02-02

## 2024-02-02 RX ORDER — ATORVASTATIN CALCIUM 40 MG/1
40 TABLET, FILM COATED ORAL NIGHTLY
Qty: 90 TABLET | Refills: 3 | Status: SHIPPED | OUTPATIENT
Start: 2024-02-02

## 2024-02-08 ENCOUNTER — TELEPHONE (OUTPATIENT)
Dept: CARDIOLOGY | Facility: CLINIC | Age: 49
End: 2024-02-08
Payer: COMMERCIAL

## 2024-02-08 NOTE — TELEPHONE ENCOUNTER
2/8/24  1031  Called pharmacy and per pharmacy the medications were picked up on 2/3/24 by the patient.    ----- Message from Gene Hermosillo MD sent at 2/5/2024 11:42 AM EST -----  Regarding: FW: Refills  Contact: [phone]  Any update?  ----- Message -----  From: Ryley Cecil  Sent: 1/31/2024   9:51 AM EST  To: Gene Hermosillo MD  Subject: FW: Refills                                        ----- Message -----  From: Mandie Arevalo  Sent: 1/31/2024   9:34 AM EST  To: [facility] Clinical Support  Subject: Refills                                          Morning.  I checked with [facility] pharmacy in [place] about my refills for the aspirin and the lipitor and neither are there. Can we check to see where they were sent? Thanks. Have a good day.

## 2024-02-12 ENCOUNTER — APPOINTMENT (OUTPATIENT)
Dept: CARDIAC REHAB | Facility: HOSPITAL | Age: 49
End: 2024-02-12
Payer: COMMERCIAL

## 2024-02-12 ENCOUNTER — CLINICAL SUPPORT (OUTPATIENT)
Dept: CARDIAC REHAB | Facility: HOSPITAL | Age: 49
End: 2024-02-12
Payer: COMMERCIAL

## 2024-02-12 DIAGNOSIS — I21.4 NON-ST ELEVATION MYOCARDIAL INFARCTION (NSTEMI) (MULTI): ICD-10-CM

## 2024-02-12 PROCEDURE — 93798 PHYS/QHP OP CAR RHAB W/ECG: CPT | Performed by: INTERNAL MEDICINE

## 2024-02-14 ENCOUNTER — CLINICAL SUPPORT (OUTPATIENT)
Dept: CARDIAC REHAB | Facility: HOSPITAL | Age: 49
End: 2024-02-14
Payer: COMMERCIAL

## 2024-02-14 DIAGNOSIS — I21.4 NON-ST ELEVATION MYOCARDIAL INFARCTION (NSTEMI) (MULTI): ICD-10-CM

## 2024-02-14 PROCEDURE — 93798 PHYS/QHP OP CAR RHAB W/ECG: CPT | Performed by: INTERNAL MEDICINE

## 2024-02-16 ENCOUNTER — CLINICAL SUPPORT (OUTPATIENT)
Dept: CARDIAC REHAB | Facility: HOSPITAL | Age: 49
End: 2024-02-16
Payer: COMMERCIAL

## 2024-02-16 DIAGNOSIS — I21.4 NON-ST ELEVATION MYOCARDIAL INFARCTION (NSTEMI) (MULTI): ICD-10-CM

## 2024-02-16 PROCEDURE — 93798 PHYS/QHP OP CAR RHAB W/ECG: CPT | Performed by: INTERNAL MEDICINE

## 2024-02-19 ENCOUNTER — CLINICAL SUPPORT (OUTPATIENT)
Dept: CARDIAC REHAB | Facility: HOSPITAL | Age: 49
End: 2024-02-19
Payer: COMMERCIAL

## 2024-02-19 DIAGNOSIS — I21.4 NON-ST ELEVATION MYOCARDIAL INFARCTION (NSTEMI) (MULTI): ICD-10-CM

## 2024-02-19 PROCEDURE — 93798 PHYS/QHP OP CAR RHAB W/ECG: CPT | Performed by: INTERNAL MEDICINE

## 2024-02-20 ENCOUNTER — APPOINTMENT (OUTPATIENT)
Dept: CARDIOLOGY | Facility: HOSPITAL | Age: 49
End: 2024-02-20
Payer: COMMERCIAL

## 2024-02-21 ENCOUNTER — CLINICAL SUPPORT (OUTPATIENT)
Dept: CARDIAC REHAB | Facility: HOSPITAL | Age: 49
End: 2024-02-21
Payer: COMMERCIAL

## 2024-02-21 DIAGNOSIS — I21.4 NON-ST ELEVATION MYOCARDIAL INFARCTION (NSTEMI) (MULTI): ICD-10-CM

## 2024-02-21 PROCEDURE — 93798 PHYS/QHP OP CAR RHAB W/ECG: CPT | Performed by: INTERNAL MEDICINE

## 2024-02-23 ENCOUNTER — CLINICAL SUPPORT (OUTPATIENT)
Dept: CARDIAC REHAB | Facility: HOSPITAL | Age: 49
End: 2024-02-23
Payer: COMMERCIAL

## 2024-02-23 DIAGNOSIS — I21.4 NON-ST ELEVATION MYOCARDIAL INFARCTION (NSTEMI) (MULTI): ICD-10-CM

## 2024-02-23 PROCEDURE — 93798 PHYS/QHP OP CAR RHAB W/ECG: CPT | Performed by: INTERNAL MEDICINE

## 2024-02-26 ENCOUNTER — CLINICAL SUPPORT (OUTPATIENT)
Dept: CARDIAC REHAB | Facility: HOSPITAL | Age: 49
End: 2024-02-26
Payer: COMMERCIAL

## 2024-02-26 DIAGNOSIS — I21.4 NON-ST ELEVATION MYOCARDIAL INFARCTION (NSTEMI) (MULTI): ICD-10-CM

## 2024-02-26 PROCEDURE — 93798 PHYS/QHP OP CAR RHAB W/ECG: CPT | Performed by: INTERNAL MEDICINE

## 2024-02-27 ENCOUNTER — OFFICE VISIT (OUTPATIENT)
Dept: CARDIOLOGY | Facility: HOSPITAL | Age: 49
End: 2024-02-27
Payer: COMMERCIAL

## 2024-02-27 VITALS
WEIGHT: 191 LBS | SYSTOLIC BLOOD PRESSURE: 136 MMHG | OXYGEN SATURATION: 98 % | HEIGHT: 65 IN | BODY MASS INDEX: 31.82 KG/M2 | DIASTOLIC BLOOD PRESSURE: 83 MMHG | HEART RATE: 77 BPM

## 2024-02-27 DIAGNOSIS — R53.83 OTHER FATIGUE: ICD-10-CM

## 2024-02-27 DIAGNOSIS — R06.83 SNORING: ICD-10-CM

## 2024-02-27 DIAGNOSIS — I25.42 SPONTANEOUS DISSECTION OF CORONARY ARTERY: ICD-10-CM

## 2024-02-27 DIAGNOSIS — I25.2 HX OF NON-ST ELEVATION MYOCARDIAL INFARCTION (NSTEMI): ICD-10-CM

## 2024-02-27 DIAGNOSIS — F41.9 ANXIETY: ICD-10-CM

## 2024-02-27 DIAGNOSIS — R07.9 ACUTE CHEST PAIN: ICD-10-CM

## 2024-02-27 PROCEDURE — 99215 OFFICE O/P EST HI 40 MIN: CPT | Performed by: INTERNAL MEDICINE

## 2024-02-27 PROCEDURE — 93010 ELECTROCARDIOGRAM REPORT: CPT | Performed by: INTERNAL MEDICINE

## 2024-02-27 PROCEDURE — 1036F TOBACCO NON-USER: CPT | Performed by: INTERNAL MEDICINE

## 2024-02-27 PROCEDURE — 93005 ELECTROCARDIOGRAM TRACING: CPT | Performed by: INTERNAL MEDICINE

## 2024-02-27 NOTE — PATIENT INSTRUCTIONS
Continue medications as prescribed.  Ordered home sleep study.  Ordered blood work - we will reach out with results.  Ordered stress ECHO - we will reach out with results.  Referral to psychology for anxiety.

## 2024-02-27 NOTE — LETTER
February 27, 2024     Sam Rubio MD  [address] [facility] [id number]    Patient: Mandie Arevalo   YOB: 1975   Date of Visit: 2/27/2024       Dear Dr. Sam Rubio MD:    Thank you for referring Mandie Arevalo to me for evaluation. Below are my notes for this consultation.  If you have questions, please do not hesitate to call me. I look forward to following your patient along with you.       Sincerely,     Bibiana Hudson MD      CC: No Recipients  ______________________________________________________________________________________    02/27/24  2:09 PM    Vascular Medicine - FMD/Arterial Dissection Program    This 48 y.o. woman is seen in follow-up of LAD SCAD 11.2023.    She had little past medical history -- no hx of migraine  headaches, any prior CV disease, or hypertension. No hx of inflammatory disorders, thyroid disorder, or hyperlipidemia.    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  It progressed and was associated with emesis.  She went to [place] ER,  Initial HR 79 beats per minute, /85 mm Hg.  CT scan chest/abd/pelvis done to r/o dissection and PE (both not present) and initial hs troponin 121 -- 352. Initial ECG demonstrated  Minor T wave abnormality inferiorly c/w NSTEMI.  She was heparinized and taken for urgent coronary angiography done via right radial approach which found tapering stenosis in the mid/distal LAD c/w type SCAD which was managed conservatively.  Her LV gram showed AK/DK apex with an appearance that could be c/w Takotsubo. Heparin was discontinued and she was on aspirin  + Brilinta, then aspirin + Plavix as well as statin Rx and metoprolol.  Echo prior to discharge on 11.27 with LVEF 55% with report of no RWMA.  Peak hs troponin was 1334.    She was discharged home on 11.27.2023 on DAPT and metoprolol/statin Rx and felt fine initially,  "doing light errands, but then returned on 11.29 with onset of recurrent chest discomfort awakening her from sleep.  ECG in ER with slight ST elevation in infero lateral leads (< 1 mm). She was readmitted and reheparinized, started on isordil as well as B Blocker. Repeat coronary angiogram done the following day with progression of the LAD SCAD with occlusion distally; this was managed conservatively.  On repeat ECG, this does look like a possible STEMI/near STEMI.  She had a re-rise/reinfarction with troponin at 682 initially (down from discharge troponin 725) peaking at 8570. She was discharged home on 12.3.      I have been following Ms. Arevalo since December. We had adjusted her medical Rx and obtained imaging for FMD -- which she does not have.    Since I saw her in January, she is off Plavix on aspirin alone. Bps generally OK at home. She is having a lot of health issues:    She continues to feel fatigued, though napping less during the day.    She has poor quality sleep; her  notices some snoring.    She has had episodes of chest discomfort that is \"fleeting\", can last few minutes at a time. Not classically exertional. She has taken nitroglycerin a few times; does not always help.     She has intermittent flushing/redness of her face.    She has not yet been able to get into medical genetics.    She and her  are somewhat alarmed by some social media content in SCAD survivors forums that suggest that chronic fatigue will be an expected part of our course.    She has had recurrent headaches.    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     • aspirin 81 mg chewable tablet Chew 1 tablet (81 mg) once daily. 90 tablet 3   • atorvastatin (Lipitor) 40 mg tablet Take 1 tablet (40 mg) by mouth once daily at bedtime. 90 tablet 3   • metoprolol succinate XL (Toprol-XL) 50 mg 24 hr tablet Take 1 tablet (50 mg) by mouth once daily. Do not crush or chew. 90 tablet 3   • nitroglycerin (Nitrostat) 0.4 mg SL tablet Place 1 tablet (0.4 mg) under the tongue every 5 minutes if needed for chest pain for up to 5 doses. 5 tablet 3   • triamcinolone (Kenalog) 0.1 % cream APPLY AS NEEDED TWICE DAILY TO FLARES       No current facility-administered medications for this visit.       Patient Vitals for the past 24 hrs:   BP Pulse SpO2 Height Weight   02/27/24 1330 123/80 77 98 % 1.651 m (5' 5\") 86.6 kg (191 lb)   HEENT benign  JVP flat  No carotid bruits  +S1, S2, RRR, no m/r/g  Clear lungs  Abd benign,  Brisk LE pulses at ankle    ECG reviewed NSR at 77 beats per minute  T wave inversions across precordium have improved.    12.2023 Echo done, low normal LVEF, apical RWMA  CONCLUSIONS:   1. Left ventricular systolic function is low normal with a 50-55% estimated ejection fraction.   2. [place] and apical septal segment are abnormal.   3. Spectral Doppler shows an impaired relaxation pattern of left ventricular diastolic filling.    12.2023 CTA head/neck personally reviewed  Right ICA s-curve, tortuosity, left ICA loop, but no classical FMD  No IC aneurysms    Prior catheter angiogram review  I do think here original angiogram shows a mid/distal LAD lesion c/w type II SCAD with APRIL " "III flow with more severe narrowing of the distal LAD on subsequent angiogram    11.2023 CTA c/a/p   No FMD or visceral/aortic aneurysms    11.29.2023 Echo  LVEF 50-55%  Apical septal, apical anterior, apex, apical inferior RWMA    Assessment/Plan/Recommendations:  48 y.o. woman with no prior cardiac history with initial NSTEMI with LAD lesion c/w type II spontaneous coronary artery dissection; LV gram with a Takotsubo appearance which has been reported in LAD SCAD. She was discharged home then had a new MI event/extension/progression of the LAD lesion. She has recovered from this. She has normal LVEF and apical RWMA.  She has no clear FMD.    We touched on many things today.    From a SCAD management perspective, she is on aspirin alone. She will continue metoprolol at present dose and BP well controlled.  No significant LV dysfunction; would hold on Ace/ARB or MRA.      She is having recurrent chest pains that are causing anxiety.  I communicated with Dr. Hermosillo; we will obtain exercise Echo which will also assess RWMA, r/o apical aneurysm.    She will continue cardiac rehab.    Continue statin for now; revisit lipids (this was a non atherosclerotic MI).    She does not have apparent FMD. She has ICA tortuosity and family hx of aortic disease in paternal grandmother).  I will refer to medical genetics to offer arteriopathy panel.    She is having poor sleep quality and does have intermittent snoring. We will range for a home sleep study.    For now chronic fatigue, I will check TSH, CBC. Chronic fatigue is not a typical manifestation pst SCAD.    I wonder if she could be developing early vasomotor sx/hot flashes with \"flushing\", but she also seems to have much anxiety/stress related to her dx. Will monitor.    I think Ms. Arevalo would benefit from psychology support/mental health support. We have seen some of our patients with a stress/almost PTSD like reaction to a sudden SCAD event. WE will refer.    RTC 3 " months; interval check in on labs, stress test, sleep study, psychology referral.    I spent > 45 minutes in today's visit including coordination of above care, patient education, discussion.    Bibiana Hudson MD

## 2024-02-27 NOTE — PROGRESS NOTES
02/27/24  2:09 PM    Vascular Medicine - FMD/Arterial Dissection Program    This 48 y.o. woman is seen in follow-up of LAD SCAD 11.2023.    She had little past medical history -- no hx of migraine  headaches, any prior CV disease, or hypertension. No hx of inflammatory disorders, thyroid disorder, or hyperlipidemia.    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  It progressed and was associated with emesis.  She went to [place] ER,  Initial HR 79 beats per minute, /85 mm Hg.  CT scan chest/abd/pelvis done to r/o dissection and PE (both not present) and initial hs troponin 121 -- 352. Initial ECG demonstrated  Minor T wave abnormality inferiorly c/w NSTEMI.  She was heparinized and taken for urgent coronary angiography done via right radial approach which found tapering stenosis in the mid/distal LAD c/w type SCAD which was managed conservatively.  Her LV gram showed AK/DK apex with an appearance that could be c/w Takotsubo. Heparin was discontinued and she was on aspirin  + Brilinta, then aspirin + Plavix as well as statin Rx and metoprolol.  Echo prior to discharge on 11.27 with LVEF 55% with report of no RWMA.  Peak hs troponin was 1334.    She was discharged home on 11.27.2023 on DAPT and metoprolol/statin Rx and felt fine initially, doing light errands, but then returned on 11.29 with onset of recurrent chest discomfort awakening her from sleep.  ECG in ER with slight ST elevation in infero lateral leads (< 1 mm). She was readmitted and reheparinized, started on isordil as well as B Blocker. Repeat coronary angiogram done the following day with progression of the LAD SCAD with occlusion distally; this was managed conservatively.  On repeat ECG, this does look like a possible STEMI/near STEMI.  She had a re-rise/reinfarction with troponin at 682 initially (down from discharge troponin 725) peaking at 8570. She was  "discharged home on 12.3.      I have been following Ms. Arevalo since December. We had adjusted her medical Rx and obtained imaging for FMD -- which she does not have.    Since I saw her in January, she is off Plavix on aspirin alone. Bps generally OK at home. She is having a lot of health issues:    She continues to feel fatigued, though napping less during the day.    She has poor quality sleep; her  notices some snoring.    She has had episodes of chest discomfort that is \"fleeting\", can last few minutes at a time. Not classically exertional. She has taken nitroglycerin a few times; does not always help.     She has intermittent flushing/redness of her face.    She has not yet been able to get into medical genetics.    She and her  are somewhat alarmed by some social media content in SCAD survivors forums that suggest that chronic fatigue will be an expected part of our course.    She has had recurrent headaches.    She is doing cardiac rehab; progressing slowly,    Past Medical History:   Diagnosis Date    Fatigue     NSTEMI (non-ST elevated myocardial infarction) (CMS/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      aspirin 81 mg chewable tablet Chew 1 tablet (81 mg) once daily. 90 tablet 3    atorvastatin (Lipitor) 40 mg tablet Take 1 tablet (40 mg) by mouth once daily at bedtime. 90 tablet 3    metoprolol " "succinate XL (Toprol-XL) 50 mg 24 hr tablet Take 1 tablet (50 mg) by mouth once daily. Do not crush or chew. 90 tablet 3    nitroglycerin (Nitrostat) 0.4 mg SL tablet Place 1 tablet (0.4 mg) under the tongue every 5 minutes if needed for chest pain for up to 5 doses. 5 tablet 3    triamcinolone (Kenalog) 0.1 % cream APPLY AS NEEDED TWICE DAILY TO FLARES       No current facility-administered medications for this visit.       Patient Vitals for the past 24 hrs:   BP Pulse SpO2 Height Weight   02/27/24 1330 123/80 77 98 % 1.651 m (5' 5\") 86.6 kg (191 lb)   HEENT benign  JVP flat  No carotid bruits  +S1, S2, RRR, no m/r/g  Clear lungs  Abd benign,  Brisk LE pulses at ankle    ECG reviewed NSR at 77 beats per minute  T wave inversions across precordium have improved.    12.2023 Echo done, low normal LVEF, apical RWMA  CONCLUSIONS:   1. Left ventricular systolic function is low normal with a 50-55% estimated ejection fraction.   2. [place] and apical septal segment are abnormal.   3. Spectral Doppler shows an impaired relaxation pattern of left ventricular diastolic filling.    12.2023 CTA head/neck personally reviewed  Right ICA s-curve, tortuosity, left ICA loop, but no classical FMD  No IC aneurysms    Prior catheter angiogram review  I do think here original angiogram shows a mid/distal LAD lesion c/w type II SCAD with APRIL III flow with more severe narrowing of the distal LAD on subsequent angiogram    11.2023 CTA c/a/p   No FMD or visceral/aortic aneurysms    11.29.2023 Echo  LVEF 50-55%  Apical septal, apical anterior, apex, apical inferior RWMA    Assessment/Plan/Recommendations:  48 y.o. woman with no prior cardiac history with initial NSTEMI with LAD lesion c/w type II spontaneous coronary artery dissection; LV gram with a Takotsubo appearance which has been reported in LAD SCAD. She was discharged home then had a new MI event/extension/progression of the LAD lesion. She has recovered from this. She has normal " "LVEF and apical RWMA.  She has no clear FMD.    We touched on many things today.    From a SCAD management perspective, she is on aspirin alone. She will continue metoprolol at present dose and BP well controlled.  No significant LV dysfunction; would hold on Ace/ARB or MRA.      She is having recurrent chest pains that are causing anxiety.  I communicated with Dr. Hermosillo; we will obtain exercise Echo which will also assess RWMA, r/o apical aneurysm.    She will continue cardiac rehab.    Continue statin for now; revisit lipids (this was a non atherosclerotic MI).    She does not have apparent FMD. She has ICA tortuosity and family hx of aortic disease in paternal grandmother).  I will refer to medical genetics to offer arteriopathy panel.    She is having poor sleep quality and does have intermittent snoring. We will range for a home sleep study.    For now chronic fatigue, I will check TSH, CBC. Chronic fatigue is not a typical manifestation pst SCAD.    I wonder if she could be developing early vasomotor sx/hot flashes with \"flushing\", but she also seems to have much anxiety/stress related to her dx. Will monitor.    I think Ms. Arevalo would benefit from psychology support/mental health support. We have seen some of our patients with a stress/almost PTSD like reaction to a sudden SCAD event. WE will refer.    RTC 3 months; interval check in on labs, stress test, sleep study, psychology referral.    I spent > 45 minutes in today's visit including coordination of above care, patient education, discussion.    Bibiana Hudson MD          "

## 2024-02-28 ENCOUNTER — CLINICAL SUPPORT (OUTPATIENT)
Dept: CARDIAC REHAB | Facility: HOSPITAL | Age: 49
End: 2024-02-28
Payer: COMMERCIAL

## 2024-02-28 DIAGNOSIS — I21.4 NON-ST ELEVATION MYOCARDIAL INFARCTION (NSTEMI) (MULTI): ICD-10-CM

## 2024-02-28 PROCEDURE — 93798 PHYS/QHP OP CAR RHAB W/ECG: CPT | Performed by: INTERNAL MEDICINE

## 2024-02-29 ENCOUNTER — OFFICE VISIT (OUTPATIENT)
Dept: GENETICS | Facility: CLINIC | Age: 49
End: 2024-02-29
Payer: COMMERCIAL

## 2024-02-29 ENCOUNTER — DOCUMENTATION (OUTPATIENT)
Dept: CARDIAC REHAB | Facility: HOSPITAL | Age: 49
End: 2024-02-29

## 2024-02-29 ENCOUNTER — LAB (OUTPATIENT)
Dept: LAB | Facility: LAB | Age: 49
End: 2024-02-29
Payer: COMMERCIAL

## 2024-02-29 VITALS
WEIGHT: 192 LBS | RESPIRATION RATE: 18 BRPM | HEIGHT: 63 IN | SYSTOLIC BLOOD PRESSURE: 118 MMHG | DIASTOLIC BLOOD PRESSURE: 84 MMHG | HEART RATE: 83 BPM | TEMPERATURE: 97.3 F | BODY MASS INDEX: 34.02 KG/M2

## 2024-02-29 DIAGNOSIS — I25.42 SPONTANEOUS DISSECTION OF CORONARY ARTERY: ICD-10-CM

## 2024-02-29 DIAGNOSIS — R53.83 OTHER FATIGUE: ICD-10-CM

## 2024-02-29 DIAGNOSIS — I25.2 HX OF NON-ST ELEVATION MYOCARDIAL INFARCTION (NSTEMI): ICD-10-CM

## 2024-02-29 DIAGNOSIS — I25.42 SPONTANEOUS DISSECTION OF CORONARY ARTERY: Primary | ICD-10-CM

## 2024-02-29 DIAGNOSIS — Z80.0 FAMILY HISTORY OF PANCREATIC CANCER: ICD-10-CM

## 2024-02-29 LAB
ANION GAP SERPL CALC-SCNC: 13 MMOL/L (ref 10–20)
BUN SERPL-MCNC: 11 MG/DL (ref 6–23)
CALCIUM SERPL-MCNC: 9.5 MG/DL (ref 8.6–10.6)
CHLORIDE SERPL-SCNC: 105 MMOL/L (ref 98–107)
CO2 SERPL-SCNC: 26 MMOL/L (ref 21–32)
CREAT SERPL-MCNC: 0.78 MG/DL (ref 0.5–1.05)
EGFRCR SERPLBLD CKD-EPI 2021: >90 ML/MIN/1.73M*2
ERYTHROCYTE [DISTWIDTH] IN BLOOD BY AUTOMATED COUNT: 13 % (ref 11.5–14.5)
GLUCOSE SERPL-MCNC: 91 MG/DL (ref 74–99)
HCT VFR BLD AUTO: 38.6 % (ref 36–46)
HGB BLD-MCNC: 12.5 G/DL (ref 12–16)
MCH RBC QN AUTO: 29.6 PG (ref 26–34)
MCHC RBC AUTO-ENTMCNC: 32.4 G/DL (ref 32–36)
MCV RBC AUTO: 92 FL (ref 80–100)
NRBC BLD-RTO: 0 /100 WBCS (ref 0–0)
PLATELET # BLD AUTO: 276 X10*3/UL (ref 150–450)
POTASSIUM SERPL-SCNC: 4.2 MMOL/L (ref 3.5–5.3)
RBC # BLD AUTO: 4.22 X10*6/UL (ref 4–5.2)
SODIUM SERPL-SCNC: 140 MMOL/L (ref 136–145)
TSH SERPL-ACNC: 2.18 MIU/L (ref 0.44–3.98)
WBC # BLD AUTO: 6.8 X10*3/UL (ref 4.4–11.3)

## 2024-02-29 PROCEDURE — 1036F TOBACCO NON-USER: CPT | Performed by: INTERNAL MEDICINE

## 2024-02-29 PROCEDURE — 84443 ASSAY THYROID STIM HORMONE: CPT

## 2024-02-29 PROCEDURE — 99205 OFFICE O/P NEW HI 60 MIN: CPT | Performed by: INTERNAL MEDICINE

## 2024-02-29 PROCEDURE — 80048 BASIC METABOLIC PNL TOTAL CA: CPT

## 2024-02-29 PROCEDURE — 36415 COLL VENOUS BLD VENIPUNCTURE: CPT

## 2024-02-29 PROCEDURE — 85027 COMPLETE CBC AUTOMATED: CPT

## 2024-02-29 NOTE — LETTER
02/29/24    Bibiana Hudson MD  [address] [facility] [id number]      Dear Dr. Bibiana Hudson MD,    Thank you for referring your patient, Mandie Arevalo, to receive care in my office. I have enclosed a summary of the care provided to Mandie on 02/29/24.    Please contact me with any questions you may have regarding the visit.    Sincerely,         Kevan Austin [place]  [address] [facility] [address] [id number]    CC: No Recipients

## 2024-02-29 NOTE — PROGRESS NOTES
Cardiac Rehabilitation 30 Day Reassessment    Name: Mandie Arevalo  Medical Record Number: [id number]  YOB: 1975  Age: 48 y.o.  Session: 9  Today’s Date: 2/29/2024  Primary Care Physician: Sam Rubio MD  Referring Physician: Gene Hermosillo  Program Location: [facility]  Primary Diagnosis: NSTEMI       Onset/Date of Diagnosis: 11/25/2023        AACVPR Risk Stratification: High    Falls Risk: Low  Psychosocial Assessment: Mandie completed a PHQ-2 assessment with a score of (+0). A PHQ-9 was not indicated at this time.AB  2/29/2024 30 Day Assessment: Mandie was encouraged to meet with Ger Pickard the staff psychologist and she is contemplating meeting with him. AB     Sent PH-Q 9 to MD if score > 20: Survey not yet completed.    Pt reported/currently experiencing stress: No  Patient uses stress management skills: Yes   History of: no history of anxiety or depression  Currently seeing a mental health provider: No  Social Support: Yes, Whom:Family and Friends  Quality of Life Survey:  PHQ-2  Learning Assessment:  Learning assessment/barriers: None  Preferred learning method: Auditory and Visual  Barriers: None  Comments:    Stages of Change:Action    Psychosocial Plan    Goal Status: In progress    Psychosocial Goals: Identify strategies for managing depression and Identify social supports  Learn to use stress management techniques  Improve or maintain quality of life survey      Psychosocial Interventions/Education: To be done in Cardiac Rehab.  Encourage attendance for stress management lecture.  Instruct patient on 3 minute breathing space or mindful breathing technique.  Patient approached about seeing a mental healthcare provider.  2/29/2024 30 Day Assessment: Mandie was questioned re: any new or increased symptoms of stress, anxiety, or depressions: stress management strategies were discussed. Mandie was encouraged to take time to be mindful daily. Mandie reports  "she watched movies and plays time to relax. Mandie was encouraged to meet with Ger Pickard the staff psychologist and she will consider.AB      Nutrition Assessment:    Hyperlipidemia: Yes     Lipids:   Lab Results   Component Value Date    CHOL 184 11/26/2023    HDL 48.5 11/26/2023    TRIG 105 11/26/2023       Current Dietary Guidelines: Low fat, Low sodium  Barriers to dietary change: no    Diet Habit Survey: Fat Blocking Screener Given  Pre:   Post:   2/29/2024 30 Day Assessment: Mandie was reissued a Fat Block Screener her initial was not returned.AB    Diabetes Assessment    Lab Results   Component Value Date    HGBA1C 5.5 11/26/2023       History of Diabetes: No    Weight Management  5 ft 3 inches  183.60lbs  2/29/2024 30 Day Assessment:   190.4 lbs    Nutrition Plan    Goal Status: In progress    Nutrition Goals: Lipid Goal: HDL>45, LDL <70, Total <180, Trigs <150, Adapt a Mediterranean focused diet prior to discharge, and Lose 1lb/week while enrolled in program  Achieve a BMI between 20-25%  Lower BMI to <31% by program completion    Nutrition Interventions/Education:   To be done in Cardiac Rehab.  Encourage attending educational lectures  Booklet given \"Heart Attack-Bouncing Back\"  Encourage patient to follow a heart healthy diet  Encouraged to meet with a  out patient dietician  Lipid profile reviewed   Fat Block Screener Given.  2/29/2024 30 Day Assessment: Mandie reports she is scheduled for fasting lipids next week, but no new lipids were reported. Mandie reports she is eating a heat healthy diet, but her weight is up 6 lbs in the last 30 days since enrolling into cardiac rehab. Mandie attended the education on Healthy Eating on (2/19/2024).AB      Exercise Assessment    No  Mode: NA  Frequency: NA  Duration: NA    Exercise Prescription     Exercise Prescription based on: 6 Minute Walk Test    Pre: 1465ft / 2.8 mph / 3.1 mets          Frequency:  3 days/week   Mode: Treadmill, Recumbent " Cycle, Arm Ergometer, and SciFit   Duration: 40 total aerobic minutes   Intensity: RPE 11-14  Target HR:   103-141  MET Level: 3-6  Patient wears supplemental O2: No     Modality Workload METs Duration (minutes)   1 Pre-Exercise   5 minutes   2 SFS 1.5 3 10 :00   3 Treadmill 2.5@0.0% 2.9 10 :00   4 UBE 1 2.3 10 :00   5 Recumbent Bike 4 3.0 10 :00   6 Post-Exercise   5 minutes     Resistance Training:  Will Instruct  Home Exercise Prescription given: To be given prior to discharge from program.    Exercise Plan    Goal Status: In progress    Exercise Goals: Increase total exercise duration to 30-45 minutes, Obtain 150 minutes/week of moderate intensity aerobic exercise, and Establish a home exercise program before discharge  Maintain 150 minutes of aerobic exercise per week with Met levels ranging from 3-6 Mets by the end of the program.  Exercising most days of the week for 30 minutes a session.  Muscle toning 2-3 days per week    Exercise Interventions/Education:   To be done in Cardiac Rehab.  Exercise prescription based on 6 MWT  Exercise changes made based on HR an RPE scale in response to exercise  Increase exercise duration by 1-2 minutes per modality for a total of 40 minutes per session  Target goal for duration 40 minutes per session, increase Met levels by 5-10% every 30 days or as tolerated  Encouraged home exercise on days not at rehab  2/29/2024 30 Day Assessment: Mandie is attending her rehab sessions consistently. On her last session Mandie exercised 40 minutes with Met levels ranging from 2.3-3. Over the next 30 days we will plan to progress her Met levels by 5% on the UBE, TM, and SFS. Mandie was encouraged to start exercising on her own days not at rehab. Mandie attended the Exercise education on (2/12/2024).AB      Other Core Components/Risk Factor Assessment:    Medication adherence  Current Medications:   Medication Documentation Review Audit       Reviewed by Matilde Landis RN  (Registered Nurse) on 02/27/24 at 1341      Medication Order Taking? Sig Documenting Provider Last Dose Status   acetaminophen (Tylenol) 325 mg tablet [id number] Yes Take 2 tablets (650 mg) by mouth every 4 hours if needed. Historical Provider, MD  Active   aspirin 81 mg chewable tablet [id number] Yes Chew 1 tablet (81 mg) once daily. Fabián Garcia PA-C  Active   atorvastatin (Lipitor) 40 mg tablet [id number] Yes Take 1 tablet (40 mg) by mouth once daily at bedtime. Fabián Garcia PA-C  Active   metoprolol succinate XL (Toprol-XL) 50 mg 24 hr tablet [id number] Yes Take 1 tablet (50 mg) by mouth once daily. Do not crush or chew. Bibiana Hudson MD  Active   nitroglycerin (Nitrostat) 0.4 mg SL tablet [id number] Yes Place 1 tablet (0.4 mg) under the tongue every 5 minutes if needed for chest pain for up to 5 doses. Bibiana Hudson MD  Active   triamcinolone (Kenalog) 0.1 % cream [id number] Yes APPLY AS NEEDED TWICE DAILY TO FLARES Historical Provider, MD  Active                                 Medication compliance: Yes   Uses pill box/organizer: Yes    Carries medication list: Yes     Blood Pressure Management  History of Hypertension: No   Medication Changes: No   Resting BP:  123/83       Heart Failure Management  Hx of Heart Failure: No    Smoking/Tobacco Assessment  Social History     Tobacco Use   Smoking Status Never   Smokeless Tobacco Never       Other Core Component Plan    Goal Status: In progress    Other Core Component Goals: Medication compliance and Achieve resting BP of < 130/80 by discharge  Encourage patient to be knowledgeable on medication usage and actions by discharge.  Encourage patient to carry an updated medication list      Other Core Component Interventions/Education:   Carries updated medication list.  Check resting blood pressures pre and post exercise  Following a low sodium/2300 mg sodium diet  Medication list provided  Reviewed effects of exercise on blood pressures  2/29/2024 30 Day  Assessment: Mandie reports she is knowledgeable of her prescribed medications and compliant with her prescribed medications. Mandie's blood pressures remain at the target of <130/80. Mandie reports she is eating a low sodium diet. Mandie attended the education on Heart Failure (2/12/2024).AB    Individual Patient Goals:    To develop a heart healthy exercise routine of 150 minutes per week with Met levels greater than 3 by the end of the program, and be able to return to completing required daily tasks at home without symptoms  To lose 1 lb per week while enrolled in the cardiac rehab program.     Goal Status: In progress    Staff Comments:  Reviewed exercise time, Met levels, and RPE scale of heart healthy exercise.AB  2/29/2024 30 Day Assessment: Mandie reports she has occasionally experienced a fluttering feeling in her heart but not while at rehab and is scheduled for a stress echo next week. Mandie remains free from falls since enrolled in the rehab program.AB    Rehab Staff Signature: Elizabeth Talavera RN

## 2024-02-29 NOTE — PROGRESS NOTES
MEDICAL GENETICS INITIAL VISIT NOTE     Patient full name: Mandie Arevalo  MRN: [id number]  YOB: 1975   Present during this visit: The patient and , Honorio    Primary care provider: Sam Rubio MD  Referring provider: Bibiana Hudson MD  Medical history was obtained from the patient. Prior to this appointment, I reviewed medical records from outpatient medical records and scanned documents, if available.     History of present illness:    Dear Dr. Hudson:    It was a pleasure to see Ms. Arevalo in clinic today. Ms. Arevalo is a 48 y.o. female who was referred to Genetics for evaluation of inherited connective tissue disorders. Ms. 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 Seen and followed by Vascular Medicine and the suspicion for FMD is low. She was referred to Genetics to rule out inherited connective tissue disorders. She was healthy prior to the diagnosis of SCAD. Pertinent negative and positive history related to inherited connective tissue disorders are as followings:     - Hypermobility: No  - Joint laxity: No  - Joint dislocation: No  - Chronic widespread musculoskeletal pain: No  - Tendon/muscle rupture:  No  - Fractures: finger, nose from trauma  - Scoliosis: No  - Pectus differences: No  - Flat feet: No  - Spontaneous skin separation: No  - Easy bruising: No  - Skin hyperextensibility: No  - Stretch marks not related to weight change: No   related to pregnancy  - Abnormal scar formation, atrophic scar, wound dehiscence: No  - Dental crowding: No  - Gingival disease: No  - Pneumothorax: No  - Hernia: No  - Rectal prolapse: No  - Pelvic/uterine prolapse: No  - Spontaneous rupture of internal organs: No  - She has had colonoscopy without complications.   - Obstetric complications (e.g., significant vaginal tear, or postpartum hemorrhage): No  - Lens dislocation:  No  - High myopia:  No  - Hearing loss:   "No    Pediatric history: No congenital hip dislocation, congenital club feet, hypotonia, developmental delay, autism.      Social history:  Works as an . Does not drink nor smoke.      Family history:  Four-generation family tree was obtained and scanned to chart, under \"Genetics\" folder.  Pertinent history   21 yo son with developmental delay, prematurity, and a dx of cerebral palsy. He finished high school. Apgar 10, 10. No significant intrapartum event. .   Mom (d, 72) pancreatic cancer.   MGM's mom (d) aortic stenosis, uncertain if congenital.   Pat aunt (d) breast cancer dx 50s-60s     No other family members with unexplained death, aortopathy, vascular dissection/aneurysm, ectopia lentis, rupture of internal organs, or spontaneous pneumothorax.     Paternal side: [place]  Maternal side: ?  Ashkenazi Scientologist: Denied      Physical examination:  GENERAL: The patient is alert, in no apparent distress. Does not appear to have dolichosternomelia.   Head shape is normal.  Face: No malar hypoplasia. Forehead, nose, philthrum, and chin appear normal.  Eyes: Not deep set. Palpebral fissures normally positioned. Sclerae not blue or icteric. PERRLA. No iridodonesis.  Ears: Not dysplastic, posteriorly rotated, or low set.  Oral cavity: No high arched palate. No dental crowding. Normal uvula, normal dentition, no receding gum line or gingivitis.  CHEST/LUNGS: Pectus deferred. Lungs are clear bilaterally without rhonchi, rales, or wheezes.  HEART: RRR, no R/M/G.  ABDOMEN: No abdominal wall defect.   EXTREMITIES/Back: No arachnodactyly. Wrist signs negative. Thumb signs negative. Bilateral piezogenic papules+. Acrogeria-. No joint contractures. No pretibial plaque. Hindfoot deformity-, flat feet-, scoliosis- by forward bending test. No peripheral edema. No varicose veins.   SKIN: Stretch marks deferred. No skin hyperextensibility, no bruising, skin consistency not doughy or velvety. Not translucent. " Scar not observed.   NEUROLOGIC: EOMI without nystagmus. No ptosis. No facial palsy. Tongue in midline. No dysarthria. Normal gait without abnormal movement.   Psychiatric: Cooperative. Appropriate mood and affect.     Beighton score for generalized joint hypermobility  1. Passive dorsiflexion and hyperextension of the fifth MCP joint beyond 90°: 0  2. Passive apposition of the thumb to the flexor aspect of the forearm: 0  3. Passive hyperextension of the elbow beyond 10°:  0  4. Passive hyperextension of the knee beyond 10°:  0  5. Active forward flexion of the trunk with the knees fully extended so that the palms of the hands rest flat on the floor:  0  Total  0/9 (where a score of equal to or more than 5 is considered positive for age)     Systemic score for Marfan syndrome is 1 (striae) where a score of equal to or more than 7 is considered positive systemic score.     Results:    CTA head/neck 12/7/2023  IMPRESSION:  * CT angiography of the carotid and vertebral circulation in the head and neck is within normal limits.    CT C/A/P 11/25/2023  FINDINGS:  AORTA:      Acute intramural hematoma: Negative  Penetrating ulcer: Negative  Dissection: Negative  Thoracic aortic aneurysm: Negative  Abdominal aortic aneurysm: Negative      MAJOR AORTIC BRANCH VESSELS:      Brachiocephalic arteries: No significant focal stenosis  Celiac and branches: No significant focal stenosis  SMA and first order branches: No significant focal stenosis  Renal arteries: No significant focal stenosis  LEONOR: No significant focal stenosis  Iliac arteries: No significant focal stenosis  Other: n/a      OTHER CARDIOVASCULAR:  Heart size: Within normal limits      Acute pulmonary embolism: Negative through the lobar (second order)  branch level. Segmental and more distal branches not well enough  opacified to evaluate. Pulmonary arteries ectasia: Negative      Heart failure change: Negative.  No sign of interstitial or alveolar  edema.       Other: n/a      OTHER CHEST:      NONVASCULAR MEDIASTINUM:  Esophagus: Grossly normal by CT  Mediastinal Mass: Negative  Hiatal hernia: None      LUNGS / AIRSPACES / AIRWAYS:      LARGE AIRWAYS  Filling defect: Negative  Wall thickening: Negative  Bronchiectasis: Negative  Other: N/A      AIRSPACES  Fibrosis: Negative  Emphysema: Negative  Consolidation: Negative  Ground glass airspace disease: Negative  Edema: Negative  Nodule / Mass: Negative  Other: Minimal dependent atelectasis      PLEURA: No pleural effusion or pneumothorax on either side      LYMPH NODES: No thoracic adenopathy      THORACIC SKELETON: No acute findings      CHEST WALL: I suspect small cysts in both breasts. Most recent  dedicated breast imaging are the screening mammograms from August, 2021 at which time at least some of these cysts were present but  unchanged from their respective priors. More recent breast imaging  should be considered unless this patient has up-to-date breast  imaging at some outside institution      -------      CT ABDOMEN AND PELVIS:      LIVER: Normal. No enlargement or evidence of cirrhosis or fatty  change. No mass or other suspect lesion.      SPLEEN: Normal. No enlargement, mass or evidence of splenic vein  thrombosis.      PANCREAS: Normal. No CT evidence of acute or chronic pancreatitis. No  duct dilation. No mass.      GALLBLADDER: Normal CT appearance. No dilation, calcified, or  gas-containing stones.  Other types of gallstones could be occult on  CT and detectable only by ultrasound.      BILE DUCTS: Normal. No biliary duct dilation.      ADRENAL GLANDS: Small right lipid rich adenoma does not need  follow-up. Left side normal. No acute findings or findings needing  follow-up on either side      KIDNEYS AND URETERS: Normal.  No hydronephrosis on either side.  No  mass.  Symmetric enhancement.  No infarct or CT evidence of acute  pyelonephritis.  No substantial radiodense stone.  Tiny stones and  radiolucent  stones could be occult on CT.      LYMPH NODES: No adenopathy, intraperitoneal, retroperitoneal, pelvic  or otherwise      APPENDIX: Normal.  Not dilated, thick walled or in any other way  inflamed in appearance.  No inflammatory change about the appendix.      COLON: Normal. No sign of acute diverticulitis or other colitis. No  annular constricting mass.      SMALL BOWEL: Normal. No small bowel dilation or any other sign of  small bowel obstruction. No sign of active inflammatory bowel disease.      STOMACH / DUODENUM: Grossly normal by CT which has limited  sensitivity and specificity for the stomach and duodenum.      RETROPERITONEUM: Normal.  No acute hemorrhage or inflammatory change.  Lymph nodes in a separate dedicated section.      OMENTUM, MESENTERY AND PERITONEAL SPACES:  Free intraperitoneal air: Negative  Free intraperitoneal fluid: Negative  Abscess: Negative  Other: n/a      URINARY BLADDER: Normal. No wall thickening, large diverticula,  radiodense stone or surrounding inflammatory change.      PELVIS: The uterus is present. Probability of a lower uterine body or  even cervical fibroid anteriorly. One substantial sized cyst in each  adnexa, each about 3 cm diameter. On the left, there is a delineated  attenuation difference with more simple appearing fluid layering on  top of what is probably hemorrhagic fluid      ABDOMINAL WALL:  Hernia: Negative  Other: No acute or contributory abnormality.      ABDOMINAL / PELVIC SKELETON: Grade 1 anterolisthesis of L5 on S1 with  associated chronic bilateral L5 pars defects. No acute findings      IMPRESSION:  NO ACUTE AORTIC HEMATOMA, PENETRATING ULCER, ANEURYSM, AORTIC  DISSECTION OR ANY OTHER ACUTE FINDINGS IN THE CHEST, ABDOMEN OR PELVIS      CTA OF THE BRACHIOCEPHALIC VESSELS LIKEWISE UNREMARKABLE, WITHOUT  DISSECTION OR HIGH-GRADE STENOSIS      NO ACUTE PULMONARY EMBOLISM THROUGH THE SEGMENTAL BRANCH LEVEL (THIS  EXAM HAS ESSENTIALLY EQUIVALENT  SENSITIVITY/SPECIFICITY FOR ACUTE  PULMONARY EMBOLISM EVALUATION AS A DEDICATED CT CHEST FOR PULMONARY  EMBOLISM EVALUATION AND IS NEGATIVE)      NO ACUTE AIRSPACE DISEASE SUCH AS PNEUMONIA      NO PLEURAL OR PERICARDIAL EFFUSION      NO PNEUMOTHORAX      NO ACUTE FINDINGS IN THE ABDOMEN OR PELVIS; HOWEVER, NONEMERGENT BUT  NEAR TERM PELVIC ULTRASOUND RECOMMENDED TO EVALUATE THE ADNEXA. ONE  PROMINENT CYST ON EACH SIDE, WITH PROBABILITY OF LAYERING HEMORRHAGIC  DEBRIS IN THE LEFT OVARIAN/ADNEXAL CYST      MACRO:  None    Assessment:    ###SCAD  Ms. Arevalo is a 48 y.o. female with phenotypes as outlined in the HPI and PE sections. Personal history is notable for spontaneous coronary artery dissection (SCAD). CTA of head, neck, chest, abdomen, and pelvis did not show additional vasculopathy or FMD lesions.     It is reasonable that we rule out hereditary connective tissue disorders that may present with SCAD especially Loeys-Margaret syndrome and vEDS (PMID: 79915264). About 20% of cases with SCAD are idiopathic (PMID 82643061). Ms. Arevalo's family history and physical exam do not strongly suggest inherited connective tissue disorders.     Benefits of having genetic test include 1) to establish a molecular diagnosis; 2) to provide further medical recommendations specific to each diagnosis; 3) for familial cascade testing, recurrence risk estimation, and family planning; and 4) to allow for participation in support group organizations and enrolment in clinical trials, if any.  Pretest genetic counseling was provided, including the nature of the test; three types of test result (positive, negative, and uncertain); the fact that a negative result does not exclude a possibility of genetic disorders; and retention of de-identified genetic data at the testing company. The patient provided a verbal informed consent.     Plan:  - [facility] Connective Tissue Disorders (92 genes). TAT 4 weeks. Sample: blood obtained today.  Insurance billing. Ms. Arevalo is aware of a potential self-pay option of $250.    ###Maternal history of pancreatic cancer  Her mother passed away due to pancreatic cancer. Paternal aunt had late-onset breast cancer.     Pancreatic cancer is common with a lifetime risk of 1 in 64 in general population. We discussed genetics of pancreatic cancer. Most of the cases are multifactorial (polygenic and environmental) in origin, while 10%-15% are caused by single gene mutations. Identification of individuals harboring the genetic variant is beneficial since we could provide further medical recommendations as well as obtain familial cascade testing. For example, individuals with pathogenic variants in BRCA1 or BRCA2 have significantly increased risk of breast cancer to 60%-70% by 70 years of age, in addition to risk of other cancers such as ovarian cancer. There is also a role of prophylactic surgery, PARP inhibitor, as well as enrolment in various clinical trials. Asymptomatic individuals with pancreatic cancer gene mutations (e.g., BRCA1/2, PALB2, WALTER) may also need pancreatic cancer screening based on their family history.     Ms. Arevalo meets genetic testing criteria given maternal history of pancreatic cancer. Testing is necessary and clinically indicated. I recommended a gene panel of common hereditary cancer genes.     I discussed that the most appropriate person to have genetic testing is the one who is affected with cancer. If the pathogenic variant is identified, we could test other first-degree relatives. The reason that we do not test an asymptomatic individual is that the negative result is uninformative. We would not know if it is negative because Ms. Arevalo does not inherit the familial variant, if any (true negative) or if the test fails to detect the familial variant (we do not know what to be looking for; false negative). Thus, a negative test does not provide a 100% reassurance that Ms. Arevalo has  not inherited the familial variant, if any.     We discussed Genetic Information Non-discrimination Act ([place]), which is a federal law that inhibits employer and health insurance to make decision based on genetic information. There is also another layer of protection from state law. It however does not apply to life insurance, nursing home insurance, and disability insurance. Having a genetic variant that predisposes to cancers in an asymptomatic individual may have insurability implications.     Ms. Arevalo is interested in pursuing the recommended test. Pretest genetic counseling was provided as outlined above.     Plan:  - [facility] Common Hereditary Cancer panel (48 genes). TAT 4 weeks. I will investigate the insurance coverage and contact Ms. Arevalo. After I discussed with [employer], these two panels are billed separately.     ###Son with cerebral palsy and developmental delay  I briefly discussed that the diagnostic yield of genetic testing in individuals with cerebral palsy is around 30%-40%. There was no inciting event during the peripartum period and his Apgar scores were 10, 10, raising suspicion that there could be an undiagnosed etiology causing these issues. If he is interested, he could consider having a consultation with Genetics.     Return to clinic when results are available.    Thank you for allowing me to participate in the care of this patient. Please do not hesitate to contact me if you have any questions.   Sincerely,     Kevan Austin MD    Medical Geneticist  [facility]  Phone: [phone]  Fax: [phone]   Address: [address] [place] [address]  Time spent (this information is required by insurance): face-to-face 40min (2.30pm-3.10pm); preparation 5min; documentation 30min; placing order(s) for genetic testing 5min; total time spent 80min

## 2024-02-29 NOTE — LETTER
02/29/24    Sam Rubio MD  [address] [facility] [address]      Dear Dr. Sam Rubio MD,    I am writing to confirm that your patient, Mandie Arevalo  received care in my office on 02/29/24. I have enclosed a summary of the care provided to Mandie for your reference.    Please contact me with any questions you may have regarding the visit.    Sincerely,         Kevan Austin MD  [address]  [facility] [address]    CC: No Recipients

## 2024-03-01 ENCOUNTER — CLINICAL SUPPORT (OUTPATIENT)
Dept: CARDIAC REHAB | Facility: HOSPITAL | Age: 49
End: 2024-03-01
Payer: COMMERCIAL

## 2024-03-01 DIAGNOSIS — I21.4 NON-ST ELEVATION MYOCARDIAL INFARCTION (NSTEMI) (MULTI): ICD-10-CM

## 2024-03-01 PROCEDURE — 93798 PHYS/QHP OP CAR RHAB W/ECG: CPT | Performed by: INTERNAL MEDICINE

## 2024-03-04 ENCOUNTER — CLINICAL SUPPORT (OUTPATIENT)
Dept: CARDIAC REHAB | Facility: HOSPITAL | Age: 49
End: 2024-03-04
Payer: COMMERCIAL

## 2024-03-04 ENCOUNTER — TELEPHONE (OUTPATIENT)
Dept: GENETICS | Facility: CLINIC | Age: 49
End: 2024-03-04

## 2024-03-04 DIAGNOSIS — I21.4 NON-ST ELEVATION MYOCARDIAL INFARCTION (NSTEMI) (MULTI): ICD-10-CM

## 2024-03-04 LAB
ATRIAL RATE: 77 BPM
P AXIS: 57 DEGREES
P OFFSET: 196 MS
P ONSET: 137 MS
PR INTERVAL: 172 MS
Q ONSET: 223 MS
QRS COUNT: 12 BEATS
QRS DURATION: 80 MS
QT INTERVAL: 360 MS
QTC CALCULATION(BAZETT): 407 MS
QTC FREDERICIA: 391 MS
R AXIS: 88 DEGREES
T AXIS: 47 DEGREES
T OFFSET: 403 MS
VENTRICULAR RATE: 77 BPM

## 2024-03-04 PROCEDURE — 93798 PHYS/QHP OP CAR RHAB W/ECG: CPT | Performed by: INTERNAL MEDICINE

## 2024-03-04 NOTE — TELEPHONE ENCOUNTER
I spoke with the patient this afternoon regarding billing for connective tissue disorder and cancer genetic testing. The follow was shared with the patient:   Dr. Austin  confirmed with [facility] each test would have an out of pocket cost from $0 to $100. Therefore your total out of pocket cost would range from $0-200. A second sample will also be needed from the patient as the panels are considered separate. The patient would like to proceed with both tests and plans to complete their blood draw at [facility].

## 2024-03-05 ENCOUNTER — CLINICAL SUPPORT (OUTPATIENT)
Dept: SLEEP MEDICINE | Facility: CLINIC | Age: 49
End: 2024-03-05
Payer: COMMERCIAL

## 2024-03-05 ENCOUNTER — HOSPITAL ENCOUNTER (OUTPATIENT)
Dept: CARDIOLOGY | Facility: HOSPITAL | Age: 49
Discharge: HOME | End: 2024-03-05
Payer: COMMERCIAL

## 2024-03-05 DIAGNOSIS — I25.42 CORONARY ARTERY DISSECTION: ICD-10-CM

## 2024-03-05 DIAGNOSIS — I25.42 SPONTANEOUS DISSECTION OF CORONARY ARTERY: ICD-10-CM

## 2024-03-05 DIAGNOSIS — I51.3 LV (LEFT VENTRICULAR) MURAL THROMBUS: ICD-10-CM

## 2024-03-05 DIAGNOSIS — I25.42 DISSECTION OF CORONARY ARTERY: ICD-10-CM

## 2024-03-05 DIAGNOSIS — R07.9 ACUTE CHEST PAIN: ICD-10-CM

## 2024-03-05 DIAGNOSIS — R07.9 CHEST PAIN, UNSPECIFIED TYPE: ICD-10-CM

## 2024-03-05 DIAGNOSIS — R06.83 SNORING: ICD-10-CM

## 2024-03-05 DIAGNOSIS — G47.33 OBSTRUCTIVE SLEEP APNEA (ADULT) (PEDIATRIC): ICD-10-CM

## 2024-03-05 DIAGNOSIS — G47.30 SLEEP APNEA, UNSPECIFIED: ICD-10-CM

## 2024-03-05 DIAGNOSIS — R53.83 OTHER FATIGUE: ICD-10-CM

## 2024-03-05 DIAGNOSIS — I51.3 LV (LEFT VENTRICULAR) MURAL THROMBUS: Primary | ICD-10-CM

## 2024-03-05 PROCEDURE — 93306 TTE W/DOPPLER COMPLETE: CPT

## 2024-03-05 PROCEDURE — 95806 SLEEP STUDY UNATT&RESP EFFT: CPT | Performed by: INTERNAL MEDICINE

## 2024-03-05 PROCEDURE — 2500000004 HC RX 250 GENERAL PHARMACY W/ HCPCS (ALT 636 FOR OP/ED): Performed by: INTERNAL MEDICINE

## 2024-03-05 PROCEDURE — 93306 TTE W/DOPPLER COMPLETE: CPT | Performed by: INTERNAL MEDICINE

## 2024-03-05 RX ADMIN — PERFLUTREN 8 ML OF DILUTION: 6.52 INJECTION, SUSPENSION INTRAVENOUS at 09:49

## 2024-03-05 NOTE — PROGRESS NOTES
Type of Study: HOME SLEEP STUDY - NOMAD     The patient received equipment and instructions for use of the [employer] Nomad HSAT device. The patient was instructed how to apply the effort belts, cannula, thermistor. It was also explained how the Nomad and oximeter components work.  The patient was asked to record their sleep for an 8-hour period.     The patient was informed of their responsibility for the device and acknowledged this by signing the HSAT device contract. The patient was asked to return the device on 03/06/2024 by  10a.m.  to the  Respiratory Care Department at [facility].     The patient was instructed to call 911 as usual for any medical- emergencies while at home.  The patient was also given a phone number for troubleshooting when using the device in case there were additional questions.

## 2024-03-06 ENCOUNTER — TELEPHONE (OUTPATIENT)
Dept: CARDIOLOGY | Facility: CLINIC | Age: 49
End: 2024-03-06

## 2024-03-06 ENCOUNTER — CLINICAL SUPPORT (OUTPATIENT)
Dept: CARDIAC REHAB | Facility: HOSPITAL | Age: 49
End: 2024-03-06
Payer: COMMERCIAL

## 2024-03-06 DIAGNOSIS — I51.3 LV (LEFT VENTRICULAR) MURAL THROMBUS: ICD-10-CM

## 2024-03-06 DIAGNOSIS — I21.4 NON-ST ELEVATION MYOCARDIAL INFARCTION (NSTEMI) (MULTI): ICD-10-CM

## 2024-03-06 DIAGNOSIS — I25.42 SPONTANEOUS DISSECTION OF CORONARY ARTERY: ICD-10-CM

## 2024-03-06 DIAGNOSIS — I25.3: ICD-10-CM

## 2024-03-06 DIAGNOSIS — I20.89 STABLE ANGINA PECTORIS (CMS-HCC): Primary | ICD-10-CM

## 2024-03-06 DIAGNOSIS — I42.2: ICD-10-CM

## 2024-03-06 LAB
AORTIC VALVE MEAN GRADIENT: 4 MMHG
AORTIC VALVE PEAK VELOCITY: 1.42 M/S
AV PEAK GRADIENT: 8.1 MMHG
EJECTION FRACTION APICAL 4 CHAMBER: 49.9
EJECTION FRACTION: 51 %
LEFT ATRIUM VOLUME AREA LENGTH INDEX BSA: 14.8 ML/M2
LEFT VENTRICLE INTERNAL DIMENSION DIASTOLE: 4.85 CM (ref 3.5–6)
MITRAL VALVE E/A RATIO: 0.85
MITRAL VALVE E/E' RATIO: 5.64
RIGHT VENTRICLE FREE WALL PEAK S': 9.9 CM/S
TRICUSPID ANNULAR PLANE SYSTOLIC EXCURSION: 1.9 CM

## 2024-03-06 PROCEDURE — 93798 PHYS/QHP OP CAR RHAB W/ECG: CPT | Performed by: INTERNAL MEDICINE

## 2024-03-06 RX ORDER — REGADENOSON 0.08 MG/ML
0.4 INJECTION, SOLUTION INTRAVENOUS
Status: CANCELLED | OUTPATIENT
Start: 2024-03-06

## 2024-03-06 NOTE — PROGRESS NOTES
Spoke with patient regarding abnormal echo with possible thrombus and apical aneurysm. We will obtain CMR with stress test to evaluate for ischemia and also r/o any thrombus/aneurysm. I called the patient and discussed. Patient to take eliquis in the meantime until CMR is done.

## 2024-03-06 NOTE — TELEPHONE ENCOUNTER
3/6/24  1703  Called results to patient; patient was already notified by Dr. Hermosillo and has been started on Eliquis.      Patient verbalized understanding; reported she is continuing with cardiac rehab per Dr. Hermosillo's recommendation and already has her cardiac MRI appt.    Reinforced recommendation to continue with cardiac rehab; but to inform staff if she becomes symptomatic.    Patient verbalized understanding.          ----- Message from Gene Hermosillo MD sent at 3/6/2024  8:52 AM EST -----  See me in office in 2-3 weeks    ----- Message -----  From: Rocio, Syngo - Cardiology Results In  Sent: 3/6/2024   8:08 AM EST  To: Gene Hermosillo MD

## 2024-03-08 ENCOUNTER — CLINICAL SUPPORT (OUTPATIENT)
Dept: CARDIAC REHAB | Facility: HOSPITAL | Age: 49
End: 2024-03-08
Payer: COMMERCIAL

## 2024-03-08 DIAGNOSIS — I21.4 NON-ST ELEVATION MYOCARDIAL INFARCTION (NSTEMI) (MULTI): ICD-10-CM

## 2024-03-08 PROCEDURE — 93798 PHYS/QHP OP CAR RHAB W/ECG: CPT | Performed by: INTERNAL MEDICINE

## 2024-03-11 ENCOUNTER — CLINICAL SUPPORT (OUTPATIENT)
Dept: CARDIAC REHAB | Facility: HOSPITAL | Age: 49
End: 2024-03-11
Payer: COMMERCIAL

## 2024-03-11 DIAGNOSIS — I21.4 NON-ST ELEVATION MYOCARDIAL INFARCTION (NSTEMI) (MULTI): ICD-10-CM

## 2024-03-11 PROCEDURE — 93798 PHYS/QHP OP CAR RHAB W/ECG: CPT | Performed by: INTERNAL MEDICINE

## 2024-03-13 ENCOUNTER — APPOINTMENT (OUTPATIENT)
Dept: CARDIAC REHAB | Facility: HOSPITAL | Age: 49
End: 2024-03-13
Payer: COMMERCIAL

## 2024-03-14 PROBLEM — I51.89 MILD LEFT VENTRICULAR SYSTOLIC DYSFUNCTION: Status: ACTIVE | Noted: 2024-03-14

## 2024-03-14 PROBLEM — I51.3 LEFT VENTRICULAR APICAL THROMBUS: Status: ACTIVE | Noted: 2024-03-14

## 2024-03-14 PROBLEM — D68.69 OTHER THROMBOPHILIA (MULTI): Status: ACTIVE | Noted: 2024-03-14

## 2024-03-14 RX ORDER — FLUOXETINE HYDROCHLORIDE 20 MG/1
1 CAPSULE ORAL EVERY 24 HOURS
COMMUNITY
Start: 2024-03-07

## 2024-03-14 RX ORDER — CLOPIDOGREL BISULFATE 75 MG/1
75 TABLET ORAL
COMMUNITY
Start: 2023-11-28 | End: 2024-03-19 | Stop reason: WASHOUT

## 2024-03-15 ENCOUNTER — CLINICAL SUPPORT (OUTPATIENT)
Dept: CARDIAC REHAB | Facility: HOSPITAL | Age: 49
End: 2024-03-15
Payer: COMMERCIAL

## 2024-03-15 DIAGNOSIS — I21.4 NON-ST ELEVATION MYOCARDIAL INFARCTION (NSTEMI) (MULTI): ICD-10-CM

## 2024-03-15 PROCEDURE — 93798 PHYS/QHP OP CAR RHAB W/ECG: CPT | Performed by: INTERNAL MEDICINE

## 2024-03-18 ENCOUNTER — CLINICAL SUPPORT (OUTPATIENT)
Dept: CARDIAC REHAB | Facility: HOSPITAL | Age: 49
End: 2024-03-18
Payer: COMMERCIAL

## 2024-03-18 DIAGNOSIS — I21.4 NON-ST ELEVATION MYOCARDIAL INFARCTION (NSTEMI) (MULTI): ICD-10-CM

## 2024-03-18 PROCEDURE — 93798 PHYS/QHP OP CAR RHAB W/ECG: CPT | Performed by: INTERNAL MEDICINE

## 2024-03-19 ENCOUNTER — APPOINTMENT (OUTPATIENT)
Dept: CARDIOLOGY | Facility: CLINIC | Age: 49
End: 2024-03-19
Payer: COMMERCIAL

## 2024-03-19 ENCOUNTER — OFFICE VISIT (OUTPATIENT)
Dept: CARDIOLOGY | Facility: CLINIC | Age: 49
End: 2024-03-19
Payer: COMMERCIAL

## 2024-03-19 VITALS
BODY MASS INDEX: 33.66 KG/M2 | DIASTOLIC BLOOD PRESSURE: 94 MMHG | HEIGHT: 63 IN | SYSTOLIC BLOOD PRESSURE: 130 MMHG | HEART RATE: 63 BPM | WEIGHT: 190 LBS | OXYGEN SATURATION: 99 %

## 2024-03-19 DIAGNOSIS — I51.3 LV (LEFT VENTRICULAR) MURAL THROMBUS: Primary | ICD-10-CM

## 2024-03-19 DIAGNOSIS — I25.42 SPONTANEOUS DISSECTION OF CORONARY ARTERY: ICD-10-CM

## 2024-03-19 DIAGNOSIS — E78.2 MIXED HYPERLIPIDEMIA: ICD-10-CM

## 2024-03-19 LAB
LAB MOLECULAR CA TECHNICAL NOTES: NORMAL
SCAN RESULT: NORMAL

## 2024-03-19 PROCEDURE — 1036F TOBACCO NON-USER: CPT | Performed by: STUDENT IN AN ORGANIZED HEALTH CARE EDUCATION/TRAINING PROGRAM

## 2024-03-19 PROCEDURE — 99215 OFFICE O/P EST HI 40 MIN: CPT | Performed by: STUDENT IN AN ORGANIZED HEALTH CARE EDUCATION/TRAINING PROGRAM

## 2024-03-19 RX ORDER — METOPROLOL SUCCINATE 50 MG/1
75 TABLET, EXTENDED RELEASE ORAL DAILY
Qty: 135 TABLET | Refills: 3 | Status: SHIPPED | OUTPATIENT
Start: 2024-03-19 | End: 2025-03-19

## 2024-03-19 NOTE — PROGRESS NOTES
[facility] V[facility] Cardiology Clinic Note    Date: 03/19/24  Time: 3:58 PM    Subjective    aMndie Arevalo is a 48 y.o.  female female who is coming to clinic for follow-up visit.     Briefly patient was previously admitted to hospital for NSTEMI. LHC showed diffuse distal LAD diz suspicious for SCAD. LV gram showed Taukosubo pattern. She was discharged and recommended f/up with vascular medicine. She returned few days later with worsening pain and recurrent NSTEMI with rise in trop. LHC showed progression of SCAD and occluded apical LAD. She was stabilized in hospital and medical management. She then saw vascular medicine for the same. No FMD was diagnosed on workup. She completed DAPT for ~ 6 weeks now remains on ASA. She remain on statin due to HLD.      Patient had a started cardiac rehab and had reported some chest discomfort.  Patient was then recommended to undergo stress echo.  There was some concern for LV thrombus on stress echo and stress portion was not performed.  Patient was then subsequently recommended to undergo cardiac MRI with stress testing.  Patient is scheduled for that next month.  Patient is started on anticoagulation with Eliquis in the meantime.  Patient reports that she is not having any more chest discomfort episode.  She continues cardiac rehab at this time.    Patient's blood pressure is stable at this time.  She was also started on SSRI by primary care.        She follow with vascular medicine.      She was given as needed sublingual nitroglycerin but used it once since last visit.       Review of Systems:  Otherwise, limited cardiovascular review of systems is negative.        Medical History:   She has a past medical history of Fatigue, NSTEMI (non-ST elevated myocardial infarction) (CMS/HCC), Shortness of breath on exertion, and Spontaneous dissection of coronary artery.  Surgical History:   Past Surgical History:   Procedure Laterality Date    CARDIAC  CATHETERIZATION N/A 2023    Procedure: Left Heart Cath, With LV;  Surgeon: Gene Hermosillo MD;  Location: [facility];  Service: Cardiovascular;  Laterality: N/A;    CARDIAC CATHETERIZATION N/A 2023    Procedure: Left Heart Cath, With LV;  Surgeon: Gene Hermosillo MD;  Location: [facility];  Service: Cardiovascular;  Laterality: N/A;     SECTION, CLASSIC      x2    CORONARY ANGIOPLASTY  2023   PSHP@  Social History:   Social Determinants of Health with Concerns     Financial Resource Strain: Low Risk  (2023)    Overall Financial Resource Strain (CARDIA)     Difficulty of Paying Living Expenses: Not very hard   Recent Concern: Financial Resource Strain - Medium Risk (2023)    Overall Financial Resource Strain (CARDIA)     Difficulty of Paying Living Expenses: Somewhat hard   Food Insecurity: Not on file   Physical Activity: Not on file   Stress: Not on file   Social Connections: Not on file   Intimate Partner Violence: Not on file   Utilities: Not on file   Digital Equity: Not on file     Family History:   Family History   Problem Relation Name Age of Onset    Cancer Mother Sima nadia     Atrial fibrillation Father Colton     Hypertension Father Colton     Other (aortic disorder) Maternal Grandmother        Allergies:  Patient has no known allergies.    Outpatient Medications:  Current Outpatient Medications   Medication Instructions    acetaminophen (TYLENOL) 650 mg, oral, Every 4 hours PRN    apixaban (Eliquis) 5 mg (74 tabs) tablet Take 1 tablet (5 mg) by mouth 2 times a day    aspirin 81 mg, oral, Daily    atorvastatin (LIPITOR) 40 mg, oral, Nightly    FLUoxetine (PROzac) 20 mg capsule 1 capsule, Every 24 hours    metoprolol succinate XL (TOPROL-XL) 75 mg, oral, Daily, Do not crush or chew.    nitroglycerin (NITROSTAT) 0.4 mg, sublingual, Every 5 min PRN    triamcinolone (Kenalog) 0.1 % cream APPLY AS NEEDED TWICE DAILY TO FLARES       Objective     Physical  "Exam  Vitals:    03/19/24 1447   BP: (!) 130/94   BP Location: Left arm   Patient Position: Sitting   BP Cuff Size: Adult   Pulse: 63   SpO2: 99%   Weight: 86.2 kg (190 lb)   Height: 1.6 m (5' 3\")     Wt Readings from Last 3 Encounters:   03/19/24 86.2 kg (190 lb)   02/29/24 87.1 kg (192 lb)   02/27/24 86.6 kg (191 lb)       General: Alert and Oriented, No distress, cooperative  Head: Normocephalic without obvious abnormality, atraumatic  Eyes: Conjunctiva/corneas clear, EOM's grossly intact  Neck: Supple, trachea midline, No thyroid enlargement/tenderness/nodules; No JVD  Lungs: Clear to auscultation bilaterally, no wheezes, rhonci, or rales. respirations unlabored  Chest Wall: No tenderness or deformity  Heart: Regular rhythm, normal S1/S2, no murmur  Abdomen: Soft, non-tender, Non-distended, bowel sounds active  Extremities: No edema, no cyanosis, no clubbing  Skin: Skin color, texture, turgor normal.  No rashes or lesions noted  Neurologic: Alert and oriented x 3, grossly moving all extremities, speech intact         I have personally reviewed the following images and laboratory findings:  ECG:   Echocardiogram:   Study Type:    TRANSTHORACIC ECHO (TTE) COMPLETE  Diagnosis/ICD: Coronary artery dissection-I25.42; Chest pain, unspecified-R07.9  Indication:    SCAD, Chest Pain  CPT Codes:     Echo Complete w Full Doppler-[id number]   Study Detail: The following Echo studies were performed: 2D, M-Mode, Doppler and                color flow. Definity used as a contrast agent for endocardial                border definition. Total contrast used for this procedure was 2 mL                via IV push.        PHYSICIAN INTERPRETATION:  Left Ventricle: Left ventricular systolic function is mildly decreased. There are no regional wall motion abnormalities. The left ventricular cavity size is normal. The left ventricular septal wall thickness is normal. There is normal left ventricular posterior wall thickness. Spectral Doppler " shows an impaired relaxation pattern of left ventricular diastolic filling. There is a small left ventricular thrombus noted in the apex. Possible apical thrombus - recommend MRI.  LV Wall Scoring:  The entire apex is akinetic. All remaining scored segments are normal.     Left Atrium: The left atrium is normal in size.  Right Ventricle: The right ventricle is normal in size. There is normal right ventricular global systolic function.  Right Atrium: The right atrium is normal in size.  Aortic Valve: The aortic valve appears structurally normal. There is no evidence of aortic valve regurgitation. The peak instantaneous gradient of the aortic valve is 8.1 mmHg. The mean gradient of the aortic valve is 4.0 mmHg.  Mitral Valve: The mitral valve is normal in structure. There is trace mitral valve regurgitation.  Tricuspid Valve: The tricuspid valve is structurally normal. There is trace tricuspid regurgitation.  Pulmonic Valve: The pulmonic valve is structurally normal. There is trace pulmonic valve regurgitation.  Pericardium: There is no pericardial effusion noted.  Aorta: The aortic root is normal.        CONCLUSIONS:   1. Left ventricular systolic function is mildly decreased.   2. Entire apex is abnormal.   3. Spectral Doppler shows an impaired relaxation pattern of left ventricular diastolic filling.   4. There is a small left ventricular thrombus.   5. Possible apical thrombus - recommend MRI.        I have personally reviewed the following images and laboratory findings:  ECG:  Not done today. Prior EKG shows sinus heidy, TWI in anterolateral leads, possible ischemia/LV aneurysm. Minimal ST elevation in inferior leads   Echocardiogram: abnormal and reviewed by myself  11/29/2023  PHYSICIAN INTERPRETATION:  Left Ventricle: Left ventricular systolic function is low normal, with an estimated ejection fraction of 50-55%. Wall motion is abnormal. The left ventricular cavity size is normal. Left ventricular diastolic  filling was not assessed.  LV Wall Scoring:  The apical septal segment, apical anterior segment, and apex are akinetic. The  apical inferior segment is hypokinetic. All remaining scored segments are  normal.     Left Atrium: The left atrium was not assessed.  Right Ventricle: The right ventricle was not assessed. Right ventricular systolic function not assessed.  Right Atrium: The right atrium was not assessed.  Aortic Valve: The aortic valve was not assessed. Aortic valve regurgitation was not assessed.  Mitral Valve: The mitral valve was not assessed. Mitral valve regurgitation was not assessed.  Tricuspid Valve: The tricuspid valve was not assessed. Tricuspid regurgitation was not assessed.  Pulmonic Valve: The pulmonic valve was not assessed. The pulmonic valve regurgitation was not assessed.  Pericardium: Pericardial effusion was not assessed.  Aorta: The aortic root was not assessed.        CONCLUSIONS:   1. Left ventricular systolic function is low normal with a 50-55% estimated ejection fraction.   2. Apical septal segment, apical anterior segment, apex, and apical inferior segment are abnormal.              [facility] 11/30/23  Recommendations:  Maximize medical therapy.  Agressive risk factor modification efforts.  Follow-up with cardiology clinic.  Outpatient evaluation with vascular medicine.  Consider referral to cardiac rehabilitation.  Aspirin therapy.  Beta blocker therapy.     ____________________________________________________________________________________  CONCLUSIONS:   1. Spontaneous coronary artery dissection in distal LAD.   2. No significant coronary artery disease elsewhere as specified above.  Laboratory values:   Hospital Outpatient Visit on 03/05/2024   Component Date Value    LV biplane EF 03/05/2024 51     MV E/A ratio 03/05/2024 0.85     MV avg E/e' ratio 03/05/2024 5.64     Tricuspid annular plane * 03/05/2024 1.9     AV mn grad 03/05/2024 4.0     LA vol index A/L 03/05/2024 14.8     AV  pk jarred 03/05/2024 1.42     RV free wall pk S' 03/05/2024 9.90     LVIDd 03/05/2024 4.85     AV pk grad 03/05/2024 8.1     LV A4C EF 03/05/2024 49.9    Lab on 02/29/2024   Component Date Value    Thyroid Stimulating Horm* 02/29/2024 2.18     WBC 02/29/2024 6.8     nRBC 02/29/2024 0.0     RBC 02/29/2024 4.22     Hemoglobin 02/29/2024 12.5     Hematocrit 02/29/2024 38.6     MCV 02/29/2024 92     MCH 02/29/2024 29.6     MCHC 02/29/2024 32.4     RDW 02/29/2024 13.0     Platelets 02/29/2024 276     Glucose 02/29/2024 91     Sodium 02/29/2024 140     Potassium 02/29/2024 4.2     Chloride 02/29/2024 105     Bicarbonate 02/29/2024 26     Anion Gap 02/29/2024 13     Urea Nitrogen 02/29/2024 11     Creatinine 02/29/2024 0.78     eGFR 02/29/2024 >90     Calcium 02/29/2024 9.5     Scan Result 02/29/2024 See Scanned Result     Technical Notes 02/29/2024                      Value:Results scanned into chart   Office Visit on 02/27/2024   Component Date Value    Ventricular Rate 02/27/2024 77     Atrial Rate 02/27/2024 77     AL Interval 02/27/2024 172     QRS Duration 02/27/2024 80     QT Interval 02/27/2024 360     QTC Calculation(Bazett) 02/27/2024 407     P [place] 02/27/2024 57     R Axis 02/27/2024 88     T Axis 02/27/2024 47     QRS Count 02/27/2024 12     Q Onset 02/27/2024 223     P Onset 02/27/2024 137     P Offset 02/27/2024 196     T Offset 02/27/2024 403     QTC [place] 02/27/2024 391      CBC -  Lab Results   Component Value Date    WBC 6.8 02/29/2024    HGB 12.5 02/29/2024    HCT 38.6 02/29/2024    MCV 92 02/29/2024     02/29/2024       CMP -  Lab Results   Component Value Date    CALCIUM 9.5 02/29/2024    PHOS 3.0 12/01/2023    PROT 7.1 11/29/2023    ALBUMIN 3.7 12/01/2023    AST 13 11/29/2023    ALT 10 11/29/2023    ALKPHOS 62 11/29/2023    BILITOT 0.3 11/29/2023       LIPID PANEL -   Lab Results   Component Value Date    CHOL 184 11/26/2023    HDL 48.5 11/26/2023    CHHDL 3.8 11/26/2023    VLDL 21  11/26/2023    TRIG 105 11/26/2023    NHDL 136 11/26/2023       RENAL FUNCTION PANEL -   Lab Results   Component Value Date    K 4.2 02/29/2024    PHOS 3.0 12/01/2023       Lab Results   Component Value Date    BNP 93 11/29/2023    HGBA1C 5.5 11/26/2023        Assessment/Plan   -Spontaneous coronary dissection  -HLD  -Possible LV thrombus  -Borderline LV dysfunction      Plan:  -I will continue ASA 81 mg daily.  I will continue Eliquis 5 mg twice daily for possible LV thrombus.  -Patient is planned for cardiac MRI with stress testing.  Based on results of the stress testing we will decide further management strategy.  If there is no large ischemia then we will continue conservative management.  -If MRI shows no LV thrombus then we will discontinue Eliquis.  -We will increase metoprolol XL to 75 mg daily.    -We will continue statin therapy as previously taking  -Continue follow-up with vascular medicine team.  -Continue cardiac rehab as previously doing.            In addition, the following orders were placed today:  No orders of the defined types were placed in this encounter.                SIGNATURE: Gene Hermosillo MD PATIENT NAME: Mandie Arevalo   DATE/TIME: March 19, 2024 3:58 PM MRN: [id number]

## 2024-03-20 ENCOUNTER — CLINICAL SUPPORT (OUTPATIENT)
Dept: CARDIAC REHAB | Facility: HOSPITAL | Age: 49
End: 2024-03-20
Payer: COMMERCIAL

## 2024-03-20 DIAGNOSIS — I21.4 NON-ST ELEVATION MYOCARDIAL INFARCTION (NSTEMI) (MULTI): ICD-10-CM

## 2024-03-20 PROCEDURE — 93798 PHYS/QHP OP CAR RHAB W/ECG: CPT | Performed by: INTERNAL MEDICINE

## 2024-03-22 ENCOUNTER — CLINICAL SUPPORT (OUTPATIENT)
Dept: CARDIAC REHAB | Facility: HOSPITAL | Age: 49
End: 2024-03-22
Payer: COMMERCIAL

## 2024-03-22 DIAGNOSIS — I21.4 NON-ST ELEVATION MYOCARDIAL INFARCTION (NSTEMI) (MULTI): ICD-10-CM

## 2024-03-22 PROCEDURE — 93798 PHYS/QHP OP CAR RHAB W/ECG: CPT | Performed by: INTERNAL MEDICINE

## 2024-03-25 ENCOUNTER — CLINICAL SUPPORT (OUTPATIENT)
Dept: CARDIAC REHAB | Facility: HOSPITAL | Age: 49
End: 2024-03-25
Payer: COMMERCIAL

## 2024-03-25 DIAGNOSIS — I21.4 NON-ST ELEVATION MYOCARDIAL INFARCTION (NSTEMI) (MULTI): ICD-10-CM

## 2024-03-25 PROCEDURE — 93798 PHYS/QHP OP CAR RHAB W/ECG: CPT | Performed by: INTERNAL MEDICINE

## 2024-03-27 ENCOUNTER — TELEPHONE (OUTPATIENT)
Dept: GENETICS | Facility: CLINIC | Age: 49
End: 2024-03-27
Payer: COMMERCIAL

## 2024-03-27 ENCOUNTER — CLINICAL SUPPORT (OUTPATIENT)
Dept: CARDIAC REHAB | Facility: HOSPITAL | Age: 49
End: 2024-03-27
Payer: COMMERCIAL

## 2024-03-27 DIAGNOSIS — I21.4 NON-ST ELEVATION MYOCARDIAL INFARCTION (NSTEMI) (MULTI): ICD-10-CM

## 2024-03-27 PROCEDURE — 93798 PHYS/QHP OP CAR RHAB W/ECG: CPT | Performed by: INTERNAL MEDICINE

## 2024-03-27 NOTE — TELEPHONE ENCOUNTER
I spoke with the patient this morning to provide an update from their provider Dr. Austin. I shared with the patient their order for cancer genetic testing is active and they are welcome to get their blood drawn when they are ready. The patient would like to review their cancer and cardiology genetic testing results together; their appointment on 3/28 will be rescheduled at this time as they cancer genetic testing results are not available.

## 2024-03-27 NOTE — TELEPHONE ENCOUNTER
I spoke with the patient this morning regarding their outstanding cancer genetic testing sample ordered by Dr. Austin earlier this month. The patient shared it was unclear which lab order their provider would like them to complete and expected a call back from our office to confirm. I clarified there is one pending genetic testing order from 3/5 and would follow up with their provider to confirm this information.

## 2024-03-28 ENCOUNTER — DOCUMENTATION (OUTPATIENT)
Dept: CARDIAC REHAB | Facility: HOSPITAL | Age: 49
End: 2024-03-28

## 2024-03-28 ENCOUNTER — APPOINTMENT (OUTPATIENT)
Dept: GENETICS | Facility: CLINIC | Age: 49
End: 2024-03-28
Payer: COMMERCIAL

## 2024-03-28 VITALS — HEIGHT: 63 IN | WEIGHT: 188.2 LBS | BODY MASS INDEX: 33.35 KG/M2

## 2024-03-28 NOTE — PROGRESS NOTES
Cardiac Rehabilitation 60 Day Reassessment    Name: Mandie Arevalo  Medical Record Number: [id number]  YOB: 1975  Age: 48 y.o.  Session: 20  Today’s Date: 3/28/2024  Primary Care Physician: Sam Rubio MD  Referring Physician: Gene Hermosillo  Program Location: [facility]  Primary Diagnosis: NSTEMI       Onset/Date of Diagnosis: 11/25/2023        AACVPR Risk Stratification: High    Falls Risk: Low  Psychosocial Assessment: Mandie completed a PHQ-2 assessment with a score of (+0). A PHQ-9 was not indicated at this time.AB  2/29/2024 30 Day Assessment: Mandie was encouraged to meet with Ger Pickard the staff psychologist and she is contemplating meeting with him. AB     Sent PH-Q 9 to MD if score > 20: Survey not yet completed.    Pt reported/currently experiencing stress: No  Patient uses stress management skills: Yes   History of: no history of anxiety or depression  Currently seeing a mental health provider: No  Social Support: Yes, Whom:Family and Friends  Quality of Life Survey:  PHQ-2  Learning Assessment:  Learning assessment/barriers: None  Preferred learning method: Auditory and Visual  Barriers: None  Comments:    Stages of Change:Action    Psychosocial Plan    Goal Status: In progress    Psychosocial Goals: Identify strategies for managing depression and Identify social supports  Learn to use stress management techniques  Improve or maintain quality of life survey      Psychosocial Interventions/Education: To be done in Cardiac Rehab.  Encourage attendance for stress management lecture.  Instruct patient on 3 minute breathing space or mindful breathing technique.  Patient approached about seeing a mental healthcare provider.  2/29/2024 30 Day Assessment: Mandie was questioned re: any new or increased symptoms of stress, anxiety, or depressions: stress management strategies were discussed. Mandie was encouraged to take time to be mindful daily. Mandie reports she  "watched movies and plays time to relax. Mandie was encouraged to meet with Ger Pickard the staff psychologist and she will consider.AB  3/28/2024 60 Day Evaluation: Mandie was questioned re: any new or increased symptoms of stress, anxiety, or depression: stress management strategies were discussed. Mandie reports she ignores stress and tries not to let it get to her. Mandie was encouraged to practice mindfulness. Mandie attended the education on Stress and Anxiety (3/11/2024).AB      Nutrition Assessment:    Hyperlipidemia: Yes     Lipids:   Lab Results   Component Value Date    CHOL 184 11/26/2023    HDL 48.5 11/26/2023    TRIG 105 11/26/2023       Current Dietary Guidelines: Low fat, Low sodium  Barriers to dietary change: no    Diet Habit Survey: Fat Blocking Screener Given  Pre:   Post:   2/29/2024 30 Day Assessment: Mandie was reissued a Fat Block Screener her initial was not returned.AB    Diabetes Assessment    Lab Results   Component Value Date    HGBA1C 5.5 11/26/2023       History of Diabetes: No    Weight Management:Body mass index is 33.34 kg/m².  5 ft 3 inches  183.60lbs  2/29/2024 30 Day Assessment:   190.4 lbs  3/28/2024 60 Day Evaluation:   188.2 lbs     Nutrition Plan    Goal Status: In progress    Nutrition Goals: Lipid Goal: HDL>45, LDL <70, Total <180, Trigs <150, Adapt a Mediterranean focused diet prior to discharge, and Lose 1lb/week while enrolled in program  Achieve a BMI between 20-25%  Lower BMI to <31% by program completion    Nutrition Interventions/Education:   To be done in Cardiac Rehab.  Encourage attending educational lectures  Booklet given \"Heart Attack-Bouncing Back\"  Encourage patient to follow a heart healthy diet  Encouraged to meet with a  out patient dietician  Lipid profile reviewed   Fat Block Screener Given.  2/29/2024 30 Day Assessment: Mandie reports she is scheduled for fasting lipids next week, but no new lipids were reported. Mandie reports she is " eating a heat healthy diet, but her weight is up 6 lbs in the last 30 days since enrolling into cardiac rehab. Mandie attended the education on Healthy Eating on (2/19/2024).AB  3/28/2024 60 Day Evaluation: Mandie reports eating a heart healthy diet no new fasting lipids were reported. Mandie's weight is down 3 lbs in the last 30 days. Mandie attended the education on Cholesterol (3/6/2024).AB      Exercise Assessment    No  Mode: NA  Frequency: NA  Duration: NA    Exercise Prescription     Exercise Prescription based on: 6 Minute Walk Test    Pre: 1465ft / 2.8 mph / 3.1 mets          Frequency:  3 days/week   Mode: Treadmill, Recumbent Cycle, Arm Ergometer, and SciFit   Duration: 40 total aerobic minutes   Intensity: RPE 11-14  Target HR:   103-141  MET Level: 3-6  Patient wears supplemental O2: No     Modality Workload METs Duration (minutes)   1 Pre-Exercise   5 minutes   2 SFS 2.0 3.4 10 :00   3 Treadmill 2.7@0.5% 3.3 10 :00   4 UBE 1.8 3.0 10 :00   5 Recumbent Bike 6 3.2 10 :00   6 Post-Exercise   5 minutes     Resistance Training:  Will Instruct  Home Exercise Prescription given: To be given prior to discharge from program.    Exercise Plan    Goal Status: In progress    Exercise Goals: Increase total exercise duration to 30-45 minutes, Obtain 150 minutes/week of moderate intensity aerobic exercise, and Establish a home exercise program before discharge  Maintain 150 minutes of aerobic exercise per week with Met levels ranging from 3-6 Mets by the end of the program.  Exercising most days of the week for 30 minutes a session.  Muscle toning 2-3 days per week    Exercise Interventions/Education:   To be done in Cardiac Rehab.  Exercise prescription based on 6 MWT  Exercise changes made based on HR an RPE scale in response to exercise  Increase exercise duration by 1-2 minutes per modality for a total of 40 minutes per session  Target goal for duration 40 minutes per session, increase Met levels by 5-10%  every 30 days or as tolerated  Encouraged home exercise on days not at rehab  2/29/2024 30 Day Assessment: Mandie is attending her rehab sessions consistently. On her last session Mandie exercised 40 minutes with Met levels ranging from 2.3-3. Over the next 30 days we will plan to progress her Met levels by 5% on the UBE, TM, and SFS. Mandie was encouraged to start exercising on her own days not at rehab. Mandie attended the Exercise education on (2/12/2024).AB  3/28/2024 60 Day Evaluation: Mandie continues to attend her rehab sessions consistently. On her last session Mandie exercised 40 minutes with Met levels ranging from 3-3.4. Over the next 30 days we will plan to progress her Met levels by 5% on the TM, SFS, and UBE. Mandie reports she is walking most days of the week on her own outside of rehab.AB      Other Core Components/Risk Factor Assessment:    Medication adherence  Current Medications:   Current Outpatient Medications:     acetaminophen (Tylenol) 325 mg tablet, Take 2 tablets (650 mg) by mouth every 4 hours if needed., Disp: , Rfl:     apixaban (Eliquis) 5 mg (74 tabs) tablet, Take 1 tablet (5 mg) by mouth 2 times a day, Disp: 74 tablet, Rfl: 0    aspirin 81 mg chewable tablet, Chew 1 tablet (81 mg) once daily., Disp: 90 tablet, Rfl: 3    atorvastatin (Lipitor) 40 mg tablet, Take 1 tablet (40 mg) by mouth once daily at bedtime., Disp: 90 tablet, Rfl: 3    FLUoxetine (PROzac) 20 mg capsule, 1 capsule (20 mg) once every 24 hours., Disp: , Rfl:     metoprolol succinate XL (Toprol-XL) 50 mg 24 hr tablet, Take 1.5 tablets (75 mg) by mouth once daily. Do not crush or chew., Disp: 135 tablet, Rfl: 3    nitroglycerin (Nitrostat) 0.4 mg SL tablet, Place 1 tablet (0.4 mg) under the tongue every 5 minutes if needed for chest pain for up to 5 doses., Disp: 5 tablet, Rfl: 3    triamcinolone (Kenalog) 0.1 % cream, APPLY AS NEEDED TWICE DAILY TO FLARES, Disp: , Rfl:                      Medication  compliance: Yes   Uses pill box/organizer: Yes    Carries medication list: Yes     Blood Pressure Management  History of Hypertension: No   Medication Changes: No   Resting BP:  111/65       Heart Failure Management  Hx of Heart Failure: No    Smoking/Tobacco Assessment  Social History     Tobacco Use   Smoking Status Never   Smokeless Tobacco Never       Other Core Component Plan    Goal Status: In progress    Other Core Component Goals: Medication compliance and Achieve resting BP of < 130/80 by discharge  Encourage patient to be knowledgeable on medication usage and actions by discharge.  Encourage patient to carry an updated medication list      Other Core Component Interventions/Education:   Carries updated medication list.  Check resting blood pressures pre and post exercise  Following a low sodium/2300 mg sodium diet  Medication list provided  Reviewed effects of exercise on blood pressures  2/29/2024 30 Day Assessment: Mandie reports she is knowledgeable of her prescribed medications and compliant with her prescribed medications. Yajairas blood pressures remain at the target of <130/80. Mandie reports she is eating a low sodium diet. Mandie attended the education on Heart Failure (2/12/2024).AB  3/28/2024 60 Day Evaluation: Mandie reports the addition of Metoprolol 75 mg tab to her prescribed medications. Mandie is knowledgeable of her medications and reports medications compliance. Mandie's blood pressures at target <130/80. Mandie attend the education on Blood Pressure (3/18/2024) and Heart Parts (3/25/2024).AB    Individual Patient Goals:    To develop a heart healthy exercise routine of 150 minutes per week with Met levels greater than 3 by the end of the program, and be able to return to completing required daily tasks at home without symptoms  To lose 1 lb per week while enrolled in the cardiac rehab program.     Goal Status: In progress    Staff Comments:  Reviewed exercise time, Met  levels, and RPE scale of heart healthy exercise.AB  2/29/2024 30 Day Assessment: Mandie reports she has occasionally experienced a fluttering feeling in her heart but not while at rehab and is scheduled for a stress echo next week. Mandie remains free from falls since enrolled in the rehab program.AB  3/28/2024 60 Day Evaluation: Mandie reports increased Metoprolol dosages after her echo and reports she is scheduled for a cardiac mri next week. AB     Rehab Staff Signature: Elizabeth Talavera RN

## 2024-03-29 ENCOUNTER — CLINICAL SUPPORT (OUTPATIENT)
Dept: CARDIAC REHAB | Facility: HOSPITAL | Age: 49
End: 2024-03-29
Payer: COMMERCIAL

## 2024-03-29 DIAGNOSIS — I21.4 NON-ST ELEVATION MYOCARDIAL INFARCTION (NSTEMI) (MULTI): ICD-10-CM

## 2024-03-29 PROCEDURE — 93798 PHYS/QHP OP CAR RHAB W/ECG: CPT | Performed by: INTERNAL MEDICINE

## 2024-04-01 ENCOUNTER — CLINICAL SUPPORT (OUTPATIENT)
Dept: CARDIAC REHAB | Facility: HOSPITAL | Age: 49
End: 2024-04-01
Payer: COMMERCIAL

## 2024-04-01 DIAGNOSIS — I21.4 NON-ST ELEVATION MYOCARDIAL INFARCTION (NSTEMI) (MULTI): ICD-10-CM

## 2024-04-01 PROCEDURE — 93798 PHYS/QHP OP CAR RHAB W/ECG: CPT | Performed by: INTERNAL MEDICINE

## 2024-04-03 ENCOUNTER — CLINICAL SUPPORT (OUTPATIENT)
Dept: CARDIAC REHAB | Facility: HOSPITAL | Age: 49
End: 2024-04-03
Payer: COMMERCIAL

## 2024-04-03 DIAGNOSIS — I21.4 NON-ST ELEVATION MYOCARDIAL INFARCTION (NSTEMI) (MULTI): ICD-10-CM

## 2024-04-03 PROCEDURE — 93798 PHYS/QHP OP CAR RHAB W/ECG: CPT | Performed by: INTERNAL MEDICINE

## 2024-04-04 ENCOUNTER — HOSPITAL ENCOUNTER (OUTPATIENT)
Dept: CARDIOLOGY | Facility: HOSPITAL | Age: 49
Discharge: HOME | End: 2024-04-04
Payer: COMMERCIAL

## 2024-04-04 ENCOUNTER — HOSPITAL ENCOUNTER (OUTPATIENT)
Dept: RADIOLOGY | Facility: HOSPITAL | Age: 49
Discharge: HOME | End: 2024-04-04
Payer: COMMERCIAL

## 2024-04-04 DIAGNOSIS — I25.3: ICD-10-CM

## 2024-04-04 DIAGNOSIS — R07.9 ACUTE CHEST PAIN: Primary | ICD-10-CM

## 2024-04-04 DIAGNOSIS — I42.2: ICD-10-CM

## 2024-04-04 DIAGNOSIS — I20.89 STABLE ANGINA PECTORIS (CMS-HCC): ICD-10-CM

## 2024-04-04 DIAGNOSIS — I25.42 SPONTANEOUS DISSECTION OF CORONARY ARTERY: ICD-10-CM

## 2024-04-04 DIAGNOSIS — I51.3 LV (LEFT VENTRICULAR) MURAL THROMBUS: ICD-10-CM

## 2024-04-04 PROCEDURE — 75563 CARD MRI W/STRESS IMG & DYE: CPT | Performed by: INTERNAL MEDICINE

## 2024-04-04 PROCEDURE — A9575 INJ GADOTERATE MEGLUMI 0.1ML: HCPCS | Performed by: STUDENT IN AN ORGANIZED HEALTH CARE EDUCATION/TRAINING PROGRAM

## 2024-04-04 PROCEDURE — 75565 CARD MRI VELOC FLOW MAPPING: CPT | Performed by: INTERNAL MEDICINE

## 2024-04-04 PROCEDURE — 75563 CARD MRI W/STRESS IMG & DYE: CPT

## 2024-04-04 PROCEDURE — 2550000001 HC RX 255 CONTRASTS: Performed by: STUDENT IN AN ORGANIZED HEALTH CARE EDUCATION/TRAINING PROGRAM

## 2024-04-04 PROCEDURE — 2500000004 HC RX 250 GENERAL PHARMACY W/ HCPCS (ALT 636 FOR OP/ED): Performed by: STUDENT IN AN ORGANIZED HEALTH CARE EDUCATION/TRAINING PROGRAM

## 2024-04-04 PROCEDURE — 93005 ELECTROCARDIOGRAM TRACING: CPT

## 2024-04-04 RX ORDER — GADOTERATE MEGLUMINE 376.9 MG/ML
25 INJECTION INTRAVENOUS
Status: COMPLETED | OUTPATIENT
Start: 2024-04-04 | End: 2024-04-04

## 2024-04-04 RX ORDER — REGADENOSON 0.08 MG/ML
0.4 INJECTION, SOLUTION INTRAVENOUS
Status: COMPLETED | OUTPATIENT
Start: 2024-04-04 | End: 2024-04-04

## 2024-04-04 RX ORDER — AMINOPHYLLINE 25 MG/ML
6 INJECTION, SOLUTION INTRAVENOUS ONCE
Status: COMPLETED | OUTPATIENT
Start: 2024-04-04 | End: 2024-04-04

## 2024-04-04 RX ADMIN — REGADENOSON 0.4 MG: 0.08 INJECTION, SOLUTION INTRAVENOUS at 07:00

## 2024-04-04 RX ADMIN — AMINOPHYLLINE 100 MG: 25 INJECTION, SOLUTION INTRAVENOUS at 08:30

## 2024-04-04 RX ADMIN — GADOTERATE MEGLUMINE 25 ML: 376.9 INJECTION INTRAVENOUS at 09:45

## 2024-04-05 ENCOUNTER — CLINICAL SUPPORT (OUTPATIENT)
Dept: CARDIAC REHAB | Facility: HOSPITAL | Age: 49
End: 2024-04-05
Payer: COMMERCIAL

## 2024-04-05 DIAGNOSIS — I21.4 NON-ST ELEVATION MYOCARDIAL INFARCTION (NSTEMI) (MULTI): ICD-10-CM

## 2024-04-05 PROCEDURE — 93798 PHYS/QHP OP CAR RHAB W/ECG: CPT | Performed by: INTERNAL MEDICINE

## 2024-04-08 ENCOUNTER — CLINICAL SUPPORT (OUTPATIENT)
Dept: CARDIAC REHAB | Facility: HOSPITAL | Age: 49
End: 2024-04-08
Payer: COMMERCIAL

## 2024-04-08 ENCOUNTER — TELEPHONE (OUTPATIENT)
Dept: CARDIOLOGY | Facility: CLINIC | Age: 49
End: 2024-04-08

## 2024-04-08 DIAGNOSIS — I21.4 NON-ST ELEVATION MYOCARDIAL INFARCTION (NSTEMI) (MULTI): ICD-10-CM

## 2024-04-08 LAB
ATRIAL RATE: 61 BPM
P AXIS: 36 DEGREES
P OFFSET: 190 MS
P ONSET: 135 MS
PR INTERVAL: 170 MS
Q ONSET: 220 MS
QRS COUNT: 10 BEATS
QRS DURATION: 86 MS
QT INTERVAL: 410 MS
QTC CALCULATION(BAZETT): 412 MS
QTC FREDERICIA: 412 MS
R AXIS: 28 DEGREES
T AXIS: 31 DEGREES
T OFFSET: 425 MS
VENTRICULAR RATE: 61 BPM

## 2024-04-08 PROCEDURE — 93798 PHYS/QHP OP CAR RHAB W/ECG: CPT | Performed by: INTERNAL MEDICINE

## 2024-04-08 NOTE — TELEPHONE ENCOUNTER
4/8/24  1352  Called results to patient and informed her to stop taking Eliquis.  Also informed if a sooner appt with Dr. Hermosillo before Oct is warranted Ryley would notify.    Patient verbalized understanding of results and to stop taking Eliquis.      ----- Message from Gene Hermosillo MD sent at 4/6/2024  9:56 PM EDT -----  Plz let her know of results. She can stop her eliquis now. Routine shcedule apt with me. Plz let me know when you talk to her and she understands no more eliquis is needed.   ----- Message -----  From: Interface, Radiology Results In  Sent: 4/4/2024  12:58 PM EDT  To: Gene Hermosillo MD

## 2024-04-09 ENCOUNTER — TELEPHONE (OUTPATIENT)
Dept: CARDIOLOGY | Facility: CLINIC | Age: 49
End: 2024-04-09
Payer: COMMERCIAL

## 2024-04-09 NOTE — TELEPHONE ENCOUNTER
----- Message from Gene Hermosillo MD sent at 4/8/2024  4:53 PM EDT -----  That is okay.   ----- Message -----  From: Fabián Ribeiro RN  Sent: 4/8/2024   1:45 PM EDT  To: Gene Hermosillo MD    She sees you in October of this year. If you want it sooner please let me know.  ----- Message -----  From: Gene Hermosillo MD  Sent: 4/6/2024   9:57 PM EDT  To: Fabián Ribeiro RN; Ryley Cecil Plz let her know of results. She can stop her eliquis now. Routine shcedule apt with me. Plz let me know when you talk to her and she understands no more eliquis is needed.   ----- Message -----  From: Interface, Radiology Results In  Sent: 4/4/2024  12:58 PM EDT  To: Gene Hermosillo MD

## 2024-04-10 ENCOUNTER — CLINICAL SUPPORT (OUTPATIENT)
Dept: CARDIAC REHAB | Facility: HOSPITAL | Age: 49
End: 2024-04-10
Payer: COMMERCIAL

## 2024-04-10 DIAGNOSIS — I21.4 NON-ST ELEVATION MYOCARDIAL INFARCTION (NSTEMI) (MULTI): ICD-10-CM

## 2024-04-10 PROCEDURE — 93798 PHYS/QHP OP CAR RHAB W/ECG: CPT | Performed by: INTERNAL MEDICINE

## 2024-04-12 ENCOUNTER — CLINICAL SUPPORT (OUTPATIENT)
Dept: CARDIAC REHAB | Facility: HOSPITAL | Age: 49
End: 2024-04-12
Payer: COMMERCIAL

## 2024-04-12 DIAGNOSIS — I21.4 NON-ST ELEVATION MYOCARDIAL INFARCTION (NSTEMI) (MULTI): ICD-10-CM

## 2024-04-12 PROCEDURE — 93798 PHYS/QHP OP CAR RHAB W/ECG: CPT | Performed by: INTERNAL MEDICINE

## 2024-04-15 ENCOUNTER — CLINICAL SUPPORT (OUTPATIENT)
Dept: CARDIAC REHAB | Facility: HOSPITAL | Age: 49
End: 2024-04-15
Payer: COMMERCIAL

## 2024-04-15 DIAGNOSIS — I21.4 NON-ST ELEVATION MYOCARDIAL INFARCTION (NSTEMI) (MULTI): ICD-10-CM

## 2024-04-15 PROCEDURE — 93798 PHYS/QHP OP CAR RHAB W/ECG: CPT | Performed by: INTERNAL MEDICINE

## 2024-04-17 ENCOUNTER — CLINICAL SUPPORT (OUTPATIENT)
Dept: CARDIAC REHAB | Facility: HOSPITAL | Age: 49
End: 2024-04-17
Payer: COMMERCIAL

## 2024-04-17 DIAGNOSIS — I21.4 NON-ST ELEVATION MYOCARDIAL INFARCTION (NSTEMI) (MULTI): ICD-10-CM

## 2024-04-17 PROCEDURE — 93798 PHYS/QHP OP CAR RHAB W/ECG: CPT | Performed by: INTERNAL MEDICINE

## 2024-04-19 ENCOUNTER — CLINICAL SUPPORT (OUTPATIENT)
Dept: CARDIAC REHAB | Facility: HOSPITAL | Age: 49
End: 2024-04-19
Payer: COMMERCIAL

## 2024-04-19 DIAGNOSIS — I21.4 NON-ST ELEVATION MYOCARDIAL INFARCTION (NSTEMI) (MULTI): ICD-10-CM

## 2024-04-19 PROCEDURE — 93798 PHYS/QHP OP CAR RHAB W/ECG: CPT | Performed by: INTERNAL MEDICINE

## 2024-04-22 ENCOUNTER — CLINICAL SUPPORT (OUTPATIENT)
Dept: CARDIAC REHAB | Facility: HOSPITAL | Age: 49
End: 2024-04-22
Payer: COMMERCIAL

## 2024-04-22 DIAGNOSIS — I21.4 NON-ST ELEVATION MYOCARDIAL INFARCTION (NSTEMI) (MULTI): ICD-10-CM

## 2024-04-22 PROCEDURE — 93798 PHYS/QHP OP CAR RHAB W/ECG: CPT | Performed by: INTERNAL MEDICINE

## 2024-04-23 LAB
ATRIAL RATE: 59 BPM
P AXIS: 42 DEGREES
P OFFSET: 190 MS
P ONSET: 132 MS
PR INTERVAL: 176 MS
Q ONSET: 220 MS
QRS COUNT: 10 BEATS
QRS DURATION: 80 MS
QT INTERVAL: 430 MS
QTC CALCULATION(BAZETT): 425 MS
QTC FREDERICIA: 427 MS
R AXIS: 80 DEGREES
T AXIS: 47 DEGREES
T OFFSET: 435 MS
VENTRICULAR RATE: 59 BPM

## 2024-04-24 ENCOUNTER — CLINICAL SUPPORT (OUTPATIENT)
Dept: CARDIAC REHAB | Facility: HOSPITAL | Age: 49
End: 2024-04-24
Payer: COMMERCIAL

## 2024-04-24 DIAGNOSIS — I21.4 NON-ST ELEVATION MYOCARDIAL INFARCTION (NSTEMI) (MULTI): ICD-10-CM

## 2024-04-24 PROCEDURE — 93798 PHYS/QHP OP CAR RHAB W/ECG: CPT | Performed by: INTERNAL MEDICINE

## 2024-04-25 ENCOUNTER — DOCUMENTATION (OUTPATIENT)
Dept: CARDIAC REHAB | Facility: HOSPITAL | Age: 49
End: 2024-04-25
Payer: COMMERCIAL

## 2024-04-25 VITALS — HEIGHT: 63 IN | WEIGHT: 187 LBS | BODY MASS INDEX: 33.13 KG/M2

## 2024-04-25 NOTE — PROGRESS NOTES
Cardiac Rehabilitation 90 Day Reassessment    Name: Mandie Arevalo  Medical Record Number: [id number]  YOB: 1975  Age: 49 y.o.  Session: 32  Today’s Date: 4/25/2024  Primary Care Physician: Sam Rubio MD  Referring Physician: Gene Hermosillo  Program Location: [facility]  Primary Diagnosis: NSTEMI       Onset/Date of Diagnosis: 11/25/2023        AACVPR Risk Stratification: High    Falls Risk: Low  Psychosocial Assessment: Mandie completed a PHQ-2 assessment with a score of (+0). A PHQ-9 was not indicated at this time.AB  2/29/2024 30 Day Assessment: Mandie was encouraged to meet with Ger Pickard the staff psychologist and she is contemplating meeting with him. AB   4/25/2024 90 day evaluation: Mandie had contemplated meeting with Ger but reports she no longer feels this is needed.AB    Sent PH-Q 9 to MD if score > 20: Survey not yet completed.    Pt reported/currently experiencing stress: No  Patient uses stress management skills: Yes   History of: no history of anxiety or depression  Currently seeing a mental health provider: No  Social Support: Yes, Whom:Family and Friends  Quality of Life Survey:  PHQ-2  Learning Assessment:  Learning assessment/barriers: None  Preferred learning method: Auditory and Visual  Barriers: None  Comments:    Stages of Change:Action    Psychosocial Plan    Goal Status: In progress    Psychosocial Goals: Identify strategies for managing depression and Identify social supports  Learn to use stress management techniques  Improve or maintain quality of life survey      Psychosocial Interventions/Education: To be done in Cardiac Rehab.  Encourage attendance for stress management lecture.  Instruct patient on 3 minute breathing space or mindful breathing technique.  Patient approached about seeing a mental healthcare provider.  2/29/2024 30 Day Assessment: Mandie was questioned re: any new or increased symptoms of stress, anxiety, or depressions:  stress management strategies were discussed. Mandie was encouraged to take time to be mindful daily. Mandie reports she watched movies and plays time to relax. Mandie was encouraged to meet with Ger Pickard the staff psychologist and she will consider.AB  3/28/2024 60 Day Evaluation: Mandie was questioned re: any new or increased symptoms of stress, anxiety, or depression: stress management strategies were discussed. Mandie reports she ignores stress and tries not to let it get to her. Mandie was encouraged to practice mindfulness. Mandie attended the education on Stress and Anxiety (3/11/2024).AB  4/25/2024 90 day evaluation: Mandie was questioned re: any new or increased symptoms of stress, anxiety, or depressions: stress management strategies were discussed. Mandie was encouraged to take time to be mindful everyday. Mandie reports she uses watching movies and reading in the evening as a stress relief technique. Mandie reports having less symptoms of stress and depression now that she can do more and her health has improved.AB      Nutrition Assessment:    Hyperlipidemia: Yes     Lipids:   Lab Results   Component Value Date    CHOL 184 11/26/2023    HDL 48.5 11/26/2023    TRIG 105 11/26/2023       Current Dietary Guidelines: Low fat, Low sodium  Barriers to dietary change: no    Diet Habit Survey: Fat Blocking Screener Given  Pre: Declined  Post:   2/29/2024 30 Day Assessment: Mandie was reissued a Fat Block Screener her initial was not returned.AB  4/19/2024 Post Fat Screener Given.AB  Diabetes Assessment    Lab Results   Component Value Date    HGBA1C 5.5 11/26/2023       History of Diabetes: No    Weight Management:Body mass index is 33.13 kg/m².  5 ft 3 inches  183.60lbs  2/29/2024 30 Day Assessment:   190.4 lbs  3/28/2024 60 Day Evaluation:   188.2 lbs   4/25/2024 90 day evaluation:   187 lbs    Nutrition Plan    Goal Status: In progress    Nutrition Goals: Lipid Goal: HDL>45, LDL <70,  "Total <180, Trigs <150, Adapt a Mediterranean focused diet prior to discharge, and Lose 1lb/week while enrolled in program  Achieve a BMI between 20-25%  Lower BMI to <31% by program completion    Nutrition Interventions/Education:   To be done in Cardiac Rehab.  Encourage attending educational lectures  Booklet given \"Heart Attack-Bouncing Back\"  Encourage patient to follow a heart healthy diet  Encouraged to meet with a  out patient dietician  Lipid profile reviewed   Fat Block Screener Given.  2/29/2024 30 Day Assessment: Mandie reports she is scheduled for fasting lipids next week, but no new lipids were reported. Mandie reports she is eating a heat healthy diet, but her weight is up 6 lbs in the last 30 days since enrolling into cardiac rehab. Mandie attended the education on Healthy Eating on (2/19/2024).AB  3/28/2024 60 Day Evaluation: Mandie reports eating a heart healthy diet no new fasting lipids were reported. Mandie's weight is down 3 lbs in the last 30 days. Mandie attended the education on Cholesterol (3/6/2024).AB  4/25/2024 90 day evaluation: Mandie denies any new fasting lipids and she reports eating a heart healthy diet. Mandie reports trying to eliminate high fat foods from her diet. Mandie attended the education on Healthy Eating (4/22/2024) where proper portion sizes were discussed. AB    Exercise Assessment    No  Mode: NA  Frequency: NA  Duration: NA    Exercise Prescription     Exercise Prescription based on: 6 Minute Walk Test    Pre: 1465ft / 2.8 mph / 3.1 mets          Frequency:  3 days/week   Mode: Treadmill, Recumbent Cycle, Arm Ergometer, and SciFit   Duration: 40 total aerobic minutes   Intensity: RPE 11-14  Target HR:   103-141  MET Level: 3-6  Patient wears supplemental O2: No     Modality Workload METs Duration (minutes)   1 Pre-Exercise   5 minutes   2 SFS 3.5 4.4 10 :00   3 Treadmill 3.2@2.5% 4.6 10 :00   4 UBE 3.5 3.8 10 :00   5 Recumbent Bike 10 4.3 10 :00 "   6 Post-Exercise   5 minutes     Resistance Training: Yes   Home Exercise Prescription given: To be given prior to discharge from program.  4/15/2024 Instructed on hand weight use for home with 2 lb weights and patient returned demonstration.   Exercise Plan    Goal Status: In progress    Exercise Goals: Increase total exercise duration to 30-45 minutes, Obtain 150 minutes/week of moderate intensity aerobic exercise, and Establish a home exercise program before discharge  Maintain 150 minutes of aerobic exercise per week with Met levels ranging from 3-6 Mets by the end of the program.  Exercising most days of the week for 30 minutes a session.  Muscle toning 2-3 days per week    Exercise Interventions/Education:   To be done in Cardiac Rehab.  Exercise prescription based on 6 MWT  Exercise changes made based on HR an RPE scale in response to exercise  Increase exercise duration by 1-2 minutes per modality for a total of 40 minutes per session  Target goal for duration 40 minutes per session, increase Met levels by 5-10% every 30 days or as tolerated  Encouraged home exercise on days not at rehab  2/29/2024 30 Day Assessment: Mandie is attending her rehab sessions consistently. On her last session Mandie exercised 40 minutes with Met levels ranging from 2.3-3. Over the next 30 days we will plan to progress her Met levels by 5% on the UBE, TM, and SFS. Mandie was encouraged to start exercising on her own days not at rehab. Mandie attended the Exercise education on (2/12/2024).AB  3/28/2024 60 Day Evaluation: Mandie continues to attend her rehab sessions consistently. On her last session Mandie exercised 40 minutes with Met levels ranging from 3-3.4. Over the next 30 days we will plan to progress her Met levels by 5% on the TM, SFS, and UBE. Mandie reports she is walking most days of the week on her own outside of rehab.AB  4/25/2024 90 day evaluation: Mandie continues to attend her rehab sessions  consistently. On her last session Mandie exercised 40 minutes with Met levels ranging from 3.8-4.6. Over the next 30 days we will plan to progress her met levels by 5% on the SFS, UBE, and TM. Mandie was instructed on 2 lb hand weights for home use and she returned demonstration. Mandie was encouraged to exercise days not at rehab.AB    Other Core Components/Risk Factor Assessment:    Medication adherence  Current Medications:   Current Outpatient Medications:     acetaminophen (Tylenol) 325 mg tablet, Take 2 tablets (650 mg) by mouth every 4 hours if needed., Disp: , Rfl:     apixaban (Eliquis) 5 mg (74 tabs) tablet, Take 1 tablet (5 mg) by mouth 2 times a day, Disp: 74 tablet, Rfl: 0    aspirin 81 mg chewable tablet, Chew 1 tablet (81 mg) once daily., Disp: 90 tablet, Rfl: 3    atorvastatin (Lipitor) 40 mg tablet, Take 1 tablet (40 mg) by mouth once daily at bedtime., Disp: 90 tablet, Rfl: 3    FLUoxetine (PROzac) 20 mg capsule, 1 capsule (20 mg) once every 24 hours., Disp: , Rfl:     metoprolol succinate XL (Toprol-XL) 50 mg 24 hr tablet, Take 1.5 tablets (75 mg) by mouth once daily. Do not crush or chew., Disp: 135 tablet, Rfl: 3    nitroglycerin (Nitrostat) 0.4 mg SL tablet, Place 1 tablet (0.4 mg) under the tongue every 5 minutes if needed for chest pain for up to 5 doses., Disp: 5 tablet, Rfl: 3    triamcinolone (Kenalog) 0.1 % cream, APPLY AS NEEDED TWICE DAILY TO FLARES, Disp: , Rfl:                      Medication compliance: Yes   Uses pill box/organizer: Yes    Carries medication list: Yes     Blood Pressure Management  History of Hypertension: No   Medication Changes: No   Resting BP:  111/65       Heart Failure Management  Hx of Heart Failure: No    Smoking/Tobacco Assessment  Social History     Tobacco Use   Smoking Status Never   Smokeless Tobacco Never       Other Core Component Plan    Goal Status: In progress    Other Core Component Goals: Medication compliance and Achieve resting BP of <  130/80 by discharge  Encourage patient to be knowledgeable on medication usage and actions by discharge.  Encourage patient to carry an updated medication list      Other Core Component Interventions/Education:   Carries updated medication list.  Check resting blood pressures pre and post exercise  Following a low sodium/2300 mg sodium diet  Medication list provided  Reviewed effects of exercise on blood pressures  2/29/2024 30 Day Assessment: Mandie reports she is knowledgeable of her prescribed medications and compliant with her prescribed medications. Mandie's blood pressures remain at the target of <130/80. Mandie reports she is eating a low sodium diet. Mandie attended the education on Heart Failure (2/12/2024).AB  3/28/2024 60 Day Evaluation: Mandie reports the addition of Metoprolol 75 mg tab to her prescribed medications. Mandie is knowledgeable of her medications and reports medications compliance. Yajairas blood pressures at target <130/80. Mandie attend the education on Blood Pressure (3/18/2024) and Heart Parts (3/25/2024).AB  4/25/2024 90 day evaluation: Mandie is knowledgeable of her prescribed medications and she reports medication compliance. Mandie maintains blood pressures at the target goal of <130/80. Mandie reports eating a diet that is low in sodium. Mandie reports carrying an updated medication list.AB    Individual Patient Goals:    To develop a heart healthy exercise routine of 150 minutes per week with Met levels greater than 3 by the end of the program, and be able to return to completing required daily tasks at home without symptoms  To lose 1 lb per week while enrolled in the cardiac rehab program.     Goal Status: In progress    Staff Comments:  Reviewed exercise time, Met levels, and RPE scale of heart healthy exercise.AB  2/29/2024 30 Day Assessment: Mandie reports she has occasionally experienced a fluttering feeling in her heart but not while at rehab and  is scheduled for a stress echo next week. Mandie remains free from falls since enrolled in the rehab program.AB  3/28/2024 60 Day Evaluation: Mandie reports increased Metoprolol dosages after her echo and reports she is scheduled for a cardiac mri next week. AB   4/25/2024 90 day evaluation: Mandie continues attending her rehab sessions consistently and she was encouraged to start exercising on her own days not at rehab. Mandie remains free from falls.AB    Rehab Staff Signature: Elizabeth Talavera RN

## 2024-04-26 ENCOUNTER — CLINICAL SUPPORT (OUTPATIENT)
Dept: CARDIAC REHAB | Facility: HOSPITAL | Age: 49
End: 2024-04-26
Payer: COMMERCIAL

## 2024-04-26 DIAGNOSIS — I21.4 NON-ST ELEVATION MYOCARDIAL INFARCTION (NSTEMI) (MULTI): ICD-10-CM

## 2024-04-26 PROCEDURE — 93798 PHYS/QHP OP CAR RHAB W/ECG: CPT | Performed by: INTERNAL MEDICINE

## 2024-04-29 ENCOUNTER — CLINICAL SUPPORT (OUTPATIENT)
Dept: CARDIAC REHAB | Facility: HOSPITAL | Age: 49
End: 2024-04-29
Payer: COMMERCIAL

## 2024-04-29 DIAGNOSIS — I21.4 NON-ST ELEVATION MYOCARDIAL INFARCTION (NSTEMI) (MULTI): ICD-10-CM

## 2024-04-29 PROCEDURE — 93798 PHYS/QHP OP CAR RHAB W/ECG: CPT | Performed by: INTERNAL MEDICINE

## 2024-05-01 ENCOUNTER — CLINICAL SUPPORT (OUTPATIENT)
Dept: CARDIAC REHAB | Facility: HOSPITAL | Age: 49
End: 2024-05-01
Payer: COMMERCIAL

## 2024-05-01 DIAGNOSIS — I21.4 NON-ST ELEVATION MYOCARDIAL INFARCTION (NSTEMI) (MULTI): ICD-10-CM

## 2024-05-01 PROCEDURE — 93798 PHYS/QHP OP CAR RHAB W/ECG: CPT | Performed by: INTERNAL MEDICINE

## 2024-05-03 ENCOUNTER — CLINICAL SUPPORT (OUTPATIENT)
Dept: CARDIAC REHAB | Facility: HOSPITAL | Age: 49
End: 2024-05-03
Payer: COMMERCIAL

## 2024-05-03 DIAGNOSIS — I21.4 NON-ST ELEVATION MYOCARDIAL INFARCTION (NSTEMI) (MULTI): ICD-10-CM

## 2024-05-03 PROCEDURE — 93798 PHYS/QHP OP CAR RHAB W/ECG: CPT | Performed by: INTERNAL MEDICINE

## 2024-05-06 ENCOUNTER — CLINICAL SUPPORT (OUTPATIENT)
Dept: CARDIAC REHAB | Facility: HOSPITAL | Age: 49
End: 2024-05-06
Payer: COMMERCIAL

## 2024-05-06 VITALS — BODY MASS INDEX: 33.56 KG/M2 | WEIGHT: 189.4 LBS | HEIGHT: 63 IN

## 2024-05-06 DIAGNOSIS — I21.4 NON-ST ELEVATION MYOCARDIAL INFARCTION (NSTEMI) (MULTI): ICD-10-CM

## 2024-05-06 PROCEDURE — 93798 PHYS/QHP OP CAR RHAB W/ECG: CPT | Performed by: INTERNAL MEDICINE

## 2024-05-06 NOTE — PROGRESS NOTES
Cardiac Rehabilitation Discharge Summary    Name: Mandie Arevalo  Medical Record Number: [id number]  YOB: 1975  Age: 49 y.o.  Session: 37  Today’s Date: 5/6/2024  Primary Care Physician: Sam Rubio MD  Referring Physician: Gene Hermosillo  Program Location: [facility]  Primary Diagnosis: NSTEMI       Onset/Date of Diagnosis: 11/25/2023        AACVPR Risk Stratification: High    Falls Risk: Low  Psychosocial Assessment: Mandie completed a PHQ-2 assessment with a score of (+0). A PHQ-9 was not indicated at this time.AB  2/29/2024 30 Day Assessment: Mandie was encouraged to meet with Ger Pickard the staff psychologist and she is contemplating meeting with him. AB   4/25/2024 90 day evaluation: Mandie had contemplated meeting with Ger but reports she no longer feels this is needed.AB  5/6/2024 Discharge Mandie's PHQ-2 assessment score remains (+0). A PHQ-9 assessment was not indicated.AB    Sent PH-Q 9 to MD if score > 20: Survey not yet completed.    Pt reported/currently experiencing stress: No  Patient uses stress management skills: Yes   History of: no history of anxiety or depression  Currently seeing a mental health provider: No  Social Support: Yes, Whom:Family and Friends  Quality of Life Survey:  PHQ-2  Learning Assessment:  Learning assessment/barriers: None  Preferred learning method: Auditory and Visual  Barriers: None  Comments:    Stages of Change: Maintenance     Psychosocial Plan    Goal Status: Met    Psychosocial Goals: Identify strategies for managing depression and Identify social supports  Learn to use stress management techniques  Improve or maintain quality of life survey      Psychosocial Interventions/Education: To be done in Cardiac Rehab.  Encourage attendance for stress management lecture.  Instruct patient on 3 minute breathing space or mindful breathing technique.  Patient approached about seeing a mental healthcare provider.  2/29/2024 30 Day  Assessment: Mandie was questioned re: any new or increased symptoms of stress, anxiety, or depressions: stress management strategies were discussed. Mandie was encouraged to take time to be mindful daily. Mandie reports she watched movies and plays time to relax. Mandie was encouraged to meet with Ger Pickard the staff psychologist and she will consider.AB  3/28/2024 60 Day Evaluation: Mandie was questioned re: any new or increased symptoms of stress, anxiety, or depression: stress management strategies were discussed. Mandie reports she ignores stress and tries not to let it get to her. Mandie was encouraged to practice mindfulness. Mandie attended the education on Stress and Anxiety (3/11/2024).AB  4/25/2024 90 day evaluation: Mandie was questioned re: any new or increased symptoms of stress, anxiety, or depressions: stress management strategies were discussed. Mandie was encouraged to take time to be mindful everyday. Mandie reports she uses watching movies and reading in the evening as a stress relief technique. Mandie reports having less symptoms of stress and depression now that she can do more and her health has improved.AB  5/6/2024 Discharge: Mandie was able to meet her psychosocial goals by identifying areas of personal stress in her life, and learn stress management strategies. Mandie reports she is taking time to be mindful everyday and uses watching TV, reading, and spending time with family as a stress reduction technique. AB       Nutrition Assessment:    Hyperlipidemia: Yes     Lipids:   Lab Results   Component Value Date    CHOL 184 11/26/2023    HDL 48.5 11/26/2023    TRIG 105 11/26/2023       Current Dietary Guidelines: Low fat, Low sodium  Barriers to dietary change: no    Diet Habit Survey: Fat Blocking Screener Given  Pre: Declined  Post:Declined  2/29/2024 30 Day Assessment: Mandie was reissued a Fat Block Screener her initial was not returned.AB  4/19/2024 Post  "Fat Screener Given.AB  Diabetes Assessment    Lab Results   Component Value Date    HGBA1C 5.5 11/26/2023       History of Diabetes: No    Weight Management:Body mass index is 33.55 kg/m².  5 ft 3 inches  183.60lbs  2/29/2024 30 Day Assessment:   190.4 lbs  3/28/2024 60 Day Evaluation:   188.2 lbs   4/25/2024 90 day evaluation:   187 lbs  5/6/2024 Discharge  189.4 lbs    Nutrition Plan    Goal Status: In progress    Nutrition Goals: Lipid Goal: HDL>45, LDL <70, Total <180, Trigs <150, Adapt a Mediterranean focused diet prior to discharge, and Lose 1lb/week while enrolled in program  Achieve a BMI between 20-25%  Lower BMI to <31% by program completion    Nutrition Interventions/Education:   To be done in Cardiac Rehab.  Encourage attending educational lectures  Booklet given \"Heart Attack-Bouncing Back\"  Encourage patient to follow a heart healthy diet  Encouraged to meet with a  out patient dietician  Lipid profile reviewed   Fat Block Screener Given.  2/29/2024 30 Day Assessment: Mandie reports she is scheduled for fasting lipids next week, but no new lipids were reported. Mandie reports she is eating a heat healthy diet, but her weight is up 6 lbs in the last 30 days since enrolling into cardiac rehab. Mandie attended the education on Healthy Eating on (2/19/2024).AB  3/28/2024 60 Day Evaluation: Mandie reports eating a heart healthy diet no new fasting lipids were reported. Mandie's weight is down 3 lbs in the last 30 days. Mandie attended the education on Cholesterol (3/6/2024).AB  4/25/2024 90 day evaluation: Mandie denies any new fasting lipids and she reports eating a heart healthy diet. Mandie reports trying to eliminate high fat foods from her diet. Mandie attended the education on Healthy Eating (4/22/2024) where proper portion sizes were discussed. AB  5/6/2024 Discharge Mandie continues working towards her Nutritional goals of lowering her LDL<70, reducing her BMI to <33%, and " eating a Mediterranean diet. Mandie plans to continue eliminating high fat foods from her diet and learn to eat smaller portion sizes.AB     Exercise Assessment    No  Mode: NA  Frequency: NA  Duration: NA    Exercise Prescription     Exercise Prescription based on: 6 Minute Walk Test    Pre: 1465 ft / 2.8 mph / 3.1 mets                                                                                        Post:1629 ft / 3.1 mph / 3.6 mets       Frequency:  3 days/week   Mode: Treadmill, Recumbent Cycle, Arm Ergometer, and SciFit   Duration: 40 total aerobic minutes   Intensity: RPE 11-14  Target HR:   103-141  MET Level: 3-6  Patient wears supplemental O2: No     Modality Workload METs Duration (minutes)   1 Pre-Exercise   5 minutes   2 SFS 4.0 4.4 10 :00   3 Treadmill 3.2@2.5% 4.6 10 :00   4 UBE 4.0 4.8 10 :00   5 Recumbent Bike 11 4.5 10 :00   6 Post-Exercise   5 minutes     Resistance Training: Yes   Home Exercise Prescription given: To be given prior to discharge from program.  4/15/2024 Instructed on hand weight use for home with 2 lb weights and patient returned demonstration.   Exercise Plan    Goal Status: Met    Exercise Goals: Increase total exercise duration to 30-45 minutes, Obtain 150 minutes/week of moderate intensity aerobic exercise, and Establish a home exercise program before discharge  Maintain 150 minutes of aerobic exercise per week with Met levels ranging from 3-6 Mets by the end of the program.  Exercising most days of the week for 30 minutes a session.  Muscle toning 2-3 days per week    Exercise Interventions/Education:   To be done in Cardiac Rehab.  Exercise prescription based on 6 MWT  Exercise changes made based on HR an RPE scale in response to exercise  Increase exercise duration by 1-2 minutes per modality for a total of 40 minutes per session  Target goal for duration 40 minutes per session, increase Met levels by 5-10% every 30 days or as tolerated  Encouraged home exercise on  days not at rehab  2/29/2024 30 Day Assessment: Mandie is attending her rehab sessions consistently. On her last session Mandie exercised 40 minutes with Met levels ranging from 2.3-3. Over the next 30 days we will plan to progress her Met levels by 5% on the UBE, TM, and SFS. Mandie was encouraged to start exercising on her own days not at rehab. Mandie attended the Exercise education on (2/12/2024).AB  3/28/2024 60 Day Evaluation: Mandie continues to attend her rehab sessions consistently. On her last session Mandie exercised 40 minutes with Met levels ranging from 3-3.4. Over the next 30 days we will plan to progress her Met levels by 5% on the TM, SFS, and UBE. Mandie reports she is walking most days of the week on her own outside of rehab.AB  4/25/2024 90 day evaluation: Mandie continues to attend her rehab sessions consistently. On her last session Mandie exercised 40 minutes with Met levels ranging from 3.8-4.6. Over the next 30 days we will plan to progress her met levels by 5% on the SFS, UBE, and TM. Mandie was instructed on 2 lb hand weights for home use and she returned demonstration. Mandie was encouraged to exercise days not at rehab.AB  5/6/2024 Discharge Mandie was able to meet her exercise goals by attending her rehab sessions consistently. On her last session Mandie exercised 40 minutes with Met levels ranging from 4.3-4.8. Mandie also had an 11% improvement on her exit 6MWT. Mandie has also joined a local gym and plans to maintain her exercise on her after program completion.AB       Other Core Components/Risk Factor Assessment:    Medication adherence  Current Medications:   Current Outpatient Medications:     acetaminophen (Tylenol) 325 mg tablet, Take 2 tablets (650 mg) by mouth every 4 hours if needed., Disp: , Rfl:     apixaban (Eliquis) 5 mg (74 tabs) tablet, Take 1 tablet (5 mg) by mouth 2 times a day, Disp: 74 tablet, Rfl: 0    aspirin 81 mg chewable tablet,  Chew 1 tablet (81 mg) once daily., Disp: 90 tablet, Rfl: 3    atorvastatin (Lipitor) 40 mg tablet, Take 1 tablet (40 mg) by mouth once daily at bedtime., Disp: 90 tablet, Rfl: 3    FLUoxetine (PROzac) 20 mg capsule, 1 capsule (20 mg) once every 24 hours., Disp: , Rfl:     metoprolol succinate XL (Toprol-XL) 50 mg 24 hr tablet, Take 1.5 tablets (75 mg) by mouth once daily. Do not crush or chew., Disp: 135 tablet, Rfl: 3    nitroglycerin (Nitrostat) 0.4 mg SL tablet, Place 1 tablet (0.4 mg) under the tongue every 5 minutes if needed for chest pain for up to 5 doses., Disp: 5 tablet, Rfl: 3    triamcinolone (Kenalog) 0.1 % cream, APPLY AS NEEDED TWICE DAILY TO FLARES, Disp: , Rfl:                      Medication compliance: Yes   Uses pill box/organizer: Yes    Carries medication list: Yes     Blood Pressure Management  History of Hypertension: No   Medication Changes: No   Resting BP:  115/62       Heart Failure Management  Hx of Heart Failure: No    Smoking/Tobacco Assessment  Social History     Tobacco Use   Smoking Status Never   Smokeless Tobacco Never       Other Core Component Plan    Goal Status: Met    Other Core Component Goals: Medication compliance and Achieve resting BP of < 130/80 by discharge  Encourage patient to be knowledgeable on medication usage and actions by discharge.  Encourage patient to carry an updated medication list      Other Core Component Interventions/Education:   Carries updated medication list.  Check resting blood pressures pre and post exercise  Following a low sodium/2300 mg sodium diet  Medication list provided  Reviewed effects of exercise on blood pressures  2/29/2024 30 Day Assessment: Mandie reports she is knowledgeable of her prescribed medications and compliant with her prescribed medications. Mandie's blood pressures remain at the target of <130/80. Mandie reports she is eating a low sodium diet. Mandie attended the education on Heart Failure  (2/12/2024).AB  3/28/2024 60 Day Evaluation: Mandie reports the addition of Metoprolol 75 mg tab to her prescribed medications. Mandie is knowledgeable of her medications and reports medications compliance. Mandie's blood pressures at target <130/80. Mandie attend the education on Blood Pressure (3/18/2024) and Heart Parts (3/25/2024).AB  4/25/2024 90 day evaluation: Mandie is knowledgeable of her prescribed medications and she reports medication compliance. Mandie maintains blood pressures at the target goal of <130/80. Mandie reports eating a diet that is low in sodium. Mandie reports carrying an updated medication list.AB  5/6/2024 Discharge Mandie was able to meet her Risk Factor goals by being knowledgeable of her prescribed medications and reporting medication compliance. Mandie carries an updated medication list. Mandie also reports eating a low sodium diet. AB      Individual Patient Goals:    To develop a heart healthy exercise routine of 150 minutes per week with Met levels greater than 3 by the end of the program, and be able to return to completing required daily tasks at home without symptoms  To lose 1 lb per week while enrolled in the cardiac rehab program.     Goal Status: Met    Staff Comments:  Reviewed exercise time, Met levels, and RPE scale of heart healthy exercise.AB  2/29/2024 30 Day Assessment: Mandie reports she has occasionally experienced a fluttering feeling in her heart but not while at rehab and is scheduled for a stress echo next week. Mandie remains free from falls since enrolled in the rehab program.AB  3/28/2024 60 Day Evaluation: Mandie reports increased Metoprolol dosages after her echo and reports she is scheduled for a cardiac mri next week. AB   4/25/2024 90 day evaluation: Mandie continues attending her rehab sessions consistently and she was encouraged to start exercising on her own days not at rehab. Mandie remains free from  falls.AB  5/6/2024 Discharge Mandie was able to meet her Individual goals of developing a heart healthy exercise routine of 150 minutes with >3 mets. Mandie continues to work towards her weight loss goal of 1 lb per week by eating heart healthy foods in smaller portion sizes.       Rehab Staff Signature: Elizabeth Talavera RN

## 2024-05-08 ENCOUNTER — APPOINTMENT (OUTPATIENT)
Dept: CARDIAC REHAB | Facility: HOSPITAL | Age: 49
End: 2024-05-08
Payer: COMMERCIAL

## 2024-05-10 ENCOUNTER — APPOINTMENT (OUTPATIENT)
Dept: CARDIAC REHAB | Facility: HOSPITAL | Age: 49
End: 2024-05-10
Payer: COMMERCIAL

## 2024-05-13 ENCOUNTER — APPOINTMENT (OUTPATIENT)
Dept: CARDIAC REHAB | Facility: HOSPITAL | Age: 49
End: 2024-05-13
Payer: COMMERCIAL

## 2024-05-15 ENCOUNTER — APPOINTMENT (OUTPATIENT)
Dept: CARDIAC REHAB | Facility: HOSPITAL | Age: 49
End: 2024-05-15
Payer: COMMERCIAL

## 2024-05-21 ENCOUNTER — OFFICE VISIT (OUTPATIENT)
Dept: CARDIOLOGY | Facility: HOSPITAL | Age: 49
End: 2024-05-21
Payer: COMMERCIAL

## 2024-05-21 VITALS
HEART RATE: 78 BPM | HEIGHT: 63 IN | OXYGEN SATURATION: 96 % | SYSTOLIC BLOOD PRESSURE: 114 MMHG | BODY MASS INDEX: 32.78 KG/M2 | DIASTOLIC BLOOD PRESSURE: 61 MMHG | WEIGHT: 185 LBS

## 2024-05-21 DIAGNOSIS — F41.8 ANXIETY ABOUT HEALTH: ICD-10-CM

## 2024-05-21 DIAGNOSIS — R51.9 FREQUENT HEADACHES: ICD-10-CM

## 2024-05-21 DIAGNOSIS — I25.2 APICAL MYOCARDIAL INFARCTION GREATER THAN 8 WEEKS AGO: Primary | ICD-10-CM

## 2024-05-21 DIAGNOSIS — I25.42 SPONTANEOUS DISSECTION OF CORONARY ARTERY: ICD-10-CM

## 2024-05-21 PROCEDURE — 93010 ELECTROCARDIOGRAM REPORT: CPT | Performed by: INTERNAL MEDICINE

## 2024-05-21 PROCEDURE — 1036F TOBACCO NON-USER: CPT | Performed by: INTERNAL MEDICINE

## 2024-05-21 PROCEDURE — 99214 OFFICE O/P EST MOD 30 MIN: CPT | Performed by: INTERNAL MEDICINE

## 2024-05-21 PROCEDURE — 93005 ELECTROCARDIOGRAM TRACING: CPT | Performed by: INTERNAL MEDICINE

## 2024-05-21 RX ORDER — B2/MAGNESIUM CIT,OXID/FEVERFEW 200-180-50
TABLET ORAL
COMMUNITY

## 2024-05-21 NOTE — PATIENT INSTRUCTIONS
Nice to see you today.  Continue medications as prescribed.  Okay to add an occasional Excedrin for increased headaches.  Ordered Cardiac MRI to be done in July.    See you back for follow up in the fall.    Bibiana Hudson MD  Co-Director, Vascular Center  [facility], [facility]   [facility] Master Clinician in Fibromuscular Dysplasia and Vascular Care  Professor of Medicine  [facility]

## 2024-05-21 NOTE — PROGRESS NOTES
05/21/24  9:17 AM    Vascular Medicine - FMD/Arterial Dissection Program    This 49 y.o. woman is seen in follow-up of LAD SCAD 11.2023.    She had little past medical history -- no hx of migraine  headaches, any prior CV disease, or hypertension. No hx of inflammatory disorders, thyroid disorder, or hyperlipidemia.    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  It progressed and was associated with emesis.  She went to [facility],  Initial HR 79 beats per minute, /85 mm Hg.  CT scan chest/abd/pelvis done to r/o dissection and PE (both not present) and initial hs troponin 121 -- 352. Initial ECG demonstrated  Minor T wave abnormality inferiorly c/w NSTEMI.  She was heparinized and taken for urgent coronary angiography done via right radial approach which found tapering stenosis in the mid/distal LAD c/w type SCAD which was managed conservatively.  Her LV gram showed AK/DK apex with an appearance that could be c/w Takotsubo. Heparin was discontinued and she was on aspirin  + Brilinta, then aspirin + Plavix as well as statin Rx and metoprolol.  Echo prior to discharge on 11.27 with LVEF 55% with report of no RWMA.  Peak hs troponin was 1334.    She was discharged home on 11.27.2023 on DAPT and metoprolol/statin Rx and felt fine initially, doing light errands, but then returned on 11.29 with onset of recurrent chest discomfort awakening her from sleep.  ECG in ER with slight ST elevation in infero lateral leads (< 1 mm). She was readmitted and reheparinized, started on isordil as well as B Blocker. Repeat coronary angiogram done the following day with progression of the LAD SCAD with occlusion distally; this was managed conservatively.  On repeat ECG, this does look like a possible STEMI/near STEMI.  She had a re-rise/reinfarction with troponin at 682 initially (down from discharge troponin 725) peaking at 8570. She was  "discharged home on 12.3.      I have been following Ms. Arevalo since December. We had adjusted her medical Rx and obtained imaging for FMD -- which she does not have.    When I saw her in February, she was having episodes of chest discomfort; exercise echo ordered, ? Of LV apical thrombus on Echo. She was put on Eliquis by Dr. Hermosillo. Cardiac MRI done with no LV thrombus (though typo in final report), and Eliquis was stopped.    Since I saw her last, she finished cardiac rehab. She still has some exertional dyspnea, but her energy/stamina is better overall. She does not have chest pain; her  notices she sometimes rubs her chest -- she says it is when she needs to take a deep breath.  She has some occasional palpitations/\"fluttering\".  She has had some headaches that start at back of neck and work way up; better with Migrelief.    She is on Prozac per her PCP which has helped with anxiety.    No bleeding on aspirin Rx.    She had sleep study; mild sleep apnea. She will work on weight loss.    92-gene arteriopathy panel negative.      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      aspirin 81 mg chewable tablet Chew 1 tablet (81 mg) once daily. 90 tablet 3    atorvastatin (Lipitor) 40 mg " "tablet Take 1 tablet (40 mg) by mouth once daily at bedtime. 90 tablet 3    FLUoxetine (PROzac) 20 mg capsule 1 capsule (20 mg) once every 24 hours.      metoprolol succinate XL (Toprol-XL) 50 mg 24 hr tablet Take 1.5 tablets (75 mg) by mouth once daily. Do not crush or chew. 135 tablet 3    nitroglycerin (Nitrostat) 0.4 mg SL tablet Place 1 tablet (0.4 mg) under the tongue every 5 minutes if needed for chest pain for up to 5 doses. 5 tablet 3    triamcinolone (Kenalog) 0.1 % cream APPLY AS NEEDED TWICE DAILY TO FLARES       No current facility-administered medications for this visit.       Patient Vitals for the past 24 hrs:   BP Pulse SpO2 Height Weight   05/21/24 0854 114/61 -- -- -- --   05/21/24 0848 120/79 78 96 % 1.6 m (5' 3\") 83.9 kg (185 lb)   HEENT benign  JVP flat  No carotid bruits  +S1, S2, RRR, no m/r/g  Clear lungs  Abd benign,  Brisk lower extremity pulses at ankle    92 gene [employer] arteriopathy panel negative    ECG reviewed NSR at 70 beats per minute  T wave inversions across precordium have improved.    Cardiac MRI 4.4.2024  IMPRESSION:  1. No evidence of inducible myocardial ischemia using Regadenoson  stress perfusion imaging.  2. Normal LV size and systolic function. Quantitative LVEF 68 %.  Dyskinesis of true apex well-visualized in the 3 chamber view.  Evidence of LV thrombus.  3. There is evidence of transmural infarction of the distal apical  septum, true apex and distal apical inferior wall (%). LV scar  26%. Distal LAD stenosis  4. Normal RV size and systolic function. Quantitative RVEF51%.  5. Trivial AR (RF 2%), mild MR (RF 26%), and mild qualitative TR     Echo 3.5.2024  CONCLUSIONS:   1. Left ventricular systolic function is mildly decreased.   2. Entire apex is abnormal.   3. Spectral Doppler shows an impaired relaxation pattern of left ventricular diastolic filling.   4. There is a small left ventricular thrombus.   5. Possible apical thrombus - recommend MRI. 12.2023 Echo " done, low normal LVEF, apical RWMA  CONCLUSIONS:   1. Left ventricular systolic function is low normal with a 50-55% estimated ejection fraction.   2. Okay and apical septal segment are abnormal.   3. Spectral Doppler shows an impaired relaxation pattern of left ventricular diastolic filling.    12.2023 CTA head/neck previously reviewed  Right ICA s-curve, tortuosity, left ICA loop, but no classical FMD  No IC aneurysms    Prior catheter angiogram review  I do think here original angiogram shows a mid/distal LAD lesion c/w type II SCAD with APRIL III flow with more severe narrowing of the distal LAD on subsequent angiogram    11.2023 CTA c/a/p   No FMD or visceral/aortic aneurysms    11.29.2023 Echo  LVEF 50-55%  Apical septal, apical anterior, apex, apical inferior RWMA    Assessment/Plan/Recommendations:  49 y.o. woman with no prior cardiac history with initial NSTEMI with LAD lesion c/w type II spontaneous coronary artery dissection; LV gram with a Takotsubo appearance which has been reported in LAD SCAD. She was discharged home then had a new MI event/extension/progression of the LAD lesion. She has recovered from this. She has normal LVEF and apical RWMA.  She has no clear FMD and 92 gene arteriopathy panel better.    We touched on many things today.    From a SCAD management perspective, she is on aspirin alone. I am worried she remains at risk for LV apical thrombus.  I would recommend repeat cardiac MRI at 3 month jose from last MRI.  She will continue metoprolol at present dose and BP well controlled.  Could consider adding ARB/dropping metoprolol dose in future.  Chest pains better and functional capacity better post rehab.    I am glad she is getting some Rx for anxiety; hopefully can get into see a psychologist soon.    I am glad fatigue is better.  Migrelief seems to be helping somewhat for headaches.    She has no underlying FMD or identifiable genetic arteriopathy; plan repeat head to pelvis imaging  5-7 year jose post event.    Overall, I am glad Ms. Arevalo is doing a little better. OK to ride her horse (has never fallen, does not do tricks/jumping).  No lifting > 30#.    RTC fall, 2024; interval follow-up on repeat cardiac MRI.    Bibiana Hudson MD

## 2024-05-21 NOTE — LETTER
May 21, 2024     Sam Rubio MD  [address] [facility] [address]    Patient: Mandie Arevalo   YOB: 1975   Date of Visit: 5/21/2024       Dear Dr. Sam Rubio MD:    Thank you for referring Mandie Arevalo to me for evaluation. Below are my notes for this consultation.  If you have questions, please do not hesitate to call me. I look forward to following your patient along with you.       Sincerely,     Bibiana Hudson MD      CC: Gene Hermosillo MD  ______________________________________________________________________________________    05/21/24  9:17 AM    Vascular Medicine - FMD/Arterial Dissection Program    This 49 y.o. woman is seen in follow-up of LAD SCAD 11.2023.    She had little past medical history -- no hx of migraine  headaches, any prior CV disease, or hypertension. No hx of inflammatory disorders, thyroid disorder, or hyperlipidemia.    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  It progressed and was associated with emesis.  She went to [facility],  Initial HR 79 beats per minute, /85 mm Hg.  CT scan chest/abd/pelvis done to r/o dissection and PE (both not present) and initial hs troponin 121 -- 352. Initial ECG demonstrated  Minor T wave abnormality inferiorly c/w NSTEMI.  She was heparinized and taken for urgent coronary angiography done via right radial approach which found tapering stenosis in the mid/distal LAD c/w type SCAD which was managed conservatively.  Her LV gram showed AK/DK apex with an appearance that could be c/w Takotsubo. Heparin was discontinued and she was on aspirin  + Brilinta, then aspirin + Plavix as well as statin Rx and metoprolol.  Echo prior to discharge on 11.27 with LVEF 55% with report of no RWMA.  Peak hs troponin was 1334.    She was discharged home on 11.27.2023 on DAPT and metoprolol/statin Rx and felt fine initially, doing light  "errands, but then returned on 11.29 with onset of recurrent chest discomfort awakening her from sleep.  ECG in ER with slight ST elevation in infero lateral leads (< 1 mm). She was readmitted and reheparinized, started on isordil as well as B Blocker. Repeat coronary angiogram done the following day with progression of the LAD SCAD with occlusion distally; this was managed conservatively.  On repeat ECG, this does look like a possible STEMI/near STEMI.  She had a re-rise/reinfarction with troponin at 682 initially (down from discharge troponin 725) peaking at 8570. She was discharged home on 12.3.      I have been following Ms. Arevalo since December. We had adjusted her medical Rx and obtained imaging for FMD -- which she does not have.    When I saw her in February, she was having episodes of chest discomfort; exercise echo ordered, ? Of LV apical thrombus on Echo. She was put on Eliquis by Dr. Hermosillo. Cardiac MRI done with no LV thrombus (though typo in final report), and Eliquis was stopped.    Since I saw her last, she finished cardiac rehab. She still has some exertional dyspnea, but her energy/stamina is better overall. She does not have chest pain; her  notices she sometimes rubs her chest -- she says it is when she needs to take a deep breath.  She has some occasional palpitations/\"fluttering\".  She has had some headaches that start at back of neck and work way up; better with Migrelief.    She is on Prozac per her PCP which has helped with anxiety.    No bleeding on aspirin Rx.    She had sleep study; mild sleep apnea. She will work on weight loss.    92-gene arteriopathy panel negative.      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     • aspirin 81 mg chewable tablet Chew 1 tablet (81 mg) once daily. 90 tablet 3   • atorvastatin (Lipitor) 40 mg tablet Take 1 tablet (40 mg) by mouth once daily at bedtime. 90 tablet 3   • FLUoxetine (PROzac) 20 mg capsule 1 capsule (20 mg) once every 24 hours.     • metoprolol succinate XL (Toprol-XL) 50 mg 24 hr tablet Take 1.5 tablets (75 mg) by mouth once daily. Do not crush or chew. 135 tablet 3   • nitroglycerin (Nitrostat) 0.4 mg SL tablet Place 1 tablet (0.4 mg) under the tongue every 5 minutes if needed for chest pain for up to 5 doses. 5 tablet 3   • triamcinolone (Kenalog) 0.1 % cream APPLY AS NEEDED TWICE DAILY TO FLARES       No current facility-administered medications for this visit.       Patient Vitals for the past 24 hrs:   BP Pulse SpO2 Height Weight   05/21/24 0854 114/61 -- -- -- --   05/21/24 0848 120/79 78 96 % 1.6 m (5' 3\") 83.9 kg (185 lb)   HEENT benign  JVP flat  No carotid bruits  +S1, S2, RRR, no m/r/g  Clear lungs  Abd benign,  Brisk lower extremity pulses at ankle    92 gene [employer] arteriopathy panel negative    ECG reviewed NSR at 70 beats per minute  T wave inversions across precordium have improved.    Cardiac MRI 4.4.2024  IMPRESSION:  1. No evidence of inducible myocardial ischemia using Regadenoson  stress perfusion imaging.  2. Normal LV size and systolic function. Quantitative LVEF 68 %.  Dyskinesis of true apex well-visualized in the 3 chamber view.  Evidence of LV thrombus.  3. There is evidence of " transmural infarction of the distal apical  septum, true apex and distal apical inferior wall (%). LV scar  26%. Distal LAD stenosis  4. Normal RV size and systolic function. Quantitative RVEF51%.  5. Trivial AR (RF 2%), mild MR (RF 26%), and mild qualitative TR     Echo 3.5.2024  CONCLUSIONS:   1. Left ventricular systolic function is mildly decreased.   2. Entire apex is abnormal.   3. Spectral Doppler shows an impaired relaxation pattern of left ventricular diastolic filling.   4. There is a small left ventricular thrombus.   5. Possible apical thrombus - recommend MRI.    12.2023 Echo done, low normal LVEF, apical RWMA  CONCLUSIONS:   1. Left ventricular systolic function is low normal with a 50-55% estimated ejection fraction.   2. [place] and apical septal segment are abnormal.   3. Spectral Doppler shows an impaired relaxation pattern of left ventricular diastolic filling.    12.2023 CTA head/neck previously reviewed  Right ICA s-curve, tortuosity, left ICA loop, but no classical FMD  No IC aneurysms    Prior catheter angiogram review  I do think here original angiogram shows a mid/distal LAD lesion c/w type II SCAD with APRIL III flow with more severe narrowing of the distal LAD on subsequent angiogram    11.2023 CTA c/a/p   No FMD or visceral/aortic aneurysms    11.29.2023 Echo  LVEF 50-55%  Apical septal, apical anterior, apex, apical inferior RWMA    Assessment/Plan/Recommendations:  49 y.o. woman with no prior cardiac history with initial NSTEMI with LAD lesion c/w type II spontaneous coronary artery dissection; LV gram with a Takotsubo appearance which has been reported in LAD SCAD. She was discharged home then had a new MI event/extension/progression of the LAD lesion. She has recovered from this. She has normal LVEF and apical RWMA.  She has no clear FMD and 92 gene arteriopathy panel better.    We touched on many things today.    From a SCAD management perspective, she is on aspirin alone. I am  worried she remains at risk for LV apical thrombus.  I would recommend repeat cardiac MRI at 3 month jose from last MRI.  She will continue metoprolol at present dose and BP well controlled.  Could consider adding ARB/dropping metoprolol dose in future.  Chest pains better and functional capacity better post rehab.    I am glad she is getting some Rx for anxiety; hopefully can get into see a psychologist soon.    I am glad fatigue is better.  Migrelief seems to be helping somewhat for headaches.    She has no underlying FMD or identifiable genetic arteriopathy; plan repeat head to pelvis imaging 5-7 year jose post event.    Overall, I am glad Ms. Arevalo is doing a little better. OK to ride her horse (has never fallen, does not do tricks/jumping).  No lifting > 30#.    C fall, 2024; interval follow-up on repeat cardiac MRI.    Bibiana Hudson MD

## 2024-05-30 LAB
ATRIAL RATE: 70 BPM
P AXIS: 60 DEGREES
P OFFSET: 185 MS
P ONSET: 131 MS
PR INTERVAL: 180 MS
Q ONSET: 221 MS
QRS COUNT: 12 BEATS
QRS DURATION: 82 MS
QT INTERVAL: 392 MS
QTC CALCULATION(BAZETT): 423 MS
QTC FREDERICIA: 412 MS
R AXIS: 94 DEGREES
T AXIS: 61 DEGREES
T OFFSET: 417 MS
VENTRICULAR RATE: 70 BPM

## 2024-06-20 ENCOUNTER — TELEPHONE (OUTPATIENT)
Dept: CARDIOLOGY | Facility: CLINIC | Age: 49
End: 2024-06-20
Payer: COMMERCIAL

## 2024-06-20 NOTE — TELEPHONE ENCOUNTER
6/20/24  1442  Called and spoke to patient regarding low blood pressures.    Patient reports she had been doing fine with the metoprolol and had no problems with it. The problems with low blood pressure and LE swelling started after she started taking the Trazodone.    Recommended to patient to get in touch with the provider who prescribed the medication and see if a med change is in order since the issues with blood pressure and swelling started after the addition of trazodone.    Patient verbalized understanding and was agreeable to idea.        ----- Message from Imelda Morrow sent at 6/20/2024 10:56 AM EDT -----  Regarding: Patient phone call.  Pt had a medication change to her metoprolol from 75 to 550 mg added pazadone, maybe trazadone and is now having swelling and light headedness.  Can you call her as she said she has left a message with [facility], Number is   [phone].

## 2024-07-11 ENCOUNTER — HOSPITAL ENCOUNTER (OUTPATIENT)
Dept: RADIOLOGY | Facility: HOSPITAL | Age: 49
Discharge: HOME | End: 2024-07-11
Payer: COMMERCIAL

## 2024-07-11 VITALS — WEIGHT: 185.19 LBS | BODY MASS INDEX: 32.81 KG/M2 | HEIGHT: 63 IN

## 2024-07-11 DIAGNOSIS — I25.42 SPONTANEOUS DISSECTION OF CORONARY ARTERY: ICD-10-CM

## 2024-07-11 DIAGNOSIS — I25.2 APICAL MYOCARDIAL INFARCTION GREATER THAN 8 WEEKS AGO: ICD-10-CM

## 2024-07-11 PROCEDURE — 2550000001 HC RX 255 CONTRASTS: Performed by: INTERNAL MEDICINE

## 2024-07-11 PROCEDURE — A9575 INJ GADOTERATE MEGLUMI 0.1ML: HCPCS | Performed by: INTERNAL MEDICINE

## 2024-07-11 PROCEDURE — 75561 CARDIAC MRI FOR MORPH W/DYE: CPT

## 2024-07-11 RX ORDER — GADOTERATE MEGLUMINE 376.9 MG/ML
33 INJECTION INTRAVENOUS
Status: COMPLETED | OUTPATIENT
Start: 2024-07-11 | End: 2024-07-11

## 2024-07-13 ENCOUNTER — TELEPHONE (OUTPATIENT)
Dept: SURGERY | Facility: CLINIC | Age: 49
End: 2024-07-13
Payer: COMMERCIAL

## 2024-07-13 DIAGNOSIS — I51.3 LV (LEFT VENTRICULAR) MURAL THROMBUS: Primary | ICD-10-CM

## 2024-07-13 NOTE — TELEPHONE ENCOUNTER
07/13/24  10:45 AM    Phone call patient.  Patient has LV apical thrombus. Will begin Xarelto 20 mg/day atop low dose aspirin.  Follow-up with me as scheduled in November.  Plan full 3-6 months Rx.    IMPRESSION:  1. There is a small circular thrombus located at the level of the LV  apex measuring 6 x 4 mm.  2. Normal biventricular size and function. LVEF 66%. RVEF 58%.  3. Prior infarct in the distal LAD territory as signified by near  transmural scar at the level of the LV apex and distal apical septal  wall. Estimated scar thickness of %.  4. There are no findings to suggest an infiltrative or inflammatory  process.  5. Normal aortic, mitral, and tricuspid valve function.         MACRO:  None    Signed by: Uday West 7/11/2024 12:08 PM  Dictation workstation:   [id number]

## 2024-07-30 ENCOUNTER — APPOINTMENT (OUTPATIENT)
Dept: CARDIOLOGY | Facility: CLINIC | Age: 49
End: 2024-07-30
Payer: COMMERCIAL

## 2024-08-16 ENCOUNTER — HOSPITAL ENCOUNTER (OUTPATIENT)
Dept: RADIOLOGY | Facility: HOSPITAL | Age: 49
Discharge: HOME | End: 2024-08-16
Payer: COMMERCIAL

## 2024-08-16 VITALS — HEIGHT: 63 IN | WEIGHT: 183 LBS | BODY MASS INDEX: 32.43 KG/M2

## 2024-08-16 DIAGNOSIS — Z12.31 SCREENING MAMMOGRAM FOR BREAST CANCER: ICD-10-CM

## 2024-08-16 PROCEDURE — 77067 SCR MAMMO BI INCL CAD: CPT

## 2024-08-29 ENCOUNTER — HOSPITAL ENCOUNTER (OUTPATIENT)
Dept: RADIOLOGY | Facility: HOSPITAL | Age: 49
Discharge: HOME | End: 2024-08-29
Payer: COMMERCIAL

## 2024-08-29 ENCOUNTER — TELEPHONE (OUTPATIENT)
Dept: CARDIOLOGY | Facility: HOSPITAL | Age: 49
End: 2024-08-29
Payer: COMMERCIAL

## 2024-08-29 DIAGNOSIS — R92.8 OTHER ABNORMAL AND INCONCLUSIVE FINDINGS ON DIAGNOSTIC IMAGING OF BREAST: ICD-10-CM

## 2024-08-29 DIAGNOSIS — R92.1 CALCIFICATION OF LEFT BREAST ON MAMMOGRAPHY: ICD-10-CM

## 2024-08-29 DIAGNOSIS — R92.8 ABNORMAL FINDINGS ON DIAGNOSTIC IMAGING OF BREAST: Primary | ICD-10-CM

## 2024-08-29 PROCEDURE — 76642 ULTRASOUND BREAST LIMITED: CPT | Mod: RT

## 2024-08-29 PROCEDURE — 77065 DX MAMMO INCL CAD UNI: CPT | Mod: LT

## 2024-08-29 PROCEDURE — 76982 USE 1ST TARGET LESION: CPT | Mod: RT

## 2024-08-29 NOTE — TELEPHONE ENCOUNTER
"Received message from patient concerning biopsy on breast and blood thinner  \"i am having a stereotactic breast biopsy on Friday, September 6 at [facility]. The radiologist nurse thought it would be alright to continue with the blood thinners, however I wanted to reach out to let you know what was going on and if you were ok with continuing with the blood thinners or if I should come off of them until this is complete. Thanks so much.\"    I spoke with Dr. Hermosillo and he stated that he has no concern and she can continue her medication, called Mandie and let her know.     EP  "

## 2024-08-29 NOTE — NURSING NOTE
After patient review of diagnostic results with Dr. Jensen, support provided. Written literature regarding abnormal breast imaging and breast biopsy including what to expect before, during, and after the procedure reviewed with the patient. Additional literature regarding Fast MRI for dense breast provided to patient to review before provider consultation at her request. All questions answered. Patient selected Dr. Akers for surgical consultation 9/3 1:45PM with biopsy to follow 9/6 8:00AM. Information provided and reviewed to include provider information, directions, and how to reach me directly with questions or concerns before concluding visit.  Reviewed patient Hx with Dr. Jensen: MI, thrombus on anticoagulant (Xarelto 20mg/d) following with Cardiology. Okay to remain on anticoagulant for stereotactic biopsy per our conversation to minimize risk to patient.

## 2024-08-29 NOTE — PROGRESS NOTES
Patient follow up scheduling:  left breast stereotactic biopsy per provider recommendation, 9/6 8:00AM.  Genoveva @ [facility] office notified regarding patient follow up scheduling.

## 2024-09-06 ENCOUNTER — HOSPITAL ENCOUNTER (OUTPATIENT)
Dept: RADIOLOGY | Facility: HOSPITAL | Age: 49
Discharge: HOME | End: 2024-09-06
Payer: COMMERCIAL

## 2024-09-06 VITALS
RESPIRATION RATE: 16 BRPM | HEART RATE: 94 BPM | SYSTOLIC BLOOD PRESSURE: 133 MMHG | OXYGEN SATURATION: 100 % | DIASTOLIC BLOOD PRESSURE: 83 MMHG

## 2024-09-06 DIAGNOSIS — R92.8 ABNORMAL FINDINGS ON DIAGNOSTIC IMAGING OF BREAST: ICD-10-CM

## 2024-09-06 DIAGNOSIS — R92.1 CALCIFICATION OF LEFT BREAST ON MAMMOGRAPHY: ICD-10-CM

## 2024-09-06 PROCEDURE — 19081 BX BREAST 1ST LESION STRTCTC: CPT | Mod: LT

## 2024-09-06 PROCEDURE — 77065 DX MAMMO INCL CAD UNI: CPT | Mod: LT

## 2024-09-06 PROCEDURE — 2720000007 HC OR 272 NO HCPCS

## 2024-09-06 ASSESSMENT — PAIN - FUNCTIONAL ASSESSMENT: PAIN_FUNCTIONAL_ASSESSMENT: 0-10

## 2024-09-06 NOTE — DISCHARGE INSTRUCTIONS
AFTER THE TEST  A bandage will be placed over the incision. You may shower after 24 hours, however avoid swimming or soaking in tub for 3 days. Remove bandage after the shower or if it becomes soiled. Keep the puncture site clean/dry until resurfaced.     You may have mild discomfort at the test site. If needed, you may take Tylenol (Acetaminophen) for pain. Please avoid taking NSAIDs, Motrin, Advil, Aleve, or ibuprofen for 24 hours following the biopsy. After 24 hours you may resume NSAIDSs.      If you take aspirin, Plavix, Coumadin, Xarelto or Eliquis please tell us. If these medications were stopped by your provider, please ask them when to resume.      You may have some tenderness, bruising or slight bleeding at the site. Please apply ice packs to the site for 15 minutes on and 15 minutes off for a 2 hour minimum.      Most people can return to their usual routine after the procedure. Avoid Strenuous activity for 24 hours.      Sleep in a supportive bra the night after your biopsy. Continue to do so for comfort.      Call your provider if you have any of the following symptoms :  Fever  Increased pain  Increased bleeding  Redness  Increased swelling  Yellowish drainage    Your provider will get the biopsy results, usually within 5 - 7 days. Call your provider with any questions.

## 2024-09-06 NOTE — NURSING NOTE
Patient demonstrated alert and calm prior to biopsy. Interventions including therapeutic nursing support provided throughout procedure with good response. Patient completed procedure without difficulty. Hemostasis achieved at left breast puncture site with manual pressure. DSD applied. Patient verbalized understanding discharge instructions and has written copy for review. Assisted by technician for post-procedure clip imaging. Ambulates independently without difficulty to Mammography waiting area.

## 2024-09-11 LAB
LAB AP ASR DISCLAIMER: NORMAL
LABORATORY COMMENT REPORT: NORMAL
PATH REPORT.COMMENTS IMP SPEC: NORMAL
PATH REPORT.FINAL DX SPEC: NORMAL
PATH REPORT.GROSS SPEC: NORMAL
PATH REPORT.RELEVANT HX SPEC: NORMAL
PATH REPORT.TOTAL CANCER: NORMAL

## 2024-10-03 ENCOUNTER — TELEPHONE (OUTPATIENT)
Dept: RADIOLOGY | Facility: HOSPITAL | Age: 49
End: 2024-10-03
Payer: COMMERCIAL

## 2024-10-03 NOTE — TELEPHONE ENCOUNTER
Patient contacted this RN Breast Care Coordinator, states she will see Dr. Vitale for 2nd opinion consultation and would like to be sure records will transfer. Upon chart review this RN notes authorization to release PHI is documented. Will follow up with clerical to advise patient request.

## 2024-10-29 ENCOUNTER — APPOINTMENT (OUTPATIENT)
Dept: CARDIOLOGY | Facility: CLINIC | Age: 49
End: 2024-10-29
Payer: COMMERCIAL

## 2024-10-29 VITALS
WEIGHT: 183 LBS | BODY MASS INDEX: 32.43 KG/M2 | HEART RATE: 73 BPM | HEIGHT: 63 IN | SYSTOLIC BLOOD PRESSURE: 126 MMHG | DIASTOLIC BLOOD PRESSURE: 78 MMHG

## 2024-10-29 DIAGNOSIS — E78.2 MIXED HYPERLIPIDEMIA: ICD-10-CM

## 2024-10-29 DIAGNOSIS — I25.42 CORONARY ARTERY DISSECTION: Primary | ICD-10-CM

## 2024-10-29 DIAGNOSIS — I25.42 SPONTANEOUS DISSECTION OF CORONARY ARTERY: ICD-10-CM

## 2024-10-29 DIAGNOSIS — I51.3 LV (LEFT VENTRICULAR) MURAL THROMBUS: ICD-10-CM

## 2024-10-29 PROCEDURE — 3008F BODY MASS INDEX DOCD: CPT | Performed by: STUDENT IN AN ORGANIZED HEALTH CARE EDUCATION/TRAINING PROGRAM

## 2024-10-29 PROCEDURE — 93000 ELECTROCARDIOGRAM COMPLETE: CPT | Performed by: STUDENT IN AN ORGANIZED HEALTH CARE EDUCATION/TRAINING PROGRAM

## 2024-10-29 PROCEDURE — 99215 OFFICE O/P EST HI 40 MIN: CPT | Performed by: STUDENT IN AN ORGANIZED HEALTH CARE EDUCATION/TRAINING PROGRAM

## 2024-10-29 ASSESSMENT — ENCOUNTER SYMPTOMS
OCCASIONAL FEELINGS OF UNSTEADINESS: 0
DEPRESSION: 0
LOSS OF SENSATION IN FEET: 0

## 2024-11-12 ENCOUNTER — APPOINTMENT (OUTPATIENT)
Dept: CARDIOLOGY | Facility: HOSPITAL | Age: 49
End: 2024-11-12
Payer: COMMERCIAL

## 2024-11-12 VITALS
BODY MASS INDEX: 33.48 KG/M2 | WEIGHT: 189 LBS | DIASTOLIC BLOOD PRESSURE: 74 MMHG | HEART RATE: 62 BPM | SYSTOLIC BLOOD PRESSURE: 111 MMHG

## 2024-11-12 DIAGNOSIS — I25.42 SPONTANEOUS DISSECTION OF CORONARY ARTERY: ICD-10-CM

## 2024-11-12 DIAGNOSIS — I21.4 NON-ST ELEVATION MYOCARDIAL INFARCTION (NSTEMI) (MULTI): Primary | ICD-10-CM

## 2024-11-12 DIAGNOSIS — I51.3 LEFT VENTRICULAR APICAL THROMBUS: ICD-10-CM

## 2024-11-12 LAB
ATRIAL RATE: 62 BPM
P AXIS: 48 DEGREES
P OFFSET: 190 MS
P ONSET: 133 MS
PR INTERVAL: 178 MS
Q ONSET: 222 MS
QRS COUNT: 10 BEATS
QRS DURATION: 80 MS
QT INTERVAL: 422 MS
QTC CALCULATION(BAZETT): 428 MS
QTC FREDERICIA: 426 MS
R AXIS: 55 DEGREES
T AXIS: 47 DEGREES
T OFFSET: 433 MS
VENTRICULAR RATE: 62 BPM

## 2024-11-12 PROCEDURE — 93005 ELECTROCARDIOGRAM TRACING: CPT | Performed by: INTERNAL MEDICINE

## 2024-11-12 PROCEDURE — 1036F TOBACCO NON-USER: CPT | Performed by: INTERNAL MEDICINE

## 2024-11-12 PROCEDURE — 99214 OFFICE O/P EST MOD 30 MIN: CPT | Performed by: INTERNAL MEDICINE

## 2024-11-12 NOTE — LETTER
November 12, 2024     Sam Rubio MD  [address] [facility] [address]    Patient: Mandie Arevalo   YOB: 1975   Date of Visit: 11/12/2024       Dear Dr. Sam Rubio MD:    Thank you for referring Mandie Arevalo to me for evaluation. Below are my notes for this consultation.  If you have questions, please do not hesitate to call me. I look forward to following your patient along with you.       Sincerely,     Bibiana Hudson MD      CC: Gene Hermosillo MD  ______________________________________________________________________________________    11/12/24  9:18 AM    Vascular Medicine - FMD/Arterial Dissection Program    This 49 y.o. woman is seen in follow-up of LAD SCAD 11.2023 with extension/progression, anterior MI, subsequent LV apical thrombus.    She had little past medical history -- no hx of migraine  headaches, any prior CV disease, or hypertension. No hx of inflammatory disorders, thyroid disorder, or hyperlipidemia.    On 11.25.2023 she presented to the emergency room with chest discomfort that began while preparing her developmentally disabled son's lunch.  It progressed and was associated with emesis.  She went to [place] ER,  Initial HR 79 beats per minute, /85 mm Hg.  CT scan chest/abd/pelvis done to r/o dissection and PE (both not present) and initial hs troponin 121 -- 352. Initial ECG demonstrated  Minor T wave abnormality inferiorly c/w NSTEMI.  She was heparinized and taken for urgent coronary angiography done via right radial approach which found tapering stenosis in the mid/distal LAD c/w type SCAD which was managed conservatively.  Her LV gram showed AK/DK apex with an appearance that could be c/w Takotsubo. Heparin was discontinued and she was on aspirin  + Brilinta, then aspirin + Plavix as well as statin Rx and metoprolol.  Echo prior to discharge on 11.27 with LVEF 55% with report of no RWMA.  Peak hs troponin was 1334. She was discharged home on  11.27.2023 on DAPT and metoprolol/statin Rx and felt fine initially, doing light errands, but then returned on 11.29 with onset of recurrent chest discomfort awakening her from sleep.  ECG in ER with slight ST elevation in infero lateral leads (< 1 mm). She was readmitted and reheparinized, started on isordil as well as B Blocker. Repeat coronary angiogram done the following day with progression of the LAD SCAD with occlusion distally; this was managed conservatively.  On repeat ECG, this does look like a possible STEMI/near STEMI.  She had a re-rise/reinfarction with troponin at 682 initially (down from discharge troponin 725) peaking at 8570. She was discharged home on 12.3.      She subsequent was found to have LV apical thrombus.  Was on Eliquis, then it was stopped; then repeat cardiac MRI with persistent thrombus. She has been on Xarelto since July.  She has completed cardiac rehab.    Since I saw her last, she is doing much better. No hospitalizations, no ER visits. No angina. No dyspnea. No palpitations.  No fluid retention. BP good and energy level good. She is on Xarelto +aspirin.       She has no FMD on below neck imaging and prior 92-gene arteriopathy panel negative.      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     •  aspirin 81 mg chewable tablet Chew 1 tablet (81 mg) once daily. 90 tablet 3   • atorvastatin (Lipitor) 40 mg tablet Take 1 tablet (40 mg) by mouth once daily at bedtime. 90 tablet 3   • B2-magnesium cit,oxid-feverfew (MigreLief) 200-180-50 mg tablet Take by mouth.     • FLUoxetine (PROzac) 20 mg capsule 1 capsule (20 mg) once every 24 hours.     • nitroglycerin (Nitrostat) 0.4 mg SL tablet Place 1 tablet (0.4 mg) under the tongue every 5 minutes if needed for chest pain for up to 5 doses. 5 tablet 3   • rivaroxaban (Xarelto) 20 mg tablet Take 1 tablet (20 mg) by mouth once daily in the evening. Take with meals. Take with food. 90 tablet 2   • metoprolol succinate XL (Toprol-XL) 50 mg 24 hr tablet Take 1.5 tablets (75 mg) by mouth once daily. Do not crush or chew. 135 tablet 3     No current facility-administered medications for this visit.       Physical Exam  Patient Vitals for the past 24 hrs:   BP Pulse Weight   11/12/24 0842 115/74 62 85.7 kg (189 lb)   Left arm 111/74 mm Hg  HEENT benign  JVP flat  +S1, S2, RRR, no m/r/g  Clear lungs  Abd benign  No edema    92 gene [employer] arteriopathy panel negative    ECG reviewed NSR at 62 beats per minute  T wave inversions across precordium have improved now normal, no ischemic changes    7.2024 Cardiac MRI  IMPRESSION:  1. There is a small circular thrombus located at the level of the LV  apex measuring 6 x 4 mm.  2. Normal biventricular size and function. LVEF 66%. RVEF 58%.  3. Prior infarct in the distal LAD territory as signified by near  transmural scar at the level of the LV apex and distal apical septal  wall. Estimated scar thickness of %.  4. There are no findings to suggest an infiltrative or inflammatory  process.  5. Normal aortic, mitral, and tricuspid valve function.         Cardiac MRI 4.4.2024  IMPRESSION:  1. No evidence of inducible myocardial ischemia using Regadenoson  stress perfusion imaging.  2. Normal LV size and systolic function.  Quantitative LVEF 68 %.  Dyskinesis of true apex well-visualized in the 3 chamber view.  Evidence of LV thrombus.  3. There is evidence of transmural infarction of the distal apical  septum, true apex and distal apical inferior wall (%). LV scar  26%. Distal LAD stenosis  4. Normal RV size and systolic function. Quantitative RVEF51%.  5. Trivial AR (RF 2%), mild MR (RF 26%), and mild qualitative TR     Echo 3.5.2024  CONCLUSIONS:   1. Left ventricular systolic function is mildly decreased.   2. Entire apex is abnormal.   3. Spectral Doppler shows an impaired relaxation pattern of left ventricular diastolic filling.   4. There is a small left ventricular thrombus.   5. Possible apical thrombus - recommend MRI.    12.2023 Echo done, low normal LVEF, apical RWMA  CONCLUSIONS:   1. Left ventricular systolic function is low normal with a 50-55% estimated ejection fraction.   2. [place] and apical septal segment are abnormal.   3. Spectral Doppler shows an impaired relaxation pattern of left ventricular diastolic filling.    12.2023 CTA head/neck previously reviewed  Right ICA s-curve, tortuosity, left ICA loop, but no classical FMD  No IC aneurysms    Prior catheter angiogram review  I do think here original angiogram shows a mid/distal LAD lesion c/w type II SCAD with APRIL III flow with more severe narrowing of the distal LAD on subsequent angiogram    11.2023 CTA c/a/p   No FMD or visceral/aortic aneurysms    11.29.2023 Echo  LVEF 50-55%  Apical septal, apical anterior, apex, apical inferior RWMA    Assessment/Plan/Recommendations:  49 y.o. 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 She was discharged home then had a new MI event/extension/progression of the LAD lesion. She has recovered from this. She has normal LVEF and apical RWMA and developed  LV apical thrombus.  She has no clear FMD and 92 gene arteriopathy panel better.    We touched on many things today.    From a SCAD management perspective, she is doing much better with no symptoms. She has been on aspirin + Xarelto for ~ 4months since last cardiac MRI. WE will repeat. We discussed stopping Xarelto if thrombus gone, switching to alternative agent if no change. If small post thrombotic residual,will need to determine next steps.     She will continue B Blocker, aspirin, statin Rx. BP good today.    She has no underlying FMD or identifiable genetic arteriopathy; plan repeat head to pelvis imaging 5-7 year jose post event.    Overall, I am glad Ms. Arevalo is doing much better.   OK to ride her horse (has never fallen, does not do tricks/jumping).  No lifting > 35# routinely, 50# at any time working with them.    RTC 6 months ; interval follow-up on repeat cardiac MRI.    Bibiana Hudson MD

## 2024-11-12 NOTE — PROGRESS NOTES
11/12/24  9:18 AM    Vascular Medicine - FMD/Arterial Dissection Program    This 49 y.o. woman is seen in follow-up of LAD SCAD 11.2023 with extension/progression, anterior MI, subsequent LV apical thrombus.    She had little past medical history -- no hx of migraine  headaches, any prior CV disease, or hypertension. No hx of inflammatory disorders, thyroid disorder, or hyperlipidemia.    On 11.25.2023 she presented to the emergency room with chest discomfort that began while preparing her developmentally disabled son's lunch.  It progressed and was associated with emesis.  She went to [facility] ER,  Initial HR 79 beats per minute, /85 mm Hg.  CT scan chest/abd/pelvis done to r/o dissection and PE (both not present) and initial hs troponin 121 -- 352. Initial ECG demonstrated  Minor T wave abnormality inferiorly c/w NSTEMI.  She was heparinized and taken for urgent coronary angiography done via right radial approach which found tapering stenosis in the mid/distal LAD c/w type SCAD which was managed conservatively.  Her LV gram showed AK/DK apex with an appearance that could be c/w Takotsubo. Heparin was discontinued and she was on aspirin  + Brilinta, then aspirin + Plavix as well as statin Rx and metoprolol.  Echo prior to discharge on 11.27 with LVEF 55% with report of no RWMA.  Peak hs troponin was 1334. She was discharged home on 11.27.2023 on DAPT and metoprolol/statin Rx and felt fine initially, doing light errands, but then returned on 11.29 with onset of recurrent chest discomfort awakening her from sleep.  ECG in ER with slight ST elevation in infero lateral leads (< 1 mm). She was readmitted and reheparinized, started on isordil as well as B Blocker. Repeat coronary angiogram done the following day with progression of the LAD SCAD with occlusion distally; this was managed conservatively.  On repeat ECG, this does look like a possible STEMI/near STEMI.  She had a re-rise/reinfarction with troponin at  682 initially (down from discharge troponin 725) peaking at 8570. She was discharged home on 12.3.      She subsequent was found to have LV apical thrombus.  Was on Eliquis, then it was stopped; then repeat cardiac MRI with persistent thrombus. She has been on Xarelto since July.  She has completed cardiac rehab.    Since I saw her last, she is doing much better. No hospitalizations, no ER visits. No angina. No dyspnea. No palpitations.  No fluid retention. BP good and energy level good. She is on Xarelto +aspirin.       She has no FMD on below neck imaging and prior 92-gene arteriopathy panel negative.      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      aspirin 81 mg chewable tablet Chew 1 tablet (81 mg) once daily. 90 tablet 3    atorvastatin (Lipitor) 40 mg tablet Take 1 tablet (40 mg) by mouth once daily at bedtime. 90 tablet 3    B2-magnesium cit,oxid-feverfew (MigreLief) 200-180-50 mg tablet Take by mouth.      FLUoxetine (PROzac) 20 mg capsule 1 capsule (20 mg) once every 24 hours.      nitroglycerin (Nitrostat) 0.4 mg SL tablet Place 1 tablet (0.4 mg) under the tongue every 5 minutes if needed for chest pain for up to 5 doses. 5 tablet 3    rivaroxaban (Xarelto) 20 mg tablet Take 1 tablet (20 mg) by mouth once daily in the  evening. Take with meals. Take with food. 90 tablet 2    metoprolol succinate XL (Toprol-XL) 50 mg 24 hr tablet Take 1.5 tablets (75 mg) by mouth once daily. Do not crush or chew. 135 tablet 3     No current facility-administered medications for this visit.       Physical Exam  Patient Vitals for the past 24 hrs:   BP Pulse Weight   11/12/24 0842 115/74 62 85.7 kg (189 lb)   Left arm 111/74 mm Hg  HEENT benign  JVP flat  +S1, S2, RRR, no m/r/g  Clear lungs  Abd benign  No edema    92 gene [employer] arteriopathy panel negative    ECG reviewed NSR at 62 beats per minute  T wave inversions across precordium have improved now normal, no ischemic changes    7.2024 Cardiac MRI  IMPRESSION:  1. There is a small circular thrombus located at the level of the LV  apex measuring 6 x 4 mm.  2. Normal biventricular size and function. LVEF 66%. RVEF 58%.  3. Prior infarct in the distal LAD territory as signified by near  transmural scar at the level of the LV apex and distal apical septal  wall. Estimated scar thickness of %.  4. There are no findings to suggest an infiltrative or inflammatory  process.  5. Normal aortic, mitral, and tricuspid valve function.         Cardiac MRI 4.4.2024  IMPRESSION:  1. No evidence of inducible myocardial ischemia using Regadenoson  stress perfusion imaging.  2. Normal LV size and systolic function. Quantitative LVEF 68 %.  Dyskinesis of true apex well-visualized in the 3 chamber view.  Evidence of LV thrombus.  3. There is evidence of transmural infarction of the distal apical  septum, true apex and distal apical inferior wall (%). LV scar  26%. Distal LAD stenosis  4. Normal RV size and systolic function. Quantitative RVEF51%.  5. Trivial AR (RF 2%), mild MR (RF 26%), and mild qualitative TR     Echo 3.5.2024  CONCLUSIONS:   1. Left ventricular systolic function is mildly decreased.   2. Entire apex is abnormal.   3. Spectral Doppler shows an impaired relaxation pattern of left  ventricular diastolic filling.   4. There is a small left ventricular thrombus.   5. Possible apical thrombus - recommend MRI.    12.2023 Echo done, low normal LVEF, apical RWMA  CONCLUSIONS:   1. Left ventricular systolic function is low normal with a 50-55% estimated ejection fraction.   2. [place] and apical septal segment are abnormal.   3. Spectral Doppler shows an impaired relaxation pattern of left ventricular diastolic filling.    12.2023 CTA head/neck previously reviewed  Right ICA s-curve, tortuosity, left ICA loop, but no classical FMD  No IC aneurysms    Prior catheter angiogram review  I do think here original angiogram shows a mid/distal LAD lesion c/w type II SCAD with APRIL III flow with more severe narrowing of the distal LAD on subsequent angiogram    11.2023 CTA c/a/p   No FMD or visceral/aortic aneurysms    11.29.2023 Echo  LVEF 50-55%  Apical septal, apical anterior, apex, apical inferior RWMA    Assessment/Plan/Recommendations:  49 y.o. 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 She was discharged home then had a new MI event/extension/progression of the LAD lesion. She has recovered from this. She has normal LVEF and apical RWMA and developed LV apical thrombus.  She has no clear FMD and 92 gene arteriopathy panel better.    We touched on many things today.    From a SCAD management perspective, she is doing much better with no symptoms. She has been on aspirin + Xarelto for ~ 4months since last cardiac MRI. WE will repeat. We discussed stopping Xarelto if thrombus gone, switching to alternative agent if no change. If small post thrombotic residual,will need to determine next steps.     She will continue B Blocker, aspirin, statin Rx. BP good today.    She has no underlying FMD or identifiable genetic arteriopathy; plan repeat  head to pelvis imaging 5-7 year jose post event.    Overall, I am glad Ms. Arevalo is doing much better.   OK to ride her horse (has never fallen, does not do tricks/jumping).  No lifting > 35# routinely, 50# at any time working with them.    RTC 6 months ; interval follow-up on repeat cardiac MRI.    Bibiana Hudson MD

## 2024-11-12 NOTE — PATIENT INSTRUCTIONS
Continue medications as prescribed for now.  Ordered MRI cardiac - we will reach out with results.    See you back in 6 months for clinical check in.    Bibiana Hudson MD  Co-Director, Vascular Center  [facility], [facility]   [facility] Master Clinician in Fibromuscular Dysplasia and Vascular Care  Professor of Medicine  [facility]

## 2025-01-16 ENCOUNTER — HOSPITAL ENCOUNTER (OUTPATIENT)
Dept: RADIOLOGY | Facility: HOSPITAL | Age: 50
Discharge: HOME | End: 2025-01-16
Payer: COMMERCIAL

## 2025-01-16 DIAGNOSIS — I25.42 SPONTANEOUS DISSECTION OF CORONARY ARTERY: ICD-10-CM

## 2025-01-16 DIAGNOSIS — I51.3 LEFT VENTRICULAR APICAL THROMBUS: ICD-10-CM

## 2025-01-16 PROCEDURE — A9575 INJ GADOTERATE MEGLUMI 0.1ML: HCPCS | Performed by: INTERNAL MEDICINE

## 2025-01-16 PROCEDURE — 2550000001 HC RX 255 CONTRASTS: Performed by: INTERNAL MEDICINE

## 2025-01-16 PROCEDURE — 75561 CARDIAC MRI FOR MORPH W/DYE: CPT | Performed by: RADIOLOGY

## 2025-01-16 PROCEDURE — 75561 CARDIAC MRI FOR MORPH W/DYE: CPT

## 2025-01-16 RX ORDER — GADOTERATE MEGLUMINE 376.9 MG/ML
25 INJECTION INTRAVENOUS
Status: COMPLETED | OUTPATIENT
Start: 2025-01-16 | End: 2025-01-16

## 2025-01-16 RX ADMIN — GADOTERATE MEGLUMINE 25 ML: 376.9 INJECTION INTRAVENOUS at 13:22

## 2025-01-26 DIAGNOSIS — I21.4 NSTEMI (NON-ST ELEVATED MYOCARDIAL INFARCTION) (MULTI): ICD-10-CM

## 2025-01-30 DIAGNOSIS — I51.3 LEFT VENTRICULAR APICAL THROMBUS: ICD-10-CM

## 2025-01-30 DIAGNOSIS — E78.2 MIXED HYPERLIPIDEMIA: Primary | ICD-10-CM

## 2025-01-30 DIAGNOSIS — I51.3 LV (LEFT VENTRICULAR) MURAL THROMBUS: ICD-10-CM

## 2025-01-31 RX ORDER — NAPROXEN SODIUM 220 MG/1
81 TABLET, FILM COATED ORAL DAILY
Qty: 90 TABLET | Refills: 0 | Status: SHIPPED | OUTPATIENT
Start: 2025-01-31

## 2025-01-31 RX ORDER — ATORVASTATIN CALCIUM 40 MG/1
40 TABLET, FILM COATED ORAL NIGHTLY
Qty: 90 TABLET | Refills: 0 | Status: SHIPPED | OUTPATIENT
Start: 2025-01-31

## 2025-04-29 DIAGNOSIS — I21.4 NSTEMI (NON-ST ELEVATED MYOCARDIAL INFARCTION) (MULTI): ICD-10-CM

## 2025-05-01 ENCOUNTER — OFFICE VISIT (OUTPATIENT)
Dept: CARDIOLOGY | Facility: HOSPITAL | Age: 50
End: 2025-05-01
Payer: COMMERCIAL

## 2025-05-01 VITALS
HEIGHT: 63 IN | BODY MASS INDEX: 35.79 KG/M2 | HEART RATE: 69 BPM | WEIGHT: 202 LBS | OXYGEN SATURATION: 97 % | SYSTOLIC BLOOD PRESSURE: 122 MMHG | DIASTOLIC BLOOD PRESSURE: 84 MMHG

## 2025-05-01 DIAGNOSIS — I51.3 LEFT VENTRICULAR APICAL THROMBUS: ICD-10-CM

## 2025-05-01 DIAGNOSIS — I21.4 NSTEMI (NON-ST ELEVATED MYOCARDIAL INFARCTION) (MULTI): ICD-10-CM

## 2025-05-01 DIAGNOSIS — I51.89 MILD LEFT VENTRICULAR SYSTOLIC DYSFUNCTION: ICD-10-CM

## 2025-05-01 DIAGNOSIS — I25.42 SPONTANEOUS DISSECTION OF CORONARY ARTERY: Primary | ICD-10-CM

## 2025-05-01 LAB
BUN SERPL-MCNC: 12 MG/DL (ref 7–25)
BUN/CREAT SERPL: NORMAL (CALC) (ref 6–22)
CALCIUM SERPL-MCNC: 8.7 MG/DL (ref 8.6–10.4)
CHLORIDE SERPL-SCNC: 105 MMOL/L (ref 98–110)
CO2 SERPL-SCNC: 26 MMOL/L (ref 20–32)
CREAT SERPL-MCNC: 0.76 MG/DL (ref 0.5–1.03)
EGFRCR SERPLBLD CKD-EPI 2021: 95 ML/MIN/1.73M2
GLUCOSE SERPL-MCNC: 87 MG/DL (ref 65–99)
POTASSIUM SERPL-SCNC: 4.5 MMOL/L (ref 3.5–5.3)
SODIUM SERPL-SCNC: 138 MMOL/L (ref 135–146)

## 2025-05-01 PROCEDURE — 1036F TOBACCO NON-USER: CPT | Performed by: PHYSICIAN ASSISTANT

## 2025-05-01 PROCEDURE — 99213 OFFICE O/P EST LOW 20 MIN: CPT | Performed by: PHYSICIAN ASSISTANT

## 2025-05-01 PROCEDURE — 3008F BODY MASS INDEX DOCD: CPT | Performed by: PHYSICIAN ASSISTANT

## 2025-05-01 RX ORDER — ATORVASTATIN CALCIUM 40 MG/1
40 TABLET, FILM COATED ORAL NIGHTLY
Qty: 90 TABLET | Refills: 3 | Status: SHIPPED | OUTPATIENT
Start: 2025-05-01 | End: 2026-05-01

## 2025-05-01 RX ORDER — METOPROLOL SUCCINATE 50 MG/1
50 TABLET, EXTENDED RELEASE ORAL DAILY
COMMUNITY
End: 2025-05-01 | Stop reason: SDUPTHER

## 2025-05-01 RX ORDER — METOPROLOL SUCCINATE 50 MG/1
50 TABLET, EXTENDED RELEASE ORAL DAILY
Qty: 90 TABLET | Refills: 3 | Status: SHIPPED | OUTPATIENT
Start: 2025-05-01 | End: 2026-05-01

## 2025-05-01 ASSESSMENT — ENCOUNTER SYMPTOMS
WEAKNESS: 0
FEVER: 0
WEIGHT GAIN: 1
VOMITING: 0
ABDOMINAL PAIN: 0
PALPITATIONS: 0
ORTHOPNEA: 0
SHORTNESS OF BREATH: 0
WHEEZING: 0
DIARRHEA: 0
DYSURIA: 0
NAUSEA: 0

## 2025-05-01 NOTE — PATIENT INSTRUCTIONS
We will arrange for referral to dietician.  Please get labs so we can check your thyroid for the wt gain.  Please keep follow with Dr. Hudson in May and then Dr. Hermosillo in October.

## 2025-05-07 DIAGNOSIS — D50.9 IRON DEFICIENCY ANEMIA, UNSPECIFIED IRON DEFICIENCY ANEMIA TYPE: ICD-10-CM

## 2025-05-07 RX ORDER — NAPROXEN SODIUM 220 MG/1
81 TABLET, FILM COATED ORAL DAILY
Qty: 90 TABLET | Refills: 0 | OUTPATIENT
Start: 2025-05-07

## 2025-05-07 RX ORDER — ATORVASTATIN CALCIUM 40 MG/1
40 TABLET, FILM COATED ORAL NIGHTLY
Qty: 90 TABLET | Refills: 0 | OUTPATIENT
Start: 2025-05-07

## 2025-05-12 ENCOUNTER — OFFICE VISIT (OUTPATIENT)
Dept: CARDIOLOGY | Facility: HOSPITAL | Age: 50
End: 2025-05-12
Payer: COMMERCIAL

## 2025-05-12 ENCOUNTER — LAB (OUTPATIENT)
Dept: LAB | Facility: HOSPITAL | Age: 50
End: 2025-05-12
Payer: COMMERCIAL

## 2025-05-12 VITALS
OXYGEN SATURATION: 97 % | DIASTOLIC BLOOD PRESSURE: 88 MMHG | HEART RATE: 75 BPM | SYSTOLIC BLOOD PRESSURE: 149 MMHG | BODY MASS INDEX: 35.19 KG/M2 | RESPIRATION RATE: 18 BRPM | HEIGHT: 63 IN | WEIGHT: 198.6 LBS

## 2025-05-12 DIAGNOSIS — R63.5 WEIGHT GAIN: Primary | ICD-10-CM

## 2025-05-12 DIAGNOSIS — R07.89 OTHER CHEST PAIN: Primary | ICD-10-CM

## 2025-05-12 DIAGNOSIS — I25.42 SPONTANEOUS DISSECTION OF CORONARY ARTERY: ICD-10-CM

## 2025-05-12 DIAGNOSIS — I25.42 CORONARY ARTERY DISSECTION: ICD-10-CM

## 2025-05-12 DIAGNOSIS — N95.1 PERIMENOPAUSAL VASOMOTOR SYMPTOMS: ICD-10-CM

## 2025-05-12 DIAGNOSIS — R07.89 CHEST PRESSURE: ICD-10-CM

## 2025-05-12 LAB
CARDIAC TROPONIN I PNL SERPL HS: 3 NG/L (ref 0–13)
ERYTHROCYTE [DISTWIDTH] IN BLOOD BY AUTOMATED COUNT: 18.4 % (ref 11.5–14.5)
HCT VFR BLD AUTO: 31.6 % (ref 36–46)
HGB BLD-MCNC: 10.2 G/DL (ref 12–16)
MCH RBC QN AUTO: 24.7 PG (ref 26–34)
MCHC RBC AUTO-ENTMCNC: 32.3 G/DL (ref 32–36)
MCV RBC AUTO: 77 FL (ref 80–100)
NRBC BLD-RTO: 0 /100 WBCS (ref 0–0)
PLATELET # BLD AUTO: 311 X10*3/UL (ref 150–450)
RBC # BLD AUTO: 4.13 X10*6/UL (ref 4–5.2)
WBC # BLD AUTO: 9.1 X10*3/UL (ref 4.4–11.3)

## 2025-05-12 PROCEDURE — 99213 OFFICE O/P EST LOW 20 MIN: CPT | Performed by: INTERNAL MEDICINE

## 2025-05-12 PROCEDURE — 93005 ELECTROCARDIOGRAM TRACING: CPT | Performed by: INTERNAL MEDICINE

## 2025-05-12 PROCEDURE — 85045 AUTOMATED RETICULOCYTE COUNT: CPT

## 2025-05-12 PROCEDURE — 84484 ASSAY OF TROPONIN QUANT: CPT

## 2025-05-12 PROCEDURE — 82728 ASSAY OF FERRITIN: CPT

## 2025-05-12 PROCEDURE — 83540 ASSAY OF IRON: CPT

## 2025-05-12 PROCEDURE — 85027 COMPLETE CBC AUTOMATED: CPT

## 2025-05-12 PROCEDURE — 83550 IRON BINDING TEST: CPT

## 2025-05-12 PROCEDURE — 3008F BODY MASS INDEX DOCD: CPT | Performed by: INTERNAL MEDICINE

## 2025-05-12 PROCEDURE — 99214 OFFICE O/P EST MOD 30 MIN: CPT | Performed by: INTERNAL MEDICINE

## 2025-05-12 NOTE — PATIENT INSTRUCTIONS
Continue current meds.  Ordered blood work.    See you back in 6 months.    Bibiana Hudson MD  Co-Director, Vascular Center  [facility], [facility]   [facility] Master Clinician in Fibromuscular Dysplasia and Vascular Care  Professor of Medicine  [facility]

## 2025-05-12 NOTE — PROGRESS NOTES
05/12/25  3:14 PM    Vascular Medicine - FMD/Arterial Dissection Program    This 50 y.o. woman is seen in follow-up of LAD SCAD 11.2023 with extension/progression, anterior MI, subsequent LV apical thrombus.    She had little past medical history -- no hx of migraine  headaches, any prior CV disease, or hypertension. No hx of inflammatory disorders, thyroid disorder, or hyperlipidemia.    On 11.25.2023 she presented to the emergency room with chest discomfort that began while preparing her developmentally disabled son's lunch.  It progressed and was associated with emesis.  She went to [facility] ER,  Initial HR 79 beats per minute, /85 mm Hg.  CT scan chest/abd/pelvis done to r/o dissection and PE (both not present) and initial hs troponin 121 -- 352. Initial ECG demonstrated  Minor T wave abnormality inferiorly c/w NSTEMI.  She was heparinized and taken for urgent coronary angiography done via right radial approach which found tapering stenosis in the mid/distal LAD c/w type SCAD which was managed conservatively.  Her LV gram showed AK/DK apex with an appearance that could be c/w Takotsubo. Heparin was discontinued and she was on aspirin  + Brilinta, then aspirin + Plavix as well as statin Rx and metoprolol.  Echo prior to discharge on 11.27 with LVEF 55% with report of no RWMA.  Peak hs troponin was 1334. She was discharged home on 11.27.2023 on DAPT and metoprolol/statin Rx and felt fine initially, doing light errands, but then returned on 11.29 with onset of recurrent chest discomfort awakening her from sleep.  ECG in ER with slight ST elevation in infero lateral leads (< 1 mm). She was readmitted and reheparinized, started on isordil as well as B Blocker. Repeat coronary angiogram done the following day with progression of the LAD SCAD with occlusion distally; this was managed conservatively.  On repeat ECG, this does look like a possible STEMI/near STEMI.  She had a re-rise/reinfarction with troponin at  "682 initially (down from discharge troponin 725) peaking at 8570. She was discharged home on 12.3.      She subsequent was found to have LV apical thrombus.  Was on Eliquis, then it was stopped; then repeat cardiac MRI with persistent thrombus. She was on Xarelto since July.  She has completed cardiac rehab.    When I saw her in the November, she was doing well.    Unfortunately, Ms. Arevalo is doing poorly today. Over the past months, she has felt exhausted. She is having episodes of facial flushing. She has gained weight.  She has had chest pain, described as \"twinges\" of \"pressure\" multiple times per week, mid chest 5-6/10 in severity and lasting seconds.  Sometimes has occurred with walking, but generally at rest.  She has some dyspnea and some palpitations.    She is taking her B Blocker.    She has been off Xarelto since her cardiac MRI in January.  Menses are now irregular, but has not had very heavy menses. Doing OK on aspirin.    She has no FMD on below neck imaging and prior 92-gene arteriopathy panel negative.      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      aspirin 81 mg chewable tablet CHEW 1 TABLET BY MOUTH ONCE DAILY. 90 tablet 0    atorvastatin (Lipitor) 40 mg tablet Take 1 tablet (40 mg) by mouth once " "daily at bedtime. 90 tablet 3    B2-magnesium cit,oxid-feverfew (MigreLief) 200-180-50 mg tablet Take by mouth.      FLUoxetine (PROzac) 20 mg capsule 1 capsule (20 mg) once every 24 hours.      metoprolol succinate XL (Toprol-XL) 50 mg 24 hr tablet Take 1 tablet (50 mg) by mouth once daily. Do not crush or chew. 90 tablet 3    nitroglycerin (Nitrostat) 0.4 mg SL tablet Place 1 tablet (0.4 mg) under the tongue every 5 minutes if needed for chest pain for up to 5 doses. 5 tablet 3     No current facility-administered medications for this visit.       Physical Exam  Patient Vitals for the past 24 hrs:   BP Pulse Resp SpO2 Height Weight   05/12/25 1455 149/88 -- -- -- -- --   05/12/25 1454 148/86 75 18 97 % 1.6 m (5' 3\") 90.1 kg (198 lb 9.6 oz)   Weight up 11# since last visit  HEENT benign  JVP flat  +S1, S2, RRR; soft systolic murmur  Clear lungs  Trace bipedal edema    92 gene [employer]ae arteriopathy panel negative    ECG reviewed NSR at 75 beats per minute  T wave inversions across precordium have improved now normal, no ischemic changes  Rightward axis    1.2025 Cardiac MRI  LV apical thrombus has resolved  IMPRESSION:  1. Interval resolution of LV apical thrombus.  2. The left ventricle is normal in size, shape, and has normal global  systolic function. LVEF = 62%. There are no segmental wall motion  abnormalities.  Quantitative values are as noted above.  3. Normal RV size and systolic function. RVEF = 55%.  4. Findings consistent with transmural infarction involving the  apical septal and inferior walls as well as the true apex, unchanged  from prior exam.  5. Diffusely increased myocardial T1 mapping times within the sampled  mid myocardial segments, suggestive of underlying interstitial  fibrosis.  6. Mildly dilated left atrium.      7.2024 Cardiac MRI  IMPRESSION:  1. There is a small circular thrombus located at the level of the LV  apex measuring 6 x 4 mm.  2. Normal biventricular size and function. LVEF 66%. " RVEF 58%.  3. Prior infarct in the distal LAD territory as signified by near  transmural scar at the level of the LV apex and distal apical septal  wall. Estimated scar thickness of %.  4. There are no findings to suggest an infiltrative or inflammatory  process.  5. Normal aortic, mitral, and tricuspid valve function.         Cardiac MRI 4.4.2024  IMPRESSION:  1. No evidence of inducible myocardial ischemia using Regadenoson  stress perfusion imaging.  2. Normal LV size and systolic function. Quantitative LVEF 68 %.  Dyskinesis of true apex well-visualized in the 3 chamber view.  Evidence of LV thrombus.  3. There is evidence of transmural infarction of the distal apical  septum, true apex and distal apical inferior wall (%). LV scar  26%. Distal LAD stenosis  4. Normal RV size and systolic function. Quantitative RVEF51%.  5. Trivial AR (RF 2%), mild MR (RF 26%), and mild qualitative TR     Echo 3.5.2024  CONCLUSIONS:   1. Left ventricular systolic function is mildly decreased.   2. Entire apex is abnormal.   3. Spectral Doppler shows an impaired relaxation pattern of left ventricular diastolic filling.   4. There is a small left ventricular thrombus.   5. Possible apical thrombus - recommend MRI.    12.2023 Echo done, low normal LVEF, apical RWMA  CONCLUSIONS:   1. Left ventricular systolic function is low normal with a 50-55% estimated ejection fraction.   2. [place] and apical septal segment are abnormal.   3. Spectral Doppler shows an impaired relaxation pattern of left ventricular diastolic filling.    12.2023 CTA head/neck previously reviewed  Right ICA s-curve, tortuosity, left ICA loop, but no classical FMD  No IC aneurysms    Prior catheter angiogram review  I do think here original angiogram shows a mid/distal LAD lesion c/w type II SCAD with APRIL III flow with more severe narrowing of the distal LAD on subsequent angiogram    11.2023 CTA c/a/p   No FMD or visceral/aortic aneurysms    11.29.2023  Echo  LVEF 50-55%  Apical septal, apical anterior, apex, apical inferior RWMA    Assessment/Plan/Recommendations:  50 y.o. 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 She was discharged home then had a new MI event/extension/progression of the LAD lesion. She has recovered from this. She has normal LVEF and apical RWMA and developed LV apical thrombus.  She has no clear FMD and 92 gene arteriopathy panel better.    We touched on many things today.    From a SCAD management perspective, she is having recurrent chest pain, though atypical for angina and lasting seconds. Recent cardiac MRI no new RWMA. I will check troponin to be sure negative. My sense is this is not new coronary/SCAD event.  Would continue B Blocker and aspirin and we stopped Xarelto as the LV apical thrombus has resolved.    She has no underlying FMD or identifiable genetic arteriopathy; plan repeat head to pelvis imaging 5-7 year jose post event.    She is having fatigue, flushing, weight gain. She has irregular periods. I think her symptoms are consistent with perimenopause with vasomotor sx. She has a general GYN RAHEEM, but I will send to [facility]. We need to exhaust non hormonal therapies first --- she had thrombotic event (LV apical thrombus). I will check TSH in terms of weight gain.    I will see her again in 6 months; interval follow up on labs and menopause clinic evaluation.    Bibiana Hudson MD               [1]   Patient Active Problem List  Diagnosis    Myocardial infarction (Multi)    Acute otitis media    Infection of tooth    Otitis externa of left ear    Symptoms of upper respiratory infection (URI)    Arteriosclerosis of coronary artery    Dyspnea on exertion    Fatigue    Acute chest pain    Apical myocardial infarction greater than 8 weeks ago     Hyperlipidemia    Spontaneous dissection of coronary artery    Snoring    Anxiety about health    Left ventricular apical thrombus    Mild left ventricular systolic dysfunction    Other thrombophilia    LV (left ventricular) mural thrombus    Frequent headaches

## 2025-05-12 NOTE — LETTER
May 12, 2025     Sam Rubio MD  [address] [facility] [address]    Patient: Mandie Arevalo   YOB: 1975   Date of Visit: 5/12/2025       Dear Dr. Sam Rubio MD:    Thank you for referring Mandie Arevalo to me for evaluation. Below are my notes for this consultation.  If you have questions, please do not hesitate to call me. I look forward to following your patient along with you.       Sincerely,     Bibiana Hudson MD      CC: Gnee Hermosillo MD  ______________________________________________________________________________________    05/12/25  3:14 PM    Vascular Medicine - FMD/Arterial Dissection Program    This 50 y.o. woman is seen in follow-up of LAD SCAD 11.2023 with extension/progression, anterior MI, subsequent LV apical thrombus.    She had little past medical history -- no hx of migraine  headaches, any prior CV disease, or hypertension. No hx of inflammatory disorders, thyroid disorder, or hyperlipidemia.    On 11.25.2023 she presented to the emergency room with chest discomfort that began while preparing her developmentally disabled son's lunch.  It progressed and was associated with emesis.  She went to [facility] ER,  Initial HR 79 beats per minute, /85 mm Hg.  CT scan chest/abd/pelvis done to r/o dissection and PE (both not present) and initial hs troponin 121 -- 352. Initial ECG demonstrated  Minor T wave abnormality inferiorly c/w NSTEMI.  She was heparinized and taken for urgent coronary angiography done via right radial approach which found tapering stenosis in the mid/distal LAD c/w type SCAD which was managed conservatively.  Her LV gram showed AK/DK apex with an appearance that could be c/w Takotsubo. Heparin was discontinued and she was on aspirin  + Brilinta, then aspirin + Plavix as well as statin Rx and metoprolol.  Echo prior to discharge on 11.27 with LVEF 55% with report of no RWMA.  Peak hs troponin was 1334. She was discharged home on 11.27.2023  "on DAPT and metoprolol/statin Rx and felt fine initially, doing light errands, but then returned on 11.29 with onset of recurrent chest discomfort awakening her from sleep.  ECG in ER with slight ST elevation in infero lateral leads (< 1 mm). She was readmitted and reheparinized, started on isordil as well as B Blocker. Repeat coronary angiogram done the following day with progression of the LAD SCAD with occlusion distally; this was managed conservatively.  On repeat ECG, this does look like a possible STEMI/near STEMI.  She had a re-rise/reinfarction with troponin at 682 initially (down from discharge troponin 725) peaking at 8570. She was discharged home on 12.3.      She subsequent was found to have LV apical thrombus.  Was on Eliquis, then it was stopped; then repeat cardiac MRI with persistent thrombus. She was on Xarelto since July.  She has completed cardiac rehab.    When I saw her in the November, she was doing well.    Unfortunately, Ms. Arevalo is doing poorly today. Over the past months, she has felt exhausted. She is having episodes of facial flushing. She has gained weight.  She has had chest pain, described as \"twinges\" of \"pressure\" multiple times per week, mid chest 5-6/10 in severity and lasting seconds.  Sometimes has occurred with walking, but generally at rest.  She has some dyspnea and some palpitations.    She is taking her B Blocker.    She has been off Xarelto since her cardiac MRI in January.  Menses are now irregular, but has not had very heavy menses. Doing OK on aspirin.    She has no FMD on below neck imaging and prior 92-gene arteriopathy panel negative.      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     • aspirin 81 mg chewable tablet CHEW 1 TABLET BY MOUTH ONCE DAILY. 90 tablet 0   • atorvastatin (Lipitor) 40 mg tablet Take 1 tablet (40 mg) by mouth once daily at bedtime. 90 tablet 3   • B2-magnesium cit,oxid-feverfew (MigreLief) 200-180-50 mg tablet Take by mouth.     • FLUoxetine (PROzac) 20 mg capsule 1 capsule (20 mg) once every 24 hours.     • metoprolol succinate XL (Toprol-XL) 50 mg 24 hr tablet Take 1 tablet (50 mg) by mouth once daily. Do not crush or chew. 90 tablet 3   • nitroglycerin (Nitrostat) 0.4 mg SL tablet Place 1 tablet (0.4 mg) under the tongue every 5 minutes if needed for chest pain for up to 5 doses. 5 tablet 3     No current facility-administered medications for this visit.       Physical Exam  Patient Vitals for the past 24 hrs:   BP Pulse Resp SpO2 Height Weight   05/12/25 1455 149/88 -- -- -- -- --   05/12/25 1454 148/86 75 18 97 % 1.6 m (5' 3\") 90.1 kg (198 lb 9.6 oz)   Weight up 11# since last visit  HEENT benign  JVP flat  +S1, S2, RRR; soft systolic murmur  Clear lungs  Trace bipedal edema    92 gene [employer] arteriopathy panel negative    ECG reviewed NSR at 75 beats per minute  T wave inversions across precordium have improved now normal, no ischemic changes  Rightward axis    1.2025 Cardiac MRI  LV apical thrombus has resolved  IMPRESSION:  1. Interval resolution of LV apical thrombus.  2. The left ventricle is normal in size, shape, and has normal global  systolic function. LVEF = 62%. There are no segmental wall motion  abnormalities.  Quantitative values " are as noted above.  3. Normal RV size and systolic function. RVEF = 55%.  4. Findings consistent with transmural infarction involving the  apical septal and inferior walls as well as the true apex, unchanged  from prior exam.  5. Diffusely increased myocardial T1 mapping times within the sampled  mid myocardial segments, suggestive of underlying interstitial  fibrosis.  6. Mildly dilated left atrium.      7.2024 Cardiac MRI  IMPRESSION:  1. There is a small circular thrombus located at the level of the LV  apex measuring 6 x 4 mm.  2. Normal biventricular size and function. LVEF 66%. RVEF 58%.  3. Prior infarct in the distal LAD territory as signified by near  transmural scar at the level of the LV apex and distal apical septal  wall. Estimated scar thickness of %.  4. There are no findings to suggest an infiltrative or inflammatory  process.  5. Normal aortic, mitral, and tricuspid valve function.         Cardiac MRI 4.4.2024  IMPRESSION:  1. No evidence of inducible myocardial ischemia using Regadenoson  stress perfusion imaging.  2. Normal LV size and systolic function. Quantitative LVEF 68 %.  Dyskinesis of true apex well-visualized in the 3 chamber view.  Evidence of LV thrombus.  3. There is evidence of transmural infarction of the distal apical  septum, true apex and distal apical inferior wall (%). LV scar  26%. Distal LAD stenosis  4. Normal RV size and systolic function. Quantitative RVEF51%.  5. Trivial AR (RF 2%), mild MR (RF 26%), and mild qualitative TR     Echo 3.5.2024  CONCLUSIONS:   1. Left ventricular systolic function is mildly decreased.   2. Entire apex is abnormal.   3. Spectral Doppler shows an impaired relaxation pattern of left ventricular diastolic filling.   4. There is a small left ventricular thrombus.   5. Possible apical thrombus - recommend MRI.    12.2023 Echo done, low normal LVEF, apical RWMA  CONCLUSIONS:   1. Left ventricular systolic function is low normal with a  50-55% estimated ejection fraction.   2. [place] and apical septal segment are abnormal.   3. Spectral Doppler shows an impaired relaxation pattern of left ventricular diastolic filling.    12.2023 CTA head/neck previously reviewed  Right ICA s-curve, tortuosity, left ICA loop, but no classical FMD  No IC aneurysms    Prior catheter angiogram review  I do think here original angiogram shows a mid/distal LAD lesion c/w type II SCAD with APRIL III flow with more severe narrowing of the distal LAD on subsequent angiogram    11.2023 CTA c/a/p   No FMD or visceral/aortic aneurysms    11.29.2023 Echo  LVEF 50-55%  Apical septal, apical anterior, apex, apical inferior RWMA    Assessment/Plan/Recommendations:  50 y.o. 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 She was discharged home then had a new MI event/extension/progression of the LAD lesion. She has recovered from this. She has normal LVEF and apical RWMA and developed LV apical thrombus.  She has no clear FMD and 92 gene arteriopathy panel better.    We touched on many things today.    From a SCAD management perspective, she is having recurrent chest pain, though atypical for angina and lasting seconds. Recent cardiac MRI no new RWMA. I will check troponin to be sure negative. My sense is this is not new coronary/SCAD event.  Would continue B Blocker and aspirin and we stopped Xarelto as the LV apical thrombus has resolved.    She has no underlying FMD or identifiable genetic arteriopathy; plan repeat head to pelvis imaging 5-7 year jose post event.    She is having fatigue, flushing, weight gain. She has irregular periods. I think her symptoms are consistent with perimenopause with vasomotor sx. She has a general GYN RAHEEM, but I will send to [facility]. We need to exhaust non hormonal therapies first  --- she had thrombotic event (LV apical thrombus). I will check TSH in terms of weight gain.    I will see her again in 6 months; interval follow up on labs and menopause clinic evaluation.    Bibiana Hudson MD               [1]  Patient Active Problem List  Diagnosis   • Myocardial infarction (Multi)   • Acute otitis media   • Infection of tooth   • Otitis externa of left ear   • Symptoms of upper respiratory infection (URI)   • Arteriosclerosis of coronary artery   • Dyspnea on exertion   • Fatigue   • Acute chest pain   • Apical myocardial infarction greater than 8 weeks ago   • Hyperlipidemia   • Spontaneous dissection of coronary artery   • Snoring   • Anxiety about health   • Left ventricular apical thrombus   • Mild left ventricular systolic dysfunction   • Other thrombophilia   • LV (left ventricular) mural thrombus   • Frequent headaches        [1]  Patient Active Problem List  Diagnosis   • Myocardial infarction (Multi)   • Acute otitis media   • Infection of tooth   • Otitis externa of left ear   • Symptoms of upper respiratory infection (URI)   • Arteriosclerosis of coronary artery   • Dyspnea on exertion   • Fatigue   • Acute chest pain   • Apical myocardial infarction greater than 8 weeks ago   • Hyperlipidemia   • Spontaneous dissection of coronary artery   • Snoring   • Anxiety about health   • Left ventricular apical thrombus   • Mild left ventricular systolic dysfunction   • Other thrombophilia   • LV (left ventricular) mural thrombus   • Frequent headaches

## 2025-05-13 ENCOUNTER — PATIENT MESSAGE (OUTPATIENT)
Dept: CARDIOLOGY | Facility: HOSPITAL | Age: 50
End: 2025-05-13
Payer: COMMERCIAL

## 2025-05-13 LAB
ATRIAL RATE: 75 BPM
FERRITIN SERPL-MCNC: 14 NG/ML (ref 8–150)
HGB RETIC QN: 27 PG (ref 28–38)
IMMATURE RETIC FRACTION: 23.5 %
IRON SATN MFR SERPL: 12 % (ref 25–45)
IRON SERPL-MCNC: 52 UG/DL (ref 35–150)
P AXIS: 51 DEGREES
P OFFSET: 193 MS
P ONSET: 135 MS
PR INTERVAL: 168 MS
Q ONSET: 219 MS
QRS COUNT: 12 BEATS
QRS DURATION: 84 MS
QT INTERVAL: 420 MS
QTC CALCULATION(BAZETT): 469 MS
QTC FREDERICIA: 452 MS
R AXIS: 93 DEGREES
RETICS #: 0.08 X10*6/UL (ref 0.02–0.08)
RETICS/RBC NFR AUTO: 1.8 % (ref 0.5–2)
T AXIS: 40 DEGREES
T OFFSET: 429 MS
TIBC SERPL-MCNC: 432 UG/DL (ref 240–445)
TSH SERPL-ACNC: 2.55 MIU/L
UIBC SERPL-MCNC: 380 UG/DL (ref 110–370)
VENTRICULAR RATE: 75 BPM

## 2025-05-22 ENCOUNTER — PATIENT MESSAGE (OUTPATIENT)
Dept: CARDIOLOGY | Facility: HOSPITAL | Age: 50
End: 2025-05-22
Payer: COMMERCIAL

## 2025-06-02 DIAGNOSIS — I21.4 NSTEMI (NON-ST ELEVATED MYOCARDIAL INFARCTION) (MULTI): ICD-10-CM

## 2025-06-03 LAB
ATRIAL RATE: 75 BPM
P AXIS: 51 DEGREES
P OFFSET: 193 MS
P ONSET: 135 MS
PR INTERVAL: 168 MS
Q ONSET: 219 MS
QRS COUNT: 12 BEATS
QRS DURATION: 84 MS
QT INTERVAL: 420 MS
QTC CALCULATION(BAZETT): 469 MS
QTC FREDERICIA: 452 MS
R AXIS: 93 DEGREES
T AXIS: 40 DEGREES
T OFFSET: 429 MS
VENTRICULAR RATE: 75 BPM

## 2025-06-15 RX ORDER — NAPROXEN SODIUM 220 MG/1
81 TABLET, FILM COATED ORAL DAILY
Qty: 90 TABLET | Refills: 3 | Status: SHIPPED | OUTPATIENT
Start: 2025-06-15

## 2025-06-25 ENCOUNTER — APPOINTMENT (OUTPATIENT)
Dept: OBSTETRICS AND GYNECOLOGY | Facility: CLINIC | Age: 50
End: 2025-06-25
Payer: COMMERCIAL

## 2025-06-25 ENCOUNTER — LAB REQUISITION (OUTPATIENT)
Dept: LAB | Facility: HOSPITAL | Age: 50
End: 2025-06-25

## 2025-06-25 VITALS
DIASTOLIC BLOOD PRESSURE: 70 MMHG | HEIGHT: 63 IN | SYSTOLIC BLOOD PRESSURE: 124 MMHG | BODY MASS INDEX: 35.08 KG/M2 | WEIGHT: 198 LBS

## 2025-06-25 DIAGNOSIS — N93.9 ABNORMAL UTERINE AND VAGINAL BLEEDING, UNSPECIFIED: ICD-10-CM

## 2025-06-25 DIAGNOSIS — N95.1 PERIMENOPAUSAL VASOMOTOR SYMPTOMS: ICD-10-CM

## 2025-06-25 DIAGNOSIS — N93.9 ABNORMAL UTERINE BLEEDING (AUB): Primary | ICD-10-CM

## 2025-06-25 LAB
ALBUMIN SERPL BCP-MCNC: 4.1 G/DL (ref 3.4–5)
ALP SERPL-CCNC: 79 U/L (ref 33–110)
ALT SERPL W P-5'-P-CCNC: 15 U/L (ref 7–45)
ANION GAP SERPL CALC-SCNC: 8 MMOL/L (ref 10–20)
AST SERPL W P-5'-P-CCNC: 13 U/L (ref 9–39)
BILIRUB SERPL-MCNC: 0.3 MG/DL (ref 0–1.2)
BUN SERPL-MCNC: 12 MG/DL (ref 6–23)
CALCIUM SERPL-MCNC: 9 MG/DL (ref 8.6–10.3)
CHLORIDE SERPL-SCNC: 107 MMOL/L (ref 98–107)
CO2 SERPL-SCNC: 26 MMOL/L (ref 21–32)
CREAT SERPL-MCNC: 0.81 MG/DL (ref 0.5–1.05)
EGFRCR SERPLBLD CKD-EPI 2021: 89 ML/MIN/1.73M*2
GLUCOSE SERPL-MCNC: 93 MG/DL (ref 74–99)
POTASSIUM SERPL-SCNC: 4.1 MMOL/L (ref 3.5–5.3)
PROT SERPL-MCNC: 6.9 G/DL (ref 6.4–8.2)
SODIUM SERPL-SCNC: 137 MMOL/L (ref 136–145)

## 2025-06-25 PROCEDURE — 80053 COMPREHEN METABOLIC PANEL: CPT

## 2025-06-25 PROCEDURE — 3008F BODY MASS INDEX DOCD: CPT | Performed by: OBSTETRICS & GYNECOLOGY

## 2025-06-25 PROCEDURE — 99214 OFFICE O/P EST MOD 30 MIN: CPT | Performed by: OBSTETRICS & GYNECOLOGY

## 2025-06-25 ASSESSMENT — ENCOUNTER SYMPTOMS
EYES NEGATIVE: 0
HEMATOLOGIC/LYMPHATIC NEGATIVE: 0
GASTROINTESTINAL NEGATIVE: 0
ENDOCRINE NEGATIVE: 0
PSYCHIATRIC NEGATIVE: 0
CARDIOVASCULAR NEGATIVE: 0
CONSTITUTIONAL NEGATIVE: 0
NEUROLOGICAL NEGATIVE: 0
MUSCULOSKELETAL NEGATIVE: 0
ALLERGIC/IMMUNOLOGIC NEGATIVE: 0
RESPIRATORY NEGATIVE: 0

## 2025-06-25 ASSESSMENT — PAIN SCALES - GENERAL: PAINLEVEL_OUTOF10: 0-NO PAIN

## 2025-06-25 NOTE — PROGRESS NOTES
Assessment and Plan:  Mandie Arevaol is a 51 y/o woman presenting today for evaluation of AUB.     Diagnoses:  #1 AUB  #2 Perimenopausal vasomotor symptoms    Assessment/Plan:  1. Menopause symptoms with some AUB/missed cycles  - Will check prolactin and FSH.  - She had a normal TSH by a PCP.  - Pelvic ultrasound.  - Information about menopause and ways to manage menopausal symptoms, including [place].  - Information about the Mirena IUD as a way to decrease heavy vaginal bleeding if she restarts blood thinners.  - Return to the office to review labs, ultrasound, and come up with a plan of care.    Follow up with Dr. Sanders.    Briae Attestation  By signing my name below, I, Shashi Salmeron, attest that this documentation has been prepared under the direction and in the presence of Heather Sanders MD on 2025 at 6:17 PM.     HPI:   Mandie Arevalo is a 51 y/o woman presenting today for evaluation of AUB. She reports having two MIs in 2023, followed by cardiac rehab and treatment for blood clots with blood thinners. She has been experiencing excessively heavy menstrual bleeding. Since discontinuing the blood thinners in January, the patient has had irregular periods, with no menstruation in February, March, May, or , but a period in April.    She also reports significant weight gain, approximately 20-30 pounds, over a month and a half, without changes in diet. She also reports hot flashes.    Past medical hx: Anemia.    Family medical hx: Father has HTN and CHF. Paternal grandmother had DM. Mother passed from pancreatic cancer. Paternal aunt had breast cancer.    Surgical hx:  section x2. Nose surgery. Tubal ligation.    Social hx: No smoking, vaping, alcohol use, or drug use. She is .     GYN hx: M13y/o. No hx of STIs. Hx of abnormal paps with normal follow ups. Currently SA. No hx of sexual abuse.    OB hx:.  section x2. EAB x1. SAB x1.    ROS:  Review of  Systems   Constitutional:         Positive for hot flashes.   Genitourinary:  Positive for menstrual problem (AUB/missed cycles).     Physical Exam:   Physical Exam  Constitutional:       General: She is not in acute distress.     Appearance: Normal appearance. She is obese.   Neurological:      Mental Status: She is alert and oriented to person, place, and time.      Comments: Pleasant and cooperative

## 2025-06-25 NOTE — PATIENT INSTRUCTIONS
Thanks for coming in today for follow-up.    Labs are being sent to help evaluate your abnormal cycle.  Results should be available in the next 24 to 48 hours.  You may call the office and select option #2 to speak with the nurse to obtain the results.    Arrange to have an ultrasound performed to help evaluate your abnormal bleeding.    Review the information about ways to treat menopause symptoms.    Review the information about the Mirena IUD as a way to decrease heavy menses.    Return the office next few weeks to review your labs, ultrasound and come up with the plan of care.    Follow-up with your PCP and other healthcare specialist as needed.

## 2025-06-26 LAB
FSH SERPL-ACNC: 23.4 MIU/ML
PROLACTIN SERPL-MCNC: 13 NG/ML

## 2025-06-28 ENCOUNTER — HOSPITAL ENCOUNTER (OUTPATIENT)
Dept: RADIOLOGY | Facility: HOSPITAL | Age: 50
Discharge: HOME | End: 2025-06-28
Payer: COMMERCIAL

## 2025-06-28 DIAGNOSIS — N93.9 ABNORMAL UTERINE BLEEDING (AUB): ICD-10-CM

## 2025-06-28 PROCEDURE — 76830 TRANSVAGINAL US NON-OB: CPT | Performed by: STUDENT IN AN ORGANIZED HEALTH CARE EDUCATION/TRAINING PROGRAM

## 2025-06-28 PROCEDURE — 76830 TRANSVAGINAL US NON-OB: CPT

## 2025-06-28 PROCEDURE — 76856 US EXAM PELVIC COMPLETE: CPT | Performed by: STUDENT IN AN ORGANIZED HEALTH CARE EDUCATION/TRAINING PROGRAM

## 2025-07-14 PROCEDURE — 88342 IMHCHEM/IMCYTCHM 1ST ANTB: CPT | Performed by: PATHOLOGY

## 2025-07-14 PROCEDURE — 0753T DGTZ GLS MCRSCP SLD LEVEL IV: CPT

## 2025-07-14 PROCEDURE — 88342 IMHCHEM/IMCYTCHM 1ST ANTB: CPT

## 2025-07-14 PROCEDURE — 88305 TISSUE EXAM BY PATHOLOGIST: CPT | Performed by: PATHOLOGY

## 2025-07-14 PROCEDURE — 88305 TISSUE EXAM BY PATHOLOGIST: CPT

## 2025-07-14 PROCEDURE — 0760T DGTZ GLS MCRSCP SL IMM 1ST: CPT

## 2025-07-15 ENCOUNTER — CLINICAL SUPPORT (OUTPATIENT)
Dept: CARDIOLOGY | Facility: CLINIC | Age: 50
End: 2025-07-15
Payer: COMMERCIAL

## 2025-07-15 ENCOUNTER — APPOINTMENT (OUTPATIENT)
Dept: CARDIOLOGY | Facility: CLINIC | Age: 50
End: 2025-07-15
Payer: COMMERCIAL

## 2025-07-15 DIAGNOSIS — E78.2 MIXED HYPERLIPIDEMIA: ICD-10-CM

## 2025-07-15 DIAGNOSIS — I51.3 LEFT VENTRICULAR APICAL THROMBUS: Primary | ICD-10-CM

## 2025-07-15 DIAGNOSIS — Z71.3 DIETARY COUNSELING AND SURVEILLANCE: ICD-10-CM

## 2025-07-15 PROCEDURE — 97802 MEDICAL NUTRITION INDIV IN: CPT | Performed by: INTERNAL MEDICINE

## 2025-07-15 NOTE — PROGRESS NOTES
Assessment:  Pt is a 50 y.o. female with a past medical history of hyperlipidemia, left ventricular apical thrombus referred by Fabián Garcia PA-C for initial nutrition assessment. Pt reports she had 2 heart attacks within 5 days of each other. Reports she did not have any plaque build up, both heart attacks were SCAD related.  She did attend cardiac rehab in 2024. She did not have much diet education from rehab on a specific diet to follow. Pt reports she was recently diagnosed as being anemic. Recently had endoscopy and colonoscopy. Reports she is being tested for celiac disease. Will not have results for several weeks. Reports she gained approximately 20-30 pounds in 1.5 months (from April - May of 2025). Pt did follow up with OB/GYN. Test for FSH results were consistent with perimenopause. Ultrasound also showed uterine fibroids. Pt reports spouse is currently following a carnivore diet and thinks she should follow one as well. Pt is here today to see what type of diet to follow and get some additional education on weight management and diet.      Lab Results   Component Value Date    HGBA1C 5.5 11/26/2023    CHOL 184 11/26/2023    TRIG 105 11/26/2023    HDL 48.5 11/26/2023      Typical Intake:  Breakfast: English Muffin (white) with scrambled egg or wheat chex or cheerios with fruit - berries. Uses 2% milk  Snack: Fruit or crackers (Ritz r Triscuits); smart food popcorn, drinks water if feels hungry.  Lunch: Salad or chicken or salad with chicken, sandwich (wheat bread- [employer]) with ham dry - occasionally Elsie or Mozz cheese, pizza - 1 piece  Snack: Trail mix - handful, crackers, water  Dinner: Hamburger, chicken, steak, Gyro meat - lamb, salad -  or with chicken  Snack: N/A  Beverages: Water most of the day, occasionally a soda pop, lemonade - sparkling Ice, 6 bottles of 16 oz water.    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 Pt has started using a smaller plate, but  sometimes only eats 50% of what is on smaller plate.     Meal preparation: Pt  Dining out/take out/fast food: 2x/week, [employer], [employer]ne[employer], Arby's or Chick-melony-a  Label Reading: Occasionally - reads for sugar, salt, protein.  Shopping: Pt - does online shopping. [employer]    Sleep: Sometimes has trouble falling asleep. Used to use melatonin, but not currently using.   Stress: Pt does report having some PTSD following her SCAD episodes. Is currently on a wait list for counseling services, but has not heard from them yet.      Allergies: None  Intolerance: None  Appetite: Normal  Intake: 50-75%  GI Symptoms : diarrhea, increased gas, and bloating; softer stools since taking iron pills. Frequency: daily  Swallowing Difficulty: No problems with swallowing  Dentition : own    Vitamins/minerals/supplements: Iron    Exercise: Recently she has been walking 3-4x/week for 1.5 miles (due to not feeling well), but usually walks 1.5 - 3 miles.     Diagnosis:   Diagnosis Statement 1:  Diagnosis Status: New  Diagnosis : Food and nutrition related knowledge deficit related to food and nutrition related knowledge deficit concerning appropriate type of diet to followas evidenced by pt wanting to seek out what type of diet is best to follow for weight management and heart health.     Intervention:  MNT for Mediterranean Diet, weight management    Educational Materials provided: Heart Healthy Diet tips, Mediterranean Pyramid, Plate, Label, NCM foods high in iron.     Use plate for balance and portion control. Discussed choosing more nutrient dense foods at meal times-whole grain breads, adding lean proteins at meals/snacks. Discussed how balanced meals can help prevent unnecessary snacking. Encouraged balanced snacks between breakfast/lunch, lunch /dinner. Reviewed healthy snack options and lean proteins to add at meal/snack time.   Follow Mediterranean Diet. Focus on choosing healthy fats (olive oil, avocado, nuts, seeds, fatty  fish), choose lean protein sources (chicken/turkey breast, beans, egg whites, nuts, seeds, lean cuts of red meat-loin/round cuts, at least 90% lean ground beef), choose fatty fish 2x/week, increase intake of whole grains, fruits, and vegetables.   Read labels for sodium, sat fats, added sugars, fiber. Reviewed daily guidelines for sodium and added sugars. Encouraged more high fiber foods and what guidelines to look for when choosing lower sat fat and fiber.   Exercise. Discussed the benefits of exercise for weight management. Encouraged pt to discuss with physician her limitations for strength building exercises.   Practice stress management techniques to help reduce stress levels. Encouraged pt to follow up with wait list for counseling as well as try some stress management apps to help with reducing stress. Discussed how stress and sleep can play a role in weight management.     Monitoring and Evaluation:  Will monitor weight trend, intake, labs, and pt progress.     Celia Morin, RDN, LD

## 2025-07-18 ENCOUNTER — LAB REQUISITION (OUTPATIENT)
Dept: LAB | Facility: HOSPITAL | Age: 50
End: 2025-07-18
Payer: COMMERCIAL

## 2025-07-18 DIAGNOSIS — D50.0 IRON DEFICIENCY ANEMIA SECONDARY TO BLOOD LOSS (CHRONIC): ICD-10-CM

## 2025-07-24 ENCOUNTER — APPOINTMENT (OUTPATIENT)
Dept: OBSTETRICS AND GYNECOLOGY | Facility: CLINIC | Age: 50
End: 2025-07-24
Payer: COMMERCIAL

## 2025-07-24 VITALS
BODY MASS INDEX: 34.76 KG/M2 | HEIGHT: 63 IN | DIASTOLIC BLOOD PRESSURE: 84 MMHG | SYSTOLIC BLOOD PRESSURE: 130 MMHG | WEIGHT: 196.2 LBS

## 2025-07-24 DIAGNOSIS — F43.9 STRESS: ICD-10-CM

## 2025-07-24 DIAGNOSIS — N95.1 PERIMENOPAUSAL VASOMOTOR SYMPTOMS: Primary | ICD-10-CM

## 2025-07-24 PROCEDURE — 3008F BODY MASS INDEX DOCD: CPT | Performed by: OBSTETRICS & GYNECOLOGY

## 2025-07-24 PROCEDURE — 99213 OFFICE O/P EST LOW 20 MIN: CPT | Performed by: OBSTETRICS & GYNECOLOGY

## 2025-07-24 ASSESSMENT — ENCOUNTER SYMPTOMS
ALLERGIC/IMMUNOLOGIC NEGATIVE: 0
RESPIRATORY NEGATIVE: 0
ENDOCRINE NEGATIVE: 0
HEADACHES: 1
CONSTITUTIONAL NEGATIVE: 0
MUSCULOSKELETAL NEGATIVE: 0
NEUROLOGICAL NEGATIVE: 0
PSYCHIATRIC NEGATIVE: 0
GASTROINTESTINAL NEGATIVE: 0
CARDIOVASCULAR NEGATIVE: 0
EYES NEGATIVE: 0
HEMATOLOGIC/LYMPHATIC NEGATIVE: 0

## 2025-07-24 ASSESSMENT — PAIN SCALES - GENERAL: PAINLEVEL_OUTOF10: 0-NO PAIN

## 2025-07-24 NOTE — PROGRESS NOTES
Assessment and Plan:  Mandie Arevalo is a 49 y/o  woman presenting today for follow up.     Diagnoses:  #1 Perimenopausal vasomotor symptoms  #2 Stress    Assessment/Plan:  1. Perimenopausal/menopausal with fibroids on recent ultrasound and missed cycles  - Discussed with patient that it may be normal to have some irregular bleeding in the first year or two when starting menopause. It is also normal to have fluctuations in her FSH during this time.  - Return to the office if the bleeding becomes more abnormal.  - Follow up with PCP and other healthcare specialists PRN.  - Referral made to [facility] to help with stress with her multiple medical issues and new family diagnosis.  - Return to the office in 2026 for annual exam. She had it performed with an outside provider.  - Fibroid information provided.  - Information about menopause and ways to manage menopausal symptoms and vaginal dryness provided.  - Samples of vaginal lubricants given.    Follow up with Dr. Sanders.    Danielleibe Attestation  By signing my name below, I, Shashi Salmeron, attest that this documentation has been prepared under the direction and in the presence of Heather Sanders MD on 2025 at 5:28 PM.     HPI:   Mandie Arevalo is a 49 y/o  woman presenting today for follow up. She was seen at the end of  for evaluation of AUB.     She has a history of MI x2 and was doing cardiac rehab and received blood thinners. The patient noted very heavy menstrual cycles with blood thinner use.    She began having some skipped/missed cycles and some menopause symptoms. She had a normal TSH and an FSH slightly above the postmenopausal range at 23.4 (postmenopausal 23.0 to 116.3).     A 2025 ultrasound showed a 10.1 cm, multifibroid uterus, the largest measuring 2.4 cm and a normal endometrial stripe of 0.8 cm.     She reports headaches 3-4 times per week. She was previously advised to avoid Excedrin  Migraine due to erosive gastritis and is currently taking aspirin daily.    ROS:  Review of Systems   Genitourinary:  Positive for menstrual problem (AUB).   Neurological:  Positive for headaches.     Physical Exam:   Physical Exam  Constitutional:       General: She is not in acute distress.     Appearance: Normal appearance. She is obese.     Neurological:      Mental Status: She is alert and oriented to person, place, and time.      Comments: Pleasant and cooperative

## 2025-07-28 LAB
LAB AP ASR DISCLAIMER: NORMAL
LABORATORY COMMENT REPORT: NORMAL
PATH REPORT.FINAL DX SPEC: NORMAL
PATH REPORT.GROSS SPEC: NORMAL
PATH REPORT.RELEVANT HX SPEC: NORMAL
PATH REPORT.TOTAL CANCER: NORMAL

## 2025-10-28 ENCOUNTER — APPOINTMENT (OUTPATIENT)
Dept: CARDIOLOGY | Facility: CLINIC | Age: 50
End: 2025-10-28
Payer: COMMERCIAL

## 2026-04-01 ENCOUNTER — APPOINTMENT (OUTPATIENT)
Dept: OBSTETRICS AND GYNECOLOGY | Facility: CLINIC | Age: 51
End: 2026-04-01
Payer: COMMERCIAL

## (undated) DEVICE — CATHETER, DIAGNOSTIC, DXTERITY, JL 3.5, 100CM

## (undated) DEVICE — TR BAND, RADIAL COMPRESSION, STANDARD, 24CM

## (undated) DEVICE — CATHETER, DIAGNOSTIC, DXTERITY, 5FR PIG145, 110CM.6 SH

## (undated) DEVICE — CATHETER, DIAGNOSTIC, DXTERITY, 6FR 100CM, JL35

## (undated) DEVICE — GUIDEWIRE, INQWIRE, 3MM J, .035 X 210CM, FIXED

## (undated) DEVICE — CATHETER, DIAGNOSTIC, DXTERITY, 5 FR JR 4.0, 100CM

## (undated) DEVICE — SHEATH, GLIDESHEATH, SLENDER, 6FR 10CM

## (undated) DEVICE — CATHETER, GUIDING, HEARTRAIL III, 5 FR IKARI LT 3.5

## (undated) DEVICE — ACCESS KIT, S-MAK MINI, 4FR 10CM 0.018IN 40CM, NT/PT, ECHO ENHANCE NEEDLE

## (undated) DEVICE — SHEATH, PINNACLE, 10 CM,  5FR INTRODUCER, 5FR DIA, 2.5 CM DIALATOR

## (undated) DEVICE — CATHETER, GUIDING, LAUNCHER, 6FR EBU 3.0

## (undated) DEVICE — CATHETER, OPTITORQUE, 6FR 100CM, TIGER RADIAL 4.0

## (undated) DEVICE — CATHETER, DIAGNOSTIC, DXTERITY, 5 FR, JL 4.0, 100C